# Patient Record
Sex: FEMALE | Race: WHITE | Employment: OTHER | URBAN - METROPOLITAN AREA
[De-identification: names, ages, dates, MRNs, and addresses within clinical notes are randomized per-mention and may not be internally consistent; named-entity substitution may affect disease eponyms.]

---

## 2017-03-08 ENCOUNTER — DOCUMENTATION ONLY (OUTPATIENT)
Dept: HEMATOLOGY | Age: 57
End: 2017-03-08

## 2017-03-08 NOTE — PROGRESS NOTES
Patient is >1.5 years from end of successful treatment for HCV. Returned phone call to patient who was questioning need for additional follow-up in this office. Reviewed most recent labs from 11/2016 which continue to show normal liver enzymes. No need for ongoing follow-up in this office at this time as she has previously been shown to have achieved a sustained viral response to therapy. Patient advised to maintain follow-up with her PCP and we would be happy to see her in the future if indicated.

## 2017-06-08 ENCOUNTER — OFFICE VISIT (OUTPATIENT)
Dept: INTERNAL MEDICINE CLINIC | Age: 57
End: 2017-06-08

## 2017-06-08 VITALS
SYSTOLIC BLOOD PRESSURE: 140 MMHG | OXYGEN SATURATION: 97 % | WEIGHT: 191 LBS | HEIGHT: 66 IN | RESPIRATION RATE: 18 BRPM | DIASTOLIC BLOOD PRESSURE: 71 MMHG | HEART RATE: 67 BPM | BODY MASS INDEX: 30.7 KG/M2 | TEMPERATURE: 98.2 F

## 2017-06-08 DIAGNOSIS — Z86.19 HISTORY OF HEPATITIS C: ICD-10-CM

## 2017-06-08 DIAGNOSIS — M17.0 PRIMARY OSTEOARTHRITIS OF BOTH KNEES: Primary | ICD-10-CM

## 2017-06-08 DIAGNOSIS — K64.4 ANAL SKIN TAG: ICD-10-CM

## 2017-06-08 DIAGNOSIS — C50.412 BREAST CANCER OF UPPER-OUTER QUADRANT OF LEFT FEMALE BREAST (HCC): ICD-10-CM

## 2017-06-08 DIAGNOSIS — G89.29 CHRONIC BACK PAIN, UNSPECIFIED BACK LOCATION, UNSPECIFIED BACK PAIN LATERALITY: ICD-10-CM

## 2017-06-08 DIAGNOSIS — Z76.89 ENCOUNTER TO ESTABLISH CARE: ICD-10-CM

## 2017-06-08 DIAGNOSIS — G47.00 INSOMNIA, UNSPECIFIED TYPE: ICD-10-CM

## 2017-06-08 DIAGNOSIS — M54.9 CHRONIC BACK PAIN, UNSPECIFIED BACK LOCATION, UNSPECIFIED BACK PAIN LATERALITY: ICD-10-CM

## 2017-06-08 RX ORDER — RANITIDINE 150 MG/1
300 TABLET, FILM COATED ORAL 2 TIMES DAILY
COMMUNITY
Start: 2017-05-15 | End: 2020-07-16 | Stop reason: ALTCHOICE

## 2017-06-08 RX ORDER — LORAZEPAM 2 MG/1
2 TABLET ORAL
Status: CANCELLED | OUTPATIENT
Start: 2017-06-08

## 2017-06-08 RX ORDER — HYDROCODONE BITARTRATE AND ACETAMINOPHEN 7.5; 325 MG/1; MG/1
1 TABLET ORAL
Qty: 30 TAB | Refills: 0 | Status: CANCELLED | OUTPATIENT
Start: 2017-06-08

## 2017-06-08 RX ORDER — LEVOCETIRIZINE DIHYDROCHLORIDE 5 MG/1
5 TABLET, FILM COATED ORAL DAILY
Qty: 30 TAB | Refills: 11 | Status: SHIPPED | OUTPATIENT
Start: 2017-06-08

## 2017-06-08 RX ORDER — METHOCARBAMOL 500 MG/1
500 TABLET, FILM COATED ORAL
Qty: 60 TAB | Refills: 2 | Status: SHIPPED | OUTPATIENT
Start: 2017-06-08 | End: 2018-03-20

## 2017-06-08 RX ORDER — MELOXICAM 7.5 MG/1
7.5 TABLET ORAL DAILY
Qty: 30 TAB | Refills: 11 | Status: SHIPPED | OUTPATIENT
Start: 2017-06-08 | End: 2018-12-05 | Stop reason: SDUPTHER

## 2017-06-08 RX ORDER — CYCLOBENZAPRINE HCL 10 MG
TABLET ORAL
COMMUNITY
End: 2017-06-08 | Stop reason: ALTCHOICE

## 2017-06-08 RX ORDER — TRAZODONE HYDROCHLORIDE 50 MG/1
TABLET ORAL
Qty: 60 TAB | Refills: 11 | Status: SHIPPED | OUTPATIENT
Start: 2017-06-08 | End: 2017-09-18 | Stop reason: SINTOL

## 2017-06-08 RX ORDER — MELATONIN
DAILY
COMMUNITY
End: 2020-07-16 | Stop reason: DRUGHIGH

## 2017-06-08 RX ORDER — DICLOFENAC SODIUM 75 MG/1
75 TABLET, DELAYED RELEASE ORAL 2 TIMES DAILY
Qty: 60 TAB | Refills: 3 | Status: CANCELLED | OUTPATIENT
Start: 2017-06-08

## 2017-06-08 RX ORDER — DICLOFENAC SODIUM 10 MG/G
GEL TOPICAL
Refills: 2 | COMMUNITY
Start: 2017-05-04 | End: 2017-06-08 | Stop reason: SDUPTHER

## 2017-06-08 RX ORDER — DICLOFENAC SODIUM 10 MG/G
4 GEL TOPICAL EVERY 6 HOURS
Qty: 100 G | Refills: 11 | Status: SHIPPED | OUTPATIENT
Start: 2017-06-08 | End: 2018-10-03 | Stop reason: SDUPTHER

## 2017-06-08 RX ORDER — SULFAMETHOXAZOLE AND TRIMETHOPRIM 800; 160 MG/1; MG/1
TABLET ORAL
COMMUNITY
Start: 2017-05-14 | End: 2017-06-08 | Stop reason: ALTCHOICE

## 2017-06-08 NOTE — PROGRESS NOTES
Pt is here for   Chief Complaint   Patient presents with    New Patient     pt is here to establish care     Pt states pain level is a 7 back  Pt states last had something for pain this morning    1. Have you been to the ER, urgent care clinic since your last visit? Hospitalized since your last visit? No    2. Have you seen or consulted any other health care providers outside of the [facility] since your last visit? Include any pap smears or colon screening.  No

## 2017-06-08 NOTE — PATIENT INSTRUCTIONS
Developing a Pain Management Plan: Care Instructions  Your Care Instructions  Some diseases and injuries can cause pain that lasts a long time. You don't need to live with uncontrolled pain. A pain management plan helps you find ways to control pain with side effects you can live with. There are things you can do to help with pain. Only you know how much pain you feel. Constant pain can make you depressed. It can cause stress and make it hard for you to eat and sleep. But there are ways to control the pain. They can help you stay active, improve your mood, and heal faster. Your plan can include many types of pain control. You may take prescription or over-the-counter drugs. You can also try physical treatments and behavioral methods. Some medical treatments also help with pain. For example, radiation can be used to reduce pain from bone cancer. You and your doctor will work to make your plan. Be sure to read it often. Change it if your pain is not under control. Follow-up care is a key part of your treatment and safety. Be sure to make and go to all appointments, and call your doctor if you are having problems. It's also a good idea to know your test results and keep a list of the medicines you take. How can you care for yourself at home? Physical treatments  · Be safe with medicines. Take pain medicines exactly as directed. ¨ If the doctor gave you a prescription medicine for pain, take it exactly as prescribed. ¨ If you are not taking a prescription pain medicine, ask your doctor if you can take an over-the-counter medicine. · If your pain medicine causes side effects such as constipation or nausea, you may need to take other medicines for those problems. Talk to your doctor about any side effects you have. · Hot or cold compresses applied directly to the skin can help sore muscles. Put ice or a cold pack on the painful area for 10 to 20 minutes at a time.  Put a thin cloth between the ice and your skin. You may find that it helps to switch between cold and heat. Try a heating pad set on low or a warm cloth on the area for 10 to 15 minutes at a time. · Hydrotherapy uses flowing water to relax muscles. You may want to sit in a hot tub or a steam bath. Or use a shower or a sitz bath to help your pain. · Massage therapy is rubbing the soft tissues of the body. It eases tension and pain. It also improves blood flow and helps you relax. · Transcutaneous electrical nerve stimulation (TENS) uses a gentle electric current applied to the skin for pain relief. You can use TENS at home after you learn how. · Acupuncture is a form of traditional [facility] medicine. It uses very thin needles inserted into certain points of the body. It is done by a person with special training (acupuncturist). · If you get physical therapy, make sure to do any home exercises or stretching your therapist has prescribed. · Stay as active as you can. Try to get some physical activity every day. Behavioral treatments  · Biofeedback teaches you to control a body function that you normally don't think about. These are things like skin temperature, heart rate, and blood pressure. At first, you will use a machine to give you feedback on the function you want to control. Then a therapist will teach you what to do next. Over time, you can stop using the machine. · Breathing techniques can help you relax and get rid of tension. · Guided imagery is a series of thoughts and images that can focus your attention away from your pain. When you do it, you use all your senses to help your body respond as though what you are imagining is real.  · Hypnosis is a state of focused concentration that makes you less aware of your surroundings. It may cause your brain to release chemicals that relieve pain. You can have a therapist help you through hypnosis. Or you can learn to use it on yourself. · Cognitive behavioral therapy is a type of counseling.  It helps you change your thought patterns. Changes in your thoughts can help change your behavior and the way you perceive your body. Other treatments and ideas  · Aromatherapy uses the scent of oils obtained from plants to help you relax or to relieve stress. · Meditation focuses your attention to give a clear awareness of your life. You sit quietly, focus on one image or sound, and breathe deeply. · Yoga uses stretching and exercises (called postures) to reduce stress and improve flexibility and health. · Keep track of your pain in a pain diary. This can help you understand how the things you do affect your pain. · In rare cases, your doctor may advise you to get a nerve block to help with pain. A nerve block is a shot of medicine to interrupt pain signals to your brain. · Some hospitals have special pain clinics or centers. Your doctor may refer you to a pain clinic or center. Or you can ask your doctor about them. Where can you learn more? Go to http://amos-chong.info/. Enter R465 in the search box to learn more about \"Developing a Pain Management Plan: Care Instructions. \"  Current as of: October 14, 2016  Content Version: 11.2  © 8960-8290 [employer]. Care instructions adapted under license by [employer] (which disclaims liability or warranty for this information). If you have questions about a medical condition or this instruction, always ask your healthcare professional. [address] any warranty or liability for your use of this information. WEIGHT LOSS: Daily in the morning one half hour before breakfast on an empty stomach, and at night before sleeping, drink honey and cinnamon powder boiled in one cup of water to avoid fat to accumulate in the body. How To make cinnamon and honey recipe: All you need is cinnamon, honey and water! You need 2 tablespoons of honey and 1 tablespoon of cinnamon for a cup of water.  Its so simple! Bring everything to boil and simmer until it becomes smooth. Let it cool down or drink it warm. Enjoy!       -OR-    Drink these smoothies as meal replacements for 2-3 meals over the next 10 days, with healthy snacking between meals. Try to avoid eating less than 3 hours before bedtime and get moving, start by walking if you are not currently active aiming for 10,000 steps a day (use an saira or pedometer to track) or 150 min. Per week of moderate activity if you are already active. Healthy snacks:  Fruit- 1/2 cup per serving  Vegetables-1 cup per serving  Sugar-Free or Low-Carb branded snacks  Lean protein- chicken, turkey, fish, deer, organic-fat free beef (in moderation)  Jerky-beef, chicken, or turkey  Oatmeal    Drink mostly water, aiming for 1 gallon a day, but at least 10 glasses/day. May add lemon, lime, cucumber, or citrus fruit to water to help with digestion. For each smoothie, you mix the GREENS and WATER First, then add the fruit. Flaxseeds are last and use a high-powered , preferably similar to [employer] or [facility] for adequate mixing of ingredients.     Day 1: Glenys Tonya  3 handfuls of spinach  2 c water  1 apple (cut and cored)  1 c frozen mangos  1 c frozen strawberries  1 handful frozen/fresh seedless grapes  1 stevia packet  2 TBSP flaxseeds    Day 2: Apple Strawberry  3 handfuls spring mix greens  2 c water  1 banana, peeled  2 apples (cut and cored)  1.5 c frozen strawberries  2 stevia packets  2 TBSP flaxseeds    Day 3: Apple Ramirez  1 handful spring mix, 2 handfuls spinach  2 c water  1.5 frozen blueberries  1 banana, peeled  1 apple (cut and cored)  1 packet stevia  2 TBSP flaxseeds    Day 4: Ramirez Peachy  2 handfuls Kale, 1 handful spinach  2 c water  2 apples (cut and cored)  1.5 c frozen peaches  1.5 c frozen mixed berries  2 stevia packets  2 TBSP flaxseeds    Day 5: Peach Ramirez Spinach  3 handfuls spinach  2 c water  1 c frozen peaches  1 handful seedless grapes  1.5 c blueberries  3 stevia packets  2 TBSP flaxseeds    Day 6: Pineapple Spinach  2 c spinach  2 cups water  1 c pineapple chunks  2 c frozen peaches  2 bananas, peeled  1.5 stevia packets  2 TBSP Flaxseeds    Day 7: Pineapple Berry  2 handfuls spring mix, 2 handfuls spinach  2 c water  1 banana, peeled  1.5 c pineapple chunks  1.5 c frozen seth  1 c frozen mixed berries  3 stevia packets  2 TBSP Flaxseeds    Day 8: Spinach kale Berry  2 handfuls kale, 2 handfuls spinach  2 c water  1 apple (cut and cored)  1 banana, peeled  1.5 c frozen blueberries  2 stevia packets  2 TBSP Flaxseeds    Day 9: Apple Seth  3 handfuls spinach  2 c water  1 apple (cut and cored)  1.5 c frozen seth  2 c frozen strawberries  1 packet stevia  2 TBSP Flaxseed    Day 10: Pineapple Kale  2 handfuls kale, 1 handful spring mix  2 c water  1.5 c frozen peaches  2 handfuls pineapple chunks  2 stevia packets  2 TBSP Flaxseeds

## 2017-06-08 NOTE — PROGRESS NOTES
Arsen Beltran is a 61 y.o. female and presents with New Patient (pt is here to establish care)    Subjective:  Pt here to establish care. Has breast cancer, followed by oncology. Has appt coming up in 2 weeks. Took chemo in past, but had side effects, so stopped. Also with chronic knee pain, get injections at [facility] with Dr. Ricki Henley. Last done in October, using diclofenac gel with great relief. Having rectal bleeding due to anal skin tag. Had hemorrhoidectomy years ago and treating similarly, but will have surgery on Thursday. Continues to have spotting at this time. No rectal pain. Last colonoscopy in 2015, normal.     Insomnia Review:  Pt presents for insomnia, reporting difficulty falling and staying asleep. Started months ago, unchanged. Aggravated by nothing. Alleviated by Rx meds: ativan. Tried melatonin in past without relief. Related symptoms include fatigue, difficulty concentrating, daytime drowsiness. Denies h/o PTSD, substance abuse, and alcoholism.     Review of Systems  Constitutional: negative for fevers, chills, anorexia and weight loss  Eyes:   negative for visual disturbance, drainage, and irritation  ENT:   negative for tinnitus,sore throat,nasal congestion,ear pain,and hoarseness  Respiratory:  negative for cough, hemoptysis, dyspnea, and wheezing  CV:   negative for chest pain, palpitations, and lower extremity edema  GI:   negative for nausea, vomiting, diarrhea, abdominal pain, and melena  Endo:               negative for polyuria,polydipsia,polyphagia, and heat intolerance  Genitourinary: negative for frequency, urgency, dysuria, retention, and hematuria  Integument:  negative for rash, ulcerations, and pruritus  Hematologic:  negative for easy bruising and bleeding  Musculoskel: negative for arthralgias, muscle weakness,and joint pain/swelling  Neurological:  negative for headaches, dizziness, vertigo,and memory/gait problems  Behavl/Psych: negative for feelings of anxiety, depression, suicide, and mood changes    Past Medical History:   Diagnosis Date    Arthritis     Cancer ([facility] [address]     skin - leg    Chronic pain     GERD (gastroesophageal reflux disease)     Liver disease     hep-c     Past Surgical History:   Procedure Laterality Date    BIOPSY LIVER      BREAST SURGERY PROCEDURE UNLISTED  lymph node removed 4/8/15    HX APPENDECTOMY  12/30/2014    Laparoscopic Appendectomy    HX BLADDER SUSPENSION      HX BREAST LUMPECTOMY Left 4/8/15    with radiation     HX CYST INCISION AND DRAINAGE Left 4/8/2015    INCISION AND DRAINAGE BREAST performed by Madelon Schirmer[facility] at [facility] MAIN OR    HX GYN      hysterectomy    HX ORTHOPAEDIC      shoulder and heel spurs    HX OTHER SURGICAL      Teeth implants     Social History     Social History    Marital status:      Spouse name: N/A    Number of children: N/A    Years of education: N/A     Social History Main Topics    Smoking status: Former Smoker     Packs/day: 1.00     Years: 40.00     Types: Cigarettes     Quit date: 4/15/2015    Smokeless tobacco: Never Used      Comment: using vapor e-cigaretts    Alcohol use Yes      Comment: occasional    Drug use: No    Sexual activity: Not Asked     Other Topics Concern    None     Social History Narrative     Family History   Problem Relation Age of Onset    Diabetes Mother     Heart Disease Father     Hypertension Father     Diabetes Father      Current Outpatient Prescriptions   Medication Sig Dispense Refill    raNITIdine (ZANTAC) 150 mg tablet       MV,CALCIUM,MIN/IRON/FOLIC/VITK (MULTI FOR HER PO) Take  by mouth.  cholecalciferol (VITAMIN D3) 1,000 unit tablet Take  by mouth daily.  diclofenac (VOLTAREN) 1 % gel Apply 4 g to affected area every six (6) hours. 100 g 11    levocetirizine (XYZAL) 5 mg tablet Take 1 Tab by mouth daily. 30 Tab 11    methocarbamol (ROBAXIN) 500 mg tablet Take 1 Tab by mouth four (4) times daily as needed for Pain (m. spasms).  Do NOT Drive, may cause drowsiness 60 Tab 2    meloxicam (MOBIC) 7.5 mg tablet Take 1 Tab by mouth daily. 30 Tab 11    traZODone (DESYREL) 50 mg tablet Take 1-2 tabs every night for sleep. 60 Tab 11    ondansetron (ZOFRAN ODT) 4 mg disintegrating tablet Take 1 Tab by mouth every eight (8) hours as needed for Nausea. 10 Tab 0    ondansetron (ZOFRAN ODT) 4 mg disintegrating tablet Take 1 Tab by mouth every eight (8) hours as needed for Nausea. 20 Tab 0    magnesium oxide (MAG-OX) 400 mg tablet Take 400 mg by mouth daily.  docusate sodium (COLACE) 100 mg capsule Take 1 Cap by mouth two (2) times a day. May use over-the counter equivalent instead. Take twice daily while on narcotic pain reliever to prevent constipation. 60 Cap 0    GLUCOSAMINE HCL/CHONDR PORTILLO A NA (OSTEO BI-FLEX PO) Take  by mouth.  HYDROcodone-acetaminophen (NORCO) 7.5-325 mg per tablet Take 1 tablet by mouth every six (6) hours as needed. 30 tablet 0    LORazepam (ATIVAN) 2 mg tablet Take 2 mg by mouth nightly.  fluticasone 100 mcg/actuation DsDv Take 50 mcg by inhalation.  fiber cap Take 2 Caps by mouth daily.  Omega-3-DHA-EPA-Fish Oil 1,000 (120-180) mg cap Take 1 Cap by mouth daily. Allergies   Allergen Reactions    Pcn [Penicillins] Anaphylaxis       Objective:  Visit Vitals    /71 (BP 1 Location: Left arm, BP Patient Position: Sitting)    Pulse 67    Temp 98.2 °F (36.8 °C) (Oral)    Resp 18    Ht 5' 6\" (1.676 m)    Wt 191 lb (86.6 kg)    SpO2 97%    BMI 30.83 kg/m2     Wt Readings from Last 3 Encounters:   06/08/17 191 lb (86.6 kg)   11/14/16 191 lb 2.2 oz (86.7 kg)   02/18/16 198 lb (89.8 kg)     Physical Exam:   General appearance - alert, well appearing, and in no distress. Mental status - A/O x 4, normal mood and affect. Neck -Supple ,normal CSP. FROM, non-tender. No significant adenopathy/thyromegaly. No JVD. Chest - CTA. Symmetric chest rise. No wheezing, rales or rhonchi.     Heart - Normal rate, regular rhythm. Normal S1, S2. No MGR or clicks. Abdomen - Soft,non-distended. Normoactive BS in all quadrants. NT, no mass or HSM. Ext- Radial, DP pulses, 2+ bilaterally. No pedal edema, clubbing, or cyanosis. Skin-Warm and dry. No hyperpigmentation, ulcerations, or suspicious lesions. Neuro - Normal speech, no focal findings or movement disorder. Normal strength, gait, and muscle tone. Results for orders placed or performed during the hospital encounter of 11/14/16   URINALYSIS W/ REFLEX CULTURE   Result Value Ref Range    Color YELLOW/STRAW      Appearance CLOUDY (A) CLEAR      Specific gravity 1.023 1.003 - 1.030      pH (UA) 6.0 5.0 - 8.0      Protein 30 (A) NEG mg/dL    Glucose NEGATIVE  NEG mg/dL    Ketone NEGATIVE  NEG mg/dL    Bilirubin NEGATIVE  NEG      Blood NEGATIVE  NEG      Urobilinogen 0.2 0.2 - 1.0 EU/dL    Nitrites NEGATIVE  NEG      Leukocyte Esterase NEGATIVE  NEG      WBC 0-4 0 - 4 /hpf    RBC 0-5 0 - 5 /hpf    Epithelial cells FEW FEW /lpf    Bacteria NEGATIVE  NEG /hpf    UA:UC IF INDICATED CULTURE NOT INDICATED BY UA RESULT CNI      Mucus 1+ (A) NEG /lpf   CBC WITH AUTOMATED DIFF   Result Value Ref Range    WBC 13.2 (H) 3.6 - 11.0 K/uL    RBC 4.97 3.80 - 5.20 M/uL    HGB 14.8 11.5 - 16.0 g/dL    HCT 45.3 35.0 - 47.0 %    MCV 91.1 80.0 - 99.0 FL    MCH 29.8 26.0 - 34.0 PG    MCHC 32.7 30.0 - 36.5 g/dL    RDW 13.6 11.5 - 14.5 %    PLATELET 589 185 - 859 K/uL    NEUTROPHILS 90 (H) 32 - 75 %    LYMPHOCYTES 6 (L) 12 - 49 %    MONOCYTES 4 (L) 5 - 13 %    EOSINOPHILS 0 0 - 7 %    BASOPHILS 0 0 - 1 %    ABS. NEUTROPHILS 11.9 (H) 1.8 - 8.0 K/UL    ABS. LYMPHOCYTES 0.8 0.8 - 3.5 K/UL    ABS. MONOCYTES 0.5 0.0 - 1.0 K/UL    ABS. EOSINOPHILS 0.0 0.0 - 0.4 K/UL    ABS.  BASOPHILS 0.0 0.0 - 0.1 K/UL   METABOLIC PANEL, COMPREHENSIVE   Result Value Ref Range    Sodium 140 136 - 145 mmol/L    Potassium 3.8 3.5 - 5.1 mmol/L    Chloride 106 97 - 108 mmol/L    CO2 26 21 - 32 mmol/L    Anion gap 8 5 - 15 mmol/L    Glucose 107 (H) 65 - 100 mg/dL    BUN 23 (H) 6 - 20 MG/DL    Creatinine 0.93 0.55 - 1.02 MG/DL    BUN/Creatinine ratio 25 (H) 12 - 20      GFR est AA >60 >60 ml/min/1.73m2    GFR est non-AA >60 >60 ml/min/1.73m2    Calcium 9.4 8.5 - 10.1 MG/DL    Bilirubin, total 0.4 0.2 - 1.0 MG/DL    ALT (SGPT) 33 12 - 78 U/L    AST (SGOT) 25 15 - 37 U/L    Alk. phosphatase 74 45 - 117 U/L    Protein, total 8.5 (H) 6.4 - 8.2 g/dL    Albumin 4.0 3.5 - 5.0 g/dL    Globulin 4.5 (H) 2.0 - 4.0 g/dL    A-G Ratio 0.9 (L) 1.1 - 2.2     MAGNESIUM   Result Value Ref Range    Magnesium 2.1 1.6 - 2.4 mg/dL   LIPASE   Result Value Ref Range    Lipase 120 73 - 393 U/L     Assessment/Plan:  Trazodone started. Deferred pain mgt to pain clinic. meloxicam and robaxin added also. I spent greater than 50% of 30 minute visit counseling patient about impressions, prognosis, risk/benefits of treatment options, medication management and adequate follow-up, importance of adherence to treatment plan, and risk factor reduction. Medication Side Effects and Warnings were discussed with patient: yes   Patient Labs were reviewed: yes  Patient Past Records were reviewed: yes    See below for other orders   Follow-up Disposition:  Return in about 3 months (around 9/8/2017) for PCV, FOBT, 3 month f/u. Pt has given consent verbally while in office for [employer]. ICD-10-CM ICD-9-CM    1. Primary osteoarthritis of both knees M17.0 715.16 REFERRAL TO PAIN CLINIC   2. Anal skin tag K64.4 455.9    3. History of hepatitis C Z86.19 V12.09    4. Breast cancer of upper-outer quadrant of left female breast (HCC) C50.412 174.4    5. Chronic back pain, unspecified back location, unspecified back pain laterality M54.9 724.5 REFERRAL TO PAIN CLINIC    G89.29 338.29    6. Encounter to establish care Z76.89 V65.8    7.  Insomnia, unspecified type G47.00 780.52      Orders Placed This Encounter    REFERRAL TO PAIN CLINIC     Referral Priority: Routine     Referral Type:   Consultation     Referral Reason:   Specialty Services Required     Referral Location:   [address] [place] [address] Cricket: diclofenac (VOLTAREN) 1 % gel     Sig: APPLY TO AFFECTED AREA TWICE DAILY AS NEEDED     Refill:  2    raNITIdine (ZANTAC) 150 mg tablet    DISCONTD: trimethoprim-sulfamethoxazole (BACTRIM DS, SEPTRA DS) 160-800 mg per tablet    DISCONTD: cyclobenzaprine (FLEXERIL) 10 mg tablet     Sig: Take  by mouth three (3) times daily as needed for Muscle Spasm(s).  MV,CALCIUM,MIN/IRON/FOLIC/VITK (MULTI FOR HER PO)     Sig: Take  by mouth.  cholecalciferol (VITAMIN D3) 1,000 unit tablet     Sig: Take  by mouth daily.  diclofenac (VOLTAREN) 1 % gel     Sig: Apply 4 g to affected area every six (6) hours. Dispense:  100 g     Refill:  11    levocetirizine (XYZAL) 5 mg tablet     Sig: Take 1 Tab by mouth daily. Dispense:  30 Tab     Refill:  11    methocarbamol (ROBAXIN) 500 mg tablet     Sig: Take 1 Tab by mouth four (4) times daily as needed for Pain (m. spasms). Do NOT Drive, may cause drowsiness     Dispense:  60 Tab     Refill:  2    meloxicam (MOBIC) 7.5 mg tablet     Sig: Take 1 Tab by mouth daily. Dispense:  30 Tab     Refill:  11    traZODone (DESYREL) 50 mg tablet     Sig: Take 1-2 tabs every night for sleep. Dispense:  60 Tab     Refill:  11       Saray Fried expressed understanding of plan. An After Visit Summary was offered/printed and given to the patient.

## 2017-06-08 NOTE — MR AVS SNAPSHOT
Visit Information Date & Time Provider Department Dept. Phone Encounter #  
 6/8/2017 11:00 AM Margaret Davis NP 0969 [facility] [phone] [id number] Follow-up Instructions Return in about 3 months (around 9/8/2017) for PCV, FOBT, 3 month f/u. Upcoming Health Maintenance Date Due Pneumococcal 19-64 Highest Risk (1 of 3 - PCV13) 11/24/1978 DTaP/Tdap/Td series (1 - Tdap) 11/24/1980 FOBT Q 1 YEAR AGE 50-75 1/4/2011 PAP AKA CERVICAL CYTOLOGY 1/2/2016 INFLUENZA AGE 9 TO ADULT 8/1/2017 BREAST CANCER SCRN MAMMOGRAM 12/6/2018 Allergies as of 6/8/2017  Review Complete On: 6/8/2017 By: Margaret Davis NP Severity Noted Reaction Type Reactions Pcn [Penicillins] High 04/08/2013    Anaphylaxis Current Immunizations  Never Reviewed Name Date Influenza Vaccine 11/1/2014 Not reviewed this visit You Were Diagnosed With   
  
 Codes Comments Primary osteoarthritis of both knees    -  Primary ICD-10-CM: M17.0 ICD-9-CM: 715.16 Anal skin tag     ICD-10-CM: K64.4 ICD-9-CM: 455.9 History of hepatitis C     ICD-10-CM: Z86.19 ICD-9-CM: V12.09 Breast cancer of upper-outer quadrant of left female breast ([facility] [place] [facility]     ICD-10-CM: X79.438 ICD-9-CM: 174.4 Chronic back pain, unspecified back location, unspecified back pain laterality     ICD-10-CM: M54.9, G89.29 ICD-9-CM: 724.5, 338.29 Vitals BP Pulse Temp Resp Height(growth percentile) Weight(growth percentile) 140/71 (BP 1 Location: Left arm, BP Patient Position: Sitting) 67 98.2 °F (36.8 °C) (Oral) 18 5' 6\" (1.676 m) 191 lb (86.6 kg) SpO2 BMI OB Status Smoking Status 97% 30.83 kg/m2 Postmenopausal Former Smoker Vitals History BMI and BSA Data Body Mass Index Body Surface Area  
 30.83 kg/m 2 2.01 m 2 Preferred Pharmacy Pharmacy Name Phone [facility] [address] [phone] Your Updated Medication List  
  
   
This list is accurate as of: 6/8/17 12:00 PM.  Always use your most recent med list.  
  
  
  
  
 diclofenac 1 % Gel Commonly known as:  VOLTAREN Apply 4 g to affected area every six (6) hours. docusate sodium 100 mg capsule Commonly known as:  Young Bucker Take 1 Cap by mouth two (2) times a day. May use over-the counter equivalent instead. Take twice daily while on narcotic pain reliever to prevent constipation. fiber Cap Take 2 Caps by mouth daily. fluticasone 100 mcg/actuation Dsdv Take 50 mcg by inhalation. HYDROcodone-acetaminophen 7.5-325 mg per tablet Commonly known as:  Joyice Breeze Take 1 tablet by mouth every six (6) hours as needed. levocetirizine 5 mg tablet Commonly known as:  Callum Drones Take 1 Tab by mouth daily. LORazepam 2 mg tablet Commonly known as:  ATIVAN Take 2 mg by mouth nightly.  
  
 magnesium oxide 400 mg tablet Commonly known as:  MAG-OX Take 400 mg by mouth daily. meloxicam 7.5 mg tablet Commonly known as:  MOBIC Take 1 Tab by mouth daily. methocarbamol 500 mg tablet Commonly known as:  ROBAXIN Take 1 Tab by mouth four (4) times daily as needed for Pain (m. spasms). Do NOT Drive, may cause drowsiness MULTI FOR HER PO Take  by mouth. Omega-3-DHA-EPA-Fish Oil 1,000 mg (120 mg-180 mg) Cap Take 1 Cap by mouth daily. * ondansetron 4 mg disintegrating tablet Commonly known as:  ZOFRAN ODT Take 1 Tab by mouth every eight (8) hours as needed for Nausea. * ondansetron 4 mg disintegrating tablet Commonly known as:  ZOFRAN ODT Take 1 Tab by mouth every eight (8) hours as needed for Nausea. OSTEO BI-FLEX PO Take  by mouth. raNITIdine 150 mg tablet Commonly known as:  ZANTAC  
  
 VITAMIN D3 1,000 unit tablet Generic drug:  cholecalciferol Take  by mouth daily. * Notice:   This list has 2 medication(s) that are the same as other medications prescribed for you. Read the directions carefully, and ask your doctor or other care provider to review them with you. Prescriptions Sent to Pharmacy Refills  
 diclofenac (VOLTAREN) 1 % gel 11 Sig: Apply 4 g to affected area every six (6) hours. Class: Normal  
 Pharmacy: [address] [place] [address] Ph #: [phone] Route: Topical  
 levocetirizine (XYZAL) 5 mg tablet 11 Sig: Take 1 Tab by mouth daily. Class: Normal  
 Pharmacy: [address] [place] [address] Ph #: [phone] Route: Oral  
 methocarbamol (ROBAXIN) 500 mg tablet 2 Sig: Take 1 Tab by mouth four (4) times daily as needed for Pain (m. spasms). Do NOT Drive, may cause drowsiness Class: Normal  
 Pharmacy: [address] [place] [address] Ph #: [phone] Route: Oral  
 meloxicam (MOBIC) 7.5 mg tablet 11 Sig: Take 1 Tab by mouth daily. Class: Normal  
 Pharmacy: [address] [place] [address] Ph #: [phone] Route: Oral  
  
We Performed the Following REFERRAL TO PAIN CLINIC [ARC39 Custom] Comments:  
 Please evaluate patient for chronic knee and lower back pain, med management. Please CALL 
[phone] [address] [place] [address] 32 130 [facility] [address] [place] [address] Follow-up Instructions Return in about 3 months (around 9/8/2017) for PCV, FOBT, 3 month f/u. To-Do List   
 06/20/2017 9:00 AM  
  Appointment with [facility] ANGELA 1 at [address] [place] [address] ([phone]) Shower or bathe using soap and water.   Do not use deodorant, powder, perfumes, or lotion the day of your exam.  If your prior mammograms were not performed at [facility] please bring films with you or forward prior images 2 days before your procedure. If patient is not a callback diagnostic, the patient must have an order/script from the physician for the diagnostic. Please bring it on the day of the mammogram or have it faxed to the department. [facility]  [phone] [facility] [address] EVELIO  [phone] [phone] [facility] [address] [facility] [phone] Jm Alvarez [phone] Referral Information Referral ID Referred By Referred To  
  
 [id number] 1950 [facility], [address] [facility],[address] [facility], [place]Don Schmidt Final Phone: [phone] Fax: [phone] Visits Status Start Date End Date 1 New Request 6/8/17 6/8/18 If your referral has a status of pending review or denied, additional information will be sent to support the outcome of this decision. Patient Instructions Developing a Pain Management Plan: Care Instructions Your Care Instructions Some diseases and injuries can cause pain that lasts a long time. You don't need to live with uncontrolled pain. A pain management plan helps you find ways to control pain with side effects you can live with. There are things you can do to help with pain. Only you know how much pain you feel. Constant pain can make you depressed. It can cause stress and make it hard for you to eat and sleep. But there are ways to control the pain. They can help you stay active, improve your mood, and heal faster. Your plan can include many types of pain control. You may take prescription or over-the-counter drugs. You can also try physical treatments and behavioral methods. Some medical treatments also help with pain. For example, radiation can be used to reduce pain from bone cancer. You and your doctor will work to make your plan. Be sure to read it often. Change it if your pain is not under control. Follow-up care is a key part of your treatment and safety.  Be sure to make and go to all appointments, and call your doctor if you are having problems. It's also a good idea to know your test results and keep a list of the medicines you take. How can you care for yourself at home? Physical treatments · Be safe with medicines. Take pain medicines exactly as directed. ¨ If the doctor gave you a prescription medicine for pain, take it exactly as prescribed. ¨ If you are not taking a prescription pain medicine, ask your doctor if you can take an over-the-counter medicine. · If your pain medicine causes side effects such as constipation or nausea, you may need to take other medicines for those problems. Talk to your doctor about any side effects you have. · Hot or cold compresses applied directly to the skin can help sore muscles. Put ice or a cold pack on the painful area for 10 to 20 minutes at a time. Put a thin cloth between the ice and your skin. You may find that it helps to switch between cold and heat. Try a heating pad set on low or a warm cloth on the area for 10 to 15 minutes at a time. · Hydrotherapy uses flowing water to relax muscles. You may want to sit in a hot tub or a steam bath. Or use a shower or a sitz bath to help your pain. · Massage therapy is rubbing the soft tissues of the body. It eases tension and pain. It also improves blood flow and helps you relax. · Transcutaneous electrical nerve stimulation (TENS) uses a gentle electric current applied to the skin for pain relief. You can use TENS at home after you learn how. · Acupuncture is a form of traditional [facility] medicine. It uses very thin needles inserted into certain points of the body. It is done by a person with special training (acupuncturist). · If you get physical therapy, make sure to do any home exercises or stretching your therapist has prescribed. · Stay as active as you can. Try to get some physical activity every day. Behavioral treatments · Biofeedback teaches you to control a body function that you normally don't think about. These are things like skin temperature, heart rate, and blood pressure. At first, you will use a machine to give you feedback on the function you want to control. Then a therapist will teach you what to do next. Over time, you can stop using the machine. · Breathing techniques can help you relax and get rid of tension. · Guided imagery is a series of thoughts and images that can focus your attention away from your pain. When you do it, you use all your senses to help your body respond as though what you are imagining is real. 
· Hypnosis is a state of focused concentration that makes you less aware of your surroundings. It may cause your brain to release chemicals that relieve pain. You can have a therapist help you through hypnosis. Or you can learn to use it on yourself. · Cognitive behavioral therapy is a type of counseling. It helps you change your thought patterns. Changes in your thoughts can help change your behavior and the way you perceive your body. Other treatments and ideas · Aromatherapy uses the scent of oils obtained from plants to help you relax or to relieve stress. · Meditation focuses your attention to give a clear awareness of your life. You sit quietly, focus on one image or sound, and breathe deeply. · Yoga uses stretching and exercises (called postures) to reduce stress and improve flexibility and health. · Keep track of your pain in a pain diary. This can help you understand how the things you do affect your pain. · In rare cases, your doctor may advise you to get a nerve block to help with pain. A nerve block is a shot of medicine to interrupt pain signals to your brain. · Some hospitals have special pain clinics or centers. Your doctor may refer you to a pain clinic or center. Or you can ask your doctor about them. Where can you learn more? Go to http://amos-chong.info/. Enter J251 in the search box to learn more about \"Developing a Pain Management Plan: Care Instructions. \" Current as of: October 14, 2016 Content Version: 11.2 © 2307-4841 [employer]. Care instructions adapted under license by [employer] (which disclaims liability or warranty for this information). If you have questions about a medical condition or this instruction, always ask your healthcare professional. [address] any warranty or liability for your use of this information. WEIGHT LOSS: Daily in the morning one half hour before breakfast on an empty stomach, and at night before sleeping, drink honey and cinnamon powder boiled in one cup of water to avoid fat to accumulate in the body. How To make cinnamon and honey recipe: All you need is cinnamon, honey and water! You need 2 tablespoons of honey and 1 tablespoon of cinnamon for a cup of water. Its so simple! Bring everything to boil and simmer until it becomes smooth. Let it cool down or drink it warm. Enjoy!  
 
 
-OR- 
 
Drink these smoothies as meal replacements for 2-3 meals over the next 10 days, with healthy snacking between meals. Try to avoid eating less than 3 hours before bedtime and get moving, start by walking if you are not currently active aiming for 10,000 steps a day (use an saira or pedometer to track) or 150 min. Per week of moderate activity if you are already active. Healthy snacks: 
Fruit- 1/2 cup per serving Vegetables-1 cup per serving Sugar-Free or Low-Carb branded snacks Lean protein- chicken, turkey, fish, deer, organic-fat free beef (in moderation) Jerky-beef, chicken, or turkey Oatmeal 
 
Drink mostly water, aiming for 1 gallon a day, but at least 10 glasses/day. May add lemon, lime, cucumber, or citrus fruit to water to help with digestion. For each smoothie, you mix the GREENS and WATER First, then add the fruit. Flaxseeds are last and use a high-powered , preferably similar to Ninja or Nutribullet for adequate mixing of ingredients. Day 1: Wilhemina Basket 3 handfuls of spinach 2 c water 1 apple (cut and cored) 1 c frozen mangos 1 c frozen strawberries 1 handful frozen/fresh seedless grapes 1 stevia packet 2 TBSP flaxseeds Day 2: Apple [place] 3 handfuls spring mix greens 2 c water 
1 banana, peeled 2 apples (cut and cored) 1.5 c frozen strawberries 2 stevia packets 2 TBSP flaxseeds Day 3: Apple Paralee Gary 1 handful spring mix, 2 handfuls spinach 2 c water 1.5 frozen blueberries 1 banana, peeled 1 apple (cut and cored) 1 packet stevia 
2 TBSP flaxseeds Day 4: Ashok Picket [address] [place], 1 handful spinach 2 c water 2 apples (cut and cored) 1.5 c frozen peaches 1.5 c frozen mixed berries 2 stevia packets 2 TBSP flaxseeds Day 5: Peach Ramirez Spinach 
3 handfuls spinach 2 c water 1 c frozen peaches 
1 handful seedless grapes 1.5 c blueberries 3 stevia packets 2 TBSP flaxseeds Day 6: Pineapple Spinach 2 c spinach 2 cups water 1 c pineapple chunks 2 c frozen peaches 
2 bananas, peeled 1.5 stevia packets 2 TBSP Flaxseeds Day 7: Pineapple Paralee Gary 2 handfuls spring mix, 2 handfuls spinach 2 c water 
1 banana, peeled 1.5 c pineapple chunks 1.5 c frozen seth 1 c frozen mixed berries 3 stevia packets 2 TBSP Flaxseeds Day 8: Spinach kale Paralee Gary 2 handfuls kale, 2 handfuls spinach 2 c water 1 apple (cut and cored) 1 banana, peeled 1.5 c frozen blueberries 2 stevia packets 2 TBSP Flaxseeds Day 9: Apple Seth 3 handfuls spinach 2 c water 1 apple (cut and cored) 1.5 c frozen seth 2 c frozen strawberries 1 packet stevia 
2 TBSP Flaxseed Day 10: Pineapple Kale 
2 handfuls kale, 1 handful spring mix 2 c water 1.5 c frozen peaches 2 handfuls pineapple chunks 2 stevia packets 2 TBSP Flaxseeds Introducing [facility]! Dear Thomas Chacon: Thank you for requesting a [employer] account. Our records indicate that you already have an active [employer] account. You can access your account anytime at [email or web address] Did you know that you can access your hospital and ER discharge instructions at any time in [employer]? You can also review all of your test results from your hospital stay or ER visit. Additional Information If you have questions, please visit the Frequently Asked Questions section of the [employer] website at [email or web address] Remember, [employer] is NOT to be used for urgent needs. For medical emergencies, dial 911. Now available from your iPhone and Android! Please provide this summary of care documentation to your next provider. Your primary care clinician is listed as DEREK Asher. If you have any questions after today's visit, please call [phone].

## 2017-06-20 ENCOUNTER — HOSPITAL ENCOUNTER (OUTPATIENT)
Dept: MAMMOGRAPHY | Age: 57
Discharge: HOME | End: 2017-06-20
Attending: FAMILY MEDICINE
Payer: COMMERCIAL

## 2017-06-20 DIAGNOSIS — R92.8 ABNORMAL MAMMOGRAM: ICD-10-CM

## 2017-06-20 PROCEDURE — 77065 DX MAMMO INCL CAD UNI: CPT

## 2017-07-18 ENCOUNTER — HOSPITAL ENCOUNTER (OUTPATIENT)
Dept: MRI IMAGING | Age: 57
Discharge: HOME | End: 2017-07-18
Attending: PAIN MEDICINE
Payer: COMMERCIAL

## 2017-07-18 DIAGNOSIS — M48.061 STENOSIS, SPINAL, LUMBAR: ICD-10-CM

## 2017-07-18 PROCEDURE — A9576 INJ PROHANCE MULTIPACK: HCPCS | Performed by: PAIN MEDICINE

## 2017-07-18 PROCEDURE — 72158 MRI LUMBAR SPINE W/O & W/DYE: CPT

## 2017-07-18 PROCEDURE — 74011250636 HC RX REV CODE- 250/636: Performed by: PAIN MEDICINE

## 2017-07-18 RX ADMIN — GADOTERIDOL 17 ML: 279.3 INJECTION, SOLUTION INTRAVENOUS at 10:31

## 2017-08-15 ENCOUNTER — HOSPITAL ENCOUNTER (OUTPATIENT)
Dept: ULTRASOUND IMAGING | Age: 57
Discharge: HOME | End: 2017-08-15
Attending: PAIN MEDICINE
Payer: COMMERCIAL

## 2017-08-15 DIAGNOSIS — Q61.3 POLYCYSTIC KIDNEY, UNSPECIFIED TYPE: ICD-10-CM

## 2017-08-15 PROCEDURE — 76770 US EXAM ABDO BACK WALL COMP: CPT

## 2017-09-18 ENCOUNTER — OFFICE VISIT (OUTPATIENT)
Dept: INTERNAL MEDICINE CLINIC | Age: 57
End: 2017-09-18

## 2017-09-18 ENCOUNTER — HOSPITAL ENCOUNTER (OUTPATIENT)
Dept: LAB | Age: 57
Discharge: HOME | End: 2017-09-18

## 2017-09-18 VITALS
WEIGHT: 192.2 LBS | RESPIRATION RATE: 18 BRPM | TEMPERATURE: 98.2 F | SYSTOLIC BLOOD PRESSURE: 122 MMHG | BODY MASS INDEX: 30.89 KG/M2 | OXYGEN SATURATION: 94 % | HEIGHT: 66 IN | DIASTOLIC BLOOD PRESSURE: 72 MMHG | HEART RATE: 64 BPM

## 2017-09-18 DIAGNOSIS — G62.9 NEUROPATHY: Primary | ICD-10-CM

## 2017-09-18 DIAGNOSIS — Z13.21 SCREENING FOR ENDOCRINE, NUTRITIONAL, METABOLIC AND IMMUNITY DISORDER: ICD-10-CM

## 2017-09-18 DIAGNOSIS — Z12.11 COLON CANCER SCREENING: ICD-10-CM

## 2017-09-18 DIAGNOSIS — Z13.228 SCREENING FOR ENDOCRINE, NUTRITIONAL, METABOLIC AND IMMUNITY DISORDER: ICD-10-CM

## 2017-09-18 DIAGNOSIS — G47.00 INSOMNIA, UNSPECIFIED TYPE: ICD-10-CM

## 2017-09-18 DIAGNOSIS — Z13.0 SCREENING FOR ENDOCRINE, NUTRITIONAL, METABOLIC AND IMMUNITY DISORDER: ICD-10-CM

## 2017-09-18 DIAGNOSIS — Z13.220 SCREENING FOR LIPID DISORDERS: ICD-10-CM

## 2017-09-18 DIAGNOSIS — J30.89 NON-SEASONAL ALLERGIC RHINITIS, UNSPECIFIED ALLERGIC RHINITIS TRIGGER: ICD-10-CM

## 2017-09-18 DIAGNOSIS — G89.29 CHRONIC BACK PAIN, UNSPECIFIED BACK LOCATION, UNSPECIFIED BACK PAIN LATERALITY: ICD-10-CM

## 2017-09-18 DIAGNOSIS — M54.9 CHRONIC BACK PAIN, UNSPECIFIED BACK LOCATION, UNSPECIFIED BACK PAIN LATERALITY: ICD-10-CM

## 2017-09-18 DIAGNOSIS — Z13.29 SCREENING FOR ENDOCRINE, NUTRITIONAL, METABOLIC AND IMMUNITY DISORDER: ICD-10-CM

## 2017-09-18 PROBLEM — Z85.3 HISTORY OF BREAST CANCER: Status: ACTIVE | Noted: 2017-09-18

## 2017-09-18 PROCEDURE — 99001 SPECIMEN HANDLING PT-LAB: CPT | Performed by: NURSE PRACTITIONER

## 2017-09-18 RX ORDER — FLUTICASONE PROPIONATE 50 MCG
2 SPRAY, SUSPENSION (ML) NASAL DAILY
Qty: 1 BOTTLE | Refills: 11 | Status: SHIPPED | OUTPATIENT
Start: 2017-09-18 | End: 2018-09-17 | Stop reason: SDUPTHER

## 2017-09-18 NOTE — MR AVS SNAPSHOT
Visit Information Date & Time Provider Department Dept. Phone Encounter #  
 9/18/2017 10:20 AM Suzy Villagran NP [address] [facility] [phone] [id number] Upcoming Health Maintenance Date Due Pneumococcal 19-64 Highest Risk (1 of 3 - PCV13) 10/18/2017* FOBT Q 1 YEAR AGE 50-75 10/18/2017* PAP AKA CERVICAL CYTOLOGY 9/18/2018 BREAST CANCER SCRN MAMMOGRAM 6/20/2019 DTaP/Tdap/Td series (2 - Td) 1/1/2024 *Topic was postponed. The date shown is not the original due date. Allergies as of 9/18/2017  Review Complete On: 9/18/2017 By: Suzy Villagran NP Severity Noted Reaction Type Reactions Pcn [Penicillins] High 04/08/2013    Anaphylaxis Current Immunizations  Never Reviewed Name Date Influenza Vaccine 11/1/2014 Not reviewed this visit You Were Diagnosed With   
  
 Codes Comments Neuropathy ([facility] 75.)    -  Primary ICD-10-CM: G62.9 ICD-9-CM: 409. 9 Chronic back pain, unspecified back location, unspecified back pain laterality     ICD-10-CM: M54.9, G89.29 ICD-9-CM: 724.5, 338.29 Screening for endocrine, nutritional, metabolic and immunity disorder     ICD-10-CM: Z13.29, Z13.21, Z13.228, Z13.0 ICD-9-CM: V77.99 Screening for lipid disorders     ICD-10-CM: Z13.220 ICD-9-CM: V77.91 Colon cancer screening     ICD-10-CM: Z12.11 ICD-9-CM: V76.51 Insomnia, unspecified type     ICD-10-CM: G47.00 ICD-9-CM: 780.52 Vitals BP Pulse Temp Resp Height(growth percentile) Weight(growth percentile) 122/72 (BP 1 Location: Left arm, BP Patient Position: Sitting) 64 98.2 °F (36.8 °C) (Oral) 18 5' 6\" (1.676 m) 192 lb 3.2 oz (87.2 kg) SpO2 BMI OB Status Smoking Status 94% 31.02 kg/m2 Postmenopausal Former Smoker Vitals History BMI and BSA Data Body Mass Index Body Surface Area 31.02 kg/m 2 2.02 m 2 Preferred Pharmacy Pharmacy Name Phone [facility] [address] [place] [address] Cecilia Bustos, [facility] [address] [place] [address] [phone] Your Updated Medication List  
  
   
This list is accurate as of: 9/18/17 11:40 AM.  Always use your most recent med list.  
  
  
  
  
 diclofenac 1 % Gel Commonly known as:  VOLTAREN Apply 4 g to affected area every six (6) hours. docusate sodium 100 mg capsule Commonly known as:  Cheri Boss Take 1 Cap by mouth two (2) times a day. May use over-the counter equivalent instead. Take twice daily while on narcotic pain reliever to prevent constipation. fiber Cap Take 2 Caps by mouth daily. HYDROcodone-acetaminophen 7.5-325 mg per tablet Commonly known as:  Rhenda Howell Take 1 tablet by mouth every six (6) hours as needed. levocetirizine 5 mg tablet Commonly known as:  Dewanda Gather Take 1 Tab by mouth daily. LORazepam 2 mg tablet Commonly known as:  ATIVAN Take 2 mg by mouth nightly.  
  
 magnesium oxide 400 mg tablet Commonly known as:  MAG-OX Take 400 mg by mouth daily. meloxicam 7.5 mg tablet Commonly known as:  MOBIC Take 1 Tab by mouth daily. methocarbamol 500 mg tablet Commonly known as:  ROBAXIN Take 1 Tab by mouth four (4) times daily as needed for Pain (m. spasms). Do NOT Drive, may cause drowsiness MULTI FOR HER PO Take  by mouth. Omega-3-DHA-EPA-Fish Oil 1,000 mg (120 mg-180 mg) Cap Take 1 Cap by mouth daily. OSTEO BI-FLEX PO Take  by mouth. raNITIdine 150 mg tablet Commonly known as:  ZANTAC  
300 mg.  
  
 suvorexant 10 mg tablet Commonly known as:  Plasencia Na Take 1 Tab by mouth nightly as needed for Insomnia. Max Daily Amount: 10 mg. Indications: INSOMNIA  
  
 VITAMIN D3 1,000 unit tablet Generic drug:  cholecalciferol Take  by mouth daily. Prescriptions Printed Refills  
 suvorexant (BELSOMRA) 10 mg tablet 5 Sig: Take 1 Tab by mouth nightly as needed for Insomnia.  Max Daily Amount: 10 mg. Indications: INSOMNIA Class: Print Route: Oral  
  
We Performed the Following CBC WITH AUTOMATED DIFF [40769 CPT(R)] HEMOGLOBIN A1C WITH EAG [81156 CPT(R)] LIPID PANEL [19461 CPT(R)] METABOLIC PANEL, COMPREHENSIVE [61325 CPT(R)] OCCULT BLOOD, IMMUNOASSAY (FIT) [id number] CPT(R)] TSH 3RD GENERATION [04958 CPT(R)] To-Do List   
 12/13/2017 10:00 AM  
  Appointment with [facility] ANGELA 3 at [address] [place] [address] ([phone]) Shower or bathe using soap and water. Do not use deodorant, powder, perfumes, or lotion the day of your exam.  If your prior mammograms were not performed at [facility] 6 please bring films with you or forward prior images 2 days before your procedure. Check in at registration 15min before your appointment time unless you were instructed to do otherwise. A script is not necessary, but if you have one, please bring it on the day of the mammogram or have it faxed to the department. [facility] [phone] [facility] [place]  [phone] [facility] [address] [place]  [phone]  [phone] [address] [phone] Patient Instructions You would benefit from more adequate rest. Please be sure to remove yourself from stimulating activities, like using your cellphone, tablet, or watching T.V. About 2 hours before bedtime. Instead engage in relaxing activities, like reading, yoga, or listening to calming music. Also try to refrain from using these devices in your sleep space or bed, as this trains your brain to stay awake in the bed versus sleeping. Avoid caffeine containing products like coffee, tea, chocolate, and soda within 2 hours of bedtime and try over the counter meds as needed to help like ZZZquil, benadryl, UNISOM or melatonin (they are non-habit forming). Suvorexant (By mouth) Suvorexant (rgq-tea-MYU-ant) Treats insomnia. Brand Name(s): Belsomra There may be other brand names for this medicine. When This Medicine Should Not Be Used: This medicine is not right for everyone. Do not use it if you have narcolepsy. How to Use This Medicine:  
Tablet · Your doctor will tell you how much medicine to use. Do not use more than directed. · You should not take this medicine if you are not able to sleep or rest for at least 7 hours before you need to be active again. · This medicine works better if you do not take it with food or right after a meal. 
· This medicine should come with a Medication Guide. Ask your pharmacist for a copy if you do not have one. · Missed dose: This medicine is not taken on a regular schedule. Use it only when you cannot sleep. · Store the medicine in a closed container at room temperature, away from heat, moisture, and direct light. Do not remove the tablets from the blister pack until you are ready to use them. Drugs and Foods to Avoid: Ask your doctor or pharmacist before using any other medicine, including over-the-counter medicines, vitamins, and herbal products. · Some foods and medicines can affect how suvorexant works. Tell your doctor if you are using any of the following: 
¨ Aprepitant, boceprevir, carbamazepine, ciprofloxacin, clarithromycin, conivaptan, digoxin, diltiazem, erythromycin, fluconazole, imatinib, nefazodone, phenytoin, rifampin, telaprevir, telithromycin, verapamil ¨ Medicine to treat HIV (such as amprenavir, atazanavir, fosamprenavir, indinavir, nelfinavir, ritonavir, saquinavir) or medicine to treat a fungal infection (such as itraconazole, ketoconazole, posaconazole) · Tell your doctor if you use anything else that makes you sleepy. Some examples are allergy medicine, narcotic pain medicine, and alcohol. · Do not eat grapefruit or drink grapefruit juice while you are using this medicine. Warnings While Using This Medicine: · Tell your doctor if you are pregnant or breastfeeding, or if you have liver disease, breathing or lung problems (such as COPD, sleep apnea), or muscle problems or weakness. Tell your doctor if you have a history of alcohol or drug addiction, depression, or mental illness. · This medicine may cause the following problems: ¨ Unusual changes in mood or behavior ¨ Sleep paralysis (while you are going to sleep or waking up) · This medicine may make you drowsy the next morning. Do not drive or do anything else that could be dangerous until you know how this medicine affects you. · This medicine may cause you to do things while you are still asleep, such as driving or eating. You may not remember doing these things the next morning. Tell your doctor right away if you learn that this has happened. · Call your doctor if you still have trouble sleeping after you take this medicine for 7 to 10 days. · This medicine can be habit-forming. Do not use more than your prescribed dose. Call your doctor if you think your medicine is not working. · Keep all medicine out of the reach of children. Never share your medicine with anyone. Possible Side Effects While Using This Medicine:  
Call your doctor right away if you notice any of these side effects: · Allergic reaction: Itching or hives, swelling in your face or hands, swelling or tingling in your mouth or throat, chest tightness, trouble breathing · Anxiety, depression, nervousness, unusual behavior, or thoughts of hurting yourself · Memory loss · Seeing, hearing, or feeling things that are not there · Severe confusion, drowsiness, muscle weakness · Temporary inability to move or talk while you are going to sleep or waking up If you notice these less serious side effects, talk with your doctor: · Daytime drowsiness If you notice other side effects that you think are caused by this medicine, tell your doctor. Call your doctor for medical advice about side effects. You may report side effects to FDA at 9-769-WSF-5327 © 2017 [address] Information is for End User's use only and may not be sold, redistributed or otherwise used for commercial purposes. The above information is an  only. It is not intended as medical advice for individual conditions or treatments. Talk to your doctor, nurse or pharmacist before following any medical regimen to see if it is safe and effective for you. Learning About Sleeping Well What does sleeping well mean? Sleeping well means getting enough sleep. How much sleep is enough varies among people. The number of hours you sleep is not as important as how you feel when you wake up. If you do not feel refreshed, you probably need more sleep. Another sign of not getting enough sleep is feeling tired during the day. The average total nightly sleep time is 7½ to 8 hours. Healthy adults may need a little more or a little less than this. Why is getting enough sleep important? Getting enough quality sleep is a basic part of good health. When your sleep suffers, your mood and your thoughts can suffer too. You may find yourself feeling more grumpy or stressed. Not getting enough sleep also can lead to serious problems, including injury, accidents, anxiety, and depression. What might cause poor sleeping? Many things can cause sleep problems, including: · Stress. Stress can be caused by fear about a single event, such as giving a speech. Or you may have ongoing stress, such as worry about work or school. · Depression, anxiety, and other mental or emotional conditions. · Changes in your sleep habits or surroundings. This includes changes that happen where you sleep, such as noise, light, or sleeping in a different bed. It also includes changes in your sleep pattern, such as having jet lag or working a late shift. · Health problems, such as pain, breathing problems, and restless legs syndrome. · Lack of regular exercise. How can you help yourself? Here are some tips that may help you sleep more soundly and wake up feeling more refreshed. Your sleeping area · Use your bedroom only for sleeping and sex. A bit of light reading may help you fall asleep. But if it doesn't, do your reading elsewhere in the house. Don't watch TV in bed. · Be sure your bed is big enough to stretch out comfortably, especially if you have a sleep partner. · Keep your bedroom quiet, dark, and cool. Use curtains, blinds, or a sleep mask to block out light. To block out noise, use earplugs, soothing music, or a \"white noise\" machine. Your evening and bedtime routine · Create a relaxing bedtime routine. You might want to take a warm shower or bath, listen to soothing music, or drink a cup of noncaffeinated tea. · Go to bed at the same time every night. And get up at the same time every morning, even if you feel tired. What to avoid · Limit caffeine (coffee, tea, caffeinated sodas) during the day, and don't have any for at least 4 to 6 hours before bedtime. · Don't drink alcohol before bedtime. Alcohol can cause you to wake up more often during the night. · Don't smoke or use tobacco, especially in the evening. Nicotine can keep you awake. · Don't take naps during the day, especially close to bedtime. · Don't lie in bed awake for too long. If you can't fall asleep, or if you wake up in the middle of the night and can't get back to sleep within 15 minutes or so, get out of bed and go to another room until you feel sleepy. · Don't take medicine right before bed that may keep you awake or make you feel hyper or energized. Your doctor can tell you if your medicine may do this and if you can take it earlier in the day. If you can't sleep · Imagine yourself in a peaceful, pleasant scene. Focus on the details and feelings of being in a place that is relaxing. · Get up and do a quiet or boring activity until you feel sleepy. · Don't drink any liquids after 6 p.m. if you wake up often because you have to go to the bathroom. Where can you learn more? Go to http://amos-chong.info/. Enter V793 in the search box to learn more about \"Learning About Sleeping Well. \" Current as of: July 26, 2016 Content Version: 11.3 © 5300-7427 [employer]. Care instructions adapted under license by [employer] (which disclaims liability or warranty for this information). If you have questions about a medical condition or this instruction, always ask your healthcare professional. [place] [address] any warranty or liability for your use of this information. Colon Cancer Screening: Care Instructions Your Care Instructions Colorectal cancer occurs in the colon or rectum. That's the lower part of your digestive system. It is the second-leading cause of cancer deaths in the [place]. It often starts with small growths called polyps in the colon or rectum. Polyps are usually found with screening tests. Depending on the type of test, any polyps found may be removed during the tests. Colorectal cancer usually does not cause symptoms at first. But regular tests can help find it early, before it spreads and becomes harder to treat. Experts advise routine tests for colon cancer for people starting at age 48. And they advise people with a higher risk of colon cancer to get tested sooner. Talk with your doctor about when you should start testing. Discuss which tests you need. Follow-up care is a key part of your treatment and safety. Be sure to make and go to all appointments, and call your doctor if you are having problems. It's also a good idea to know your test results and keep a list of the medicines you take. What are the main screening tests for colon cancer? · Stool tests.  These include the fecal immunochemical test (FIT) and the fecal occult blood test (FOBT). These tests check stool samples for signs of cancer. If your test is positive, you will need to have a colonoscopy. · Sigmoidoscopy. This test lets your doctor look at the lining of your rectum and the lowest part of your colon. Your doctor uses a lighted tube called a sigmoidoscope. This test can't find cancers or polyps in the upper part of your colon. In some cases, polyps that are found can be removed. But if your doctor finds polyps, you will need to have a colonoscopy to check the upper part of your colon. · Colonoscopy. This test lets your doctor look at the lining of your rectum and your entire colon. The doctor uses a thin, flexible tool called a colonoscope. It can also be used to remove polyps or get a tissue sample (biopsy). What tests do you need? The following guidelines are for people age 48 and over who are not at high risk for colorectal cancer. You may have at least one of these tests as directed by your doctor. · Fecal immunochemical test (FIT) or fecal occult blood test (FOBT) every year · Sigmoidoscopy every 5 years · Colonoscopy every 10 years If you are age 68 to 80, you can work with your doctor to decide if screening is a good option. If you are age 80 or older, your doctor will likely advise that screening is not helpful. Talk with your doctor about when you need to be tested. And discuss which tests are right for you. Your doctor may recommend earlier or more frequent testing if you: 
· Have had colorectal cancer before. · Have had colon polyps. · Have symptoms of colorectal cancer. These include blood in your stool and changes in your bowel habits. · Have a parent, brother or sister, or child with colon polyps or colorectal cancer. · Have a bowel disease. This includes ulcerative colitis and Crohn's disease. · Have a rare polyp syndrome that runs in families, such as familial adenomatous polyposis (FAP). · Have had radiation treatments to the belly or pelvis. When should you call for help? Watch closely for changes in your health, and be sure to contact your doctor if: 
· You have any changes in your bowel habits. · You have any problems. Where can you learn more? Go to http://amos-chong.info/. Enter M541 in the search box to learn more about \"Colon Cancer Screening: Care Instructions. \" Current as of: May 3, 2017 Content Version: 11.3 © 2186-3757 [employer]. Care instructions adapted under license by [employer] (which disclaims liability or warranty for this information). If you have questions about a medical condition or this instruction, always ask your healthcare professional. [address] any warranty or liability for your use of this information. Introducing [facility]! Dear Dodie Knox: Thank you for requesting a [employer] account. Our records indicate that you already have an active [employer] account. You can access your account anytime at [email or web address] Did you know that you can access your hospital and ER discharge instructions at any time in [employer]? You can also review all of your test results from your hospital stay or ER visit. Additional Information If you have questions, please visit the Frequently Asked Questions section of the [employer] website at [email or web address] Remember, [employer] is NOT to be used for urgent needs. For medical emergencies, dial 911. Now available from your iPhone and Android! Please provide this summary of care documentation to your next provider. Your primary care clinician is listed as DEREK Skaggs. If you have any questions after today's visit, please call [phone].

## 2017-09-18 NOTE — PATIENT INSTRUCTIONS
You would benefit from more adequate rest. Please be sure to remove yourself from stimulating activities, like using your cellphone, tablet, or watching T.V. About 2 hours before bedtime. Instead engage in relaxing activities, like reading, yoga, or listening to calming music. Also try to refrain from using these devices in your sleep space or bed, as this trains your brain to stay awake in the bed versus sleeping. Avoid caffeine containing products like coffee, tea, chocolate, and soda within 2 hours of bedtime and try over the counter meds as needed to help like ZZZquil, benadryl, UNISOM or melatonin (they are non-habit forming). Suvorexant (By mouth)   Suvorexant ([id number]-ant)  Treats insomnia. Brand Name(s): Belsomra   There may be other brand names for this medicine. When This Medicine Should Not Be Used: This medicine is not right for everyone. Do not use it if you have narcolepsy. How to Use This Medicine:   Tablet  · Your doctor will tell you how much medicine to use. Do not use more than directed. · You should not take this medicine if you are not able to sleep or rest for at least 7 hours before you need to be active again. · This medicine works better if you do not take it with food or right after a meal.  · This medicine should come with a Medication Guide. Ask your pharmacist for a copy if you do not have one. · Missed dose: This medicine is not taken on a regular schedule. Use it only when you cannot sleep. · Store the medicine in a closed container at room temperature, away from heat, moisture, and direct light. Do not remove the tablets from the blister pack until you are ready to use them. Drugs and Foods to Avoid:   Ask your doctor or pharmacist before using any other medicine, including over-the-counter medicines, vitamins, and herbal products. · Some foods and medicines can affect how suvorexant works.  Tell your doctor if you are using any of the following:  ¨ Aprepitant, boceprevir, carbamazepine, ciprofloxacin, clarithromycin, conivaptan, digoxin, diltiazem, erythromycin, fluconazole, imatinib, nefazodone, phenytoin, rifampin, telaprevir, telithromycin, verapamil  ¨ Medicine to treat HIV (such as amprenavir, atazanavir, fosamprenavir, indinavir, nelfinavir, ritonavir, saquinavir) or medicine to treat a fungal infection (such as itraconazole, ketoconazole, posaconazole)  · Tell your doctor if you use anything else that makes you sleepy. Some examples are allergy medicine, narcotic pain medicine, and alcohol. · Do not eat grapefruit or drink grapefruit juice while you are using this medicine. Warnings While Using This Medicine:   · Tell your doctor if you are pregnant or breastfeeding, or if you have liver disease, breathing or lung problems (such as COPD, sleep apnea), or muscle problems or weakness. Tell your doctor if you have a history of alcohol or drug addiction, depression, or mental illness. · This medicine may cause the following problems:  ¨ Unusual changes in mood or behavior  ¨ Sleep paralysis (while you are going to sleep or waking up)  · This medicine may make you drowsy the next morning. Do not drive or do anything else that could be dangerous until you know how this medicine affects you. · This medicine may cause you to do things while you are still asleep, such as driving or eating. You may not remember doing these things the next morning. Tell your doctor right away if you learn that this has happened. · Call your doctor if you still have trouble sleeping after you take this medicine for 7 to 10 days. · This medicine can be habit-forming. Do not use more than your prescribed dose. Call your doctor if you think your medicine is not working. · Keep all medicine out of the reach of children. Never share your medicine with anyone.   Possible Side Effects While Using This Medicine:   Call your doctor right away if you notice any of these side effects:  · Allergic reaction: Itching or hives, swelling in your face or hands, swelling or tingling in your mouth or throat, chest tightness, trouble breathing  · Anxiety, depression, nervousness, unusual behavior, or thoughts of hurting yourself  · Memory loss  · Seeing, hearing, or feeling things that are not there  · Severe confusion, drowsiness, muscle weakness  · Temporary inability to move or talk while you are going to sleep or waking up  If you notice these less serious side effects, talk with your doctor:   · Daytime drowsiness  If you notice other side effects that you think are caused by this medicine, tell your doctor. Call your doctor for medical advice about side effects. You may report side effects to FDA at 4-024-SAV-8660  © 2017 [address] Information is for End User's use only and may not be sold, redistributed or otherwise used for commercial purposes. The above information is an  only. It is not intended as medical advice for individual conditions or treatments. Talk to your doctor, nurse or pharmacist before following any medical regimen to see if it is safe and effective for you. Learning About Sleeping Well  What does sleeping well mean? Sleeping well means getting enough sleep. How much sleep is enough varies among people. The number of hours you sleep is not as important as how you feel when you wake up. If you do not feel refreshed, you probably need more sleep. Another sign of not getting enough sleep is feeling tired during the day. The average total nightly sleep time is 7½ to 8 hours. Healthy adults may need a little more or a little less than this. Why is getting enough sleep important? Getting enough quality sleep is a basic part of good health. When your sleep suffers, your mood and your thoughts can suffer too. You may find yourself feeling more grumpy or stressed.  Not getting enough sleep also can lead to serious problems, including injury, accidents, anxiety, and depression. What might cause poor sleeping? Many things can cause sleep problems, including:  · Stress. Stress can be caused by fear about a single event, such as giving a speech. Or you may have ongoing stress, such as worry about work or school. · Depression, anxiety, and other mental or emotional conditions. · Changes in your sleep habits or surroundings. This includes changes that happen where you sleep, such as noise, light, or sleeping in a different bed. It also includes changes in your sleep pattern, such as having jet lag or working a late shift. · Health problems, such as pain, breathing problems, and restless legs syndrome. · Lack of regular exercise. How can you help yourself? Here are some tips that may help you sleep more soundly and wake up feeling more refreshed. Your sleeping area  · Use your bedroom only for sleeping and sex. A bit of light reading may help you fall asleep. But if it doesn't, do your reading elsewhere in the house. Don't watch TV in bed. · Be sure your bed is big enough to stretch out comfortably, especially if you have a sleep partner. · Keep your bedroom quiet, dark, and cool. Use curtains, blinds, or a sleep mask to block out light. To block out noise, use earplugs, soothing music, or a \"white noise\" machine. Your evening and bedtime routine  · Create a relaxing bedtime routine. You might want to take a warm shower or bath, listen to soothing music, or drink a cup of noncaffeinated tea. · Go to bed at the same time every night. And get up at the same time every morning, even if you feel tired. What to avoid  · Limit caffeine (coffee, tea, caffeinated sodas) during the day, and don't have any for at least 4 to 6 hours before bedtime. · Don't drink alcohol before bedtime. Alcohol can cause you to wake up more often during the night. · Don't smoke or use tobacco, especially in the evening. Nicotine can keep you awake.   · Don't take naps during the day, especially close to bedtime. · Don't lie in bed awake for too long. If you can't fall asleep, or if you wake up in the middle of the night and can't get back to sleep within 15 minutes or so, get out of bed and go to another room until you feel sleepy. · Don't take medicine right before bed that may keep you awake or make you feel hyper or energized. Your doctor can tell you if your medicine may do this and if you can take it earlier in the day. If you can't sleep  · Imagine yourself in a peaceful, pleasant scene. Focus on the details and feelings of being in a place that is relaxing. · Get up and do a quiet or boring activity until you feel sleepy. · Don't drink any liquids after 6 p.m. if you wake up often because you have to go to the bathroom. Where can you learn more? Go to http://amosSkillSlatechong.info/. Enter K873 in the search box to learn more about \"Learning About Sleeping Well. \"  Current as of: July 26, 2016  Content Version: 11.3  © 2539-5340 [employer]. Care instructions adapted under license by [employer] (which disclaims liability or warranty for this information). If you have questions about a medical condition or this instruction, always ask your healthcare professional. [address] any warranty or liability for your use of this information. Colon Cancer Screening: Care Instructions  Your Care Instructions    Colorectal cancer occurs in the colon or rectum. That's the lower part of your digestive system. It is the second-leading cause of cancer deaths in the [place]. It often starts with small growths called polyps in the colon or rectum. Polyps are usually found with screening tests. Depending on the type of test, any polyps found may be removed during the tests. Colorectal cancer usually does not cause symptoms at first. But regular tests can help find it early, before it spreads and becomes harder to treat. Experts advise routine tests for colon cancer for people starting at age 48. And they advise people with a higher risk of colon cancer to get tested sooner. Talk with your doctor about when you should start testing. Discuss which tests you need. Follow-up care is a key part of your treatment and safety. Be sure to make and go to all appointments, and call your doctor if you are having problems. It's also a good idea to know your test results and keep a list of the medicines you take. What are the main screening tests for colon cancer? · Stool tests. These include the fecal immunochemical test (FIT) and the fecal occult blood test (FOBT). These tests check stool samples for signs of cancer. If your test is positive, you will need to have a colonoscopy. · Sigmoidoscopy. This test lets your doctor look at the lining of your rectum and the lowest part of your colon. Your doctor uses a lighted tube called a sigmoidoscope. This test can't find cancers or polyps in the upper part of your colon. In some cases, polyps that are found can be removed. But if your doctor finds polyps, you will need to have a colonoscopy to check the upper part of your colon. · Colonoscopy. This test lets your doctor look at the lining of your rectum and your entire colon. The doctor uses a thin, flexible tool called a colonoscope. It can also be used to remove polyps or get a tissue sample (biopsy). What tests do you need? The following guidelines are for people age 48 and over who are not at high risk for colorectal cancer. You may have at least one of these tests as directed by your doctor. · Fecal immunochemical test (FIT) or fecal occult blood test (FOBT) every year  · Sigmoidoscopy every 5 years  · Colonoscopy every 10 years  If you are age 68 to 80, you can work with your doctor to decide if screening is a good option. If you are age 80 or older, your doctor will likely advise that screening is not helpful.   Talk with your doctor about when you need to be tested. And discuss which tests are right for you. Your doctor may recommend earlier or more frequent testing if you:  · Have had colorectal cancer before. · Have had colon polyps. · Have symptoms of colorectal cancer. These include blood in your stool and changes in your bowel habits. · Have a parent, brother or sister, or child with colon polyps or colorectal cancer. · Have a bowel disease. This includes ulcerative colitis and Crohn's disease. · Have a rare polyp syndrome that runs in families, such as familial adenomatous polyposis (FAP). · Have had radiation treatments to the belly or pelvis. When should you call for help? Watch closely for changes in your health, and be sure to contact your doctor if:  · You have any changes in your bowel habits. · You have any problems. Where can you learn more? Go to http://amos-chong.info/. Enter M541 in the search box to learn more about \"Colon Cancer Screening: Care Instructions. \"  Current as of: May 3, 2017  Content Version: 11.3  © 6757-0386 [employer]. Care instructions adapted under license by [employer] (which disclaims liability or warranty for this information). If you have questions about a medical condition or this instruction, always ask your healthcare professional. [address] any warranty or liability for your use of this information.

## 2017-09-18 NOTE — PROGRESS NOTES
Pt is here for   Chief Complaint   Patient presents with    Follow-up     3 month for PCV, FBOT    Medication Refill     orders pending. .. pt states that she would like a new allergy medication because the xyzal isn't covered by the insurance pt states she would like something for sleep    Labs     pt states she would like labs to check her thyroid levels, and her liver enzymes       Pt states pain level is a 5 lower back    1. Have you been to the ER, urgent care clinic since your last visit? Hospitalized since your last visit? No    2. Have you seen or consulted any other health care providers outside of the [address] [place] [address] since your last visit? Include any pap smears or colon screening.  No

## 2017-09-22 LAB
ALBUMIN SERPL-MCNC: 4.4 G/DL (ref 3.5–5.5)
ALBUMIN/GLOB SERPL: 1.7 {RATIO} (ref 1.2–2.2)
ALP SERPL-CCNC: 63 IU/L (ref 39–117)
ALT SERPL-CCNC: 19 IU/L (ref 0–32)
AST SERPL-CCNC: 21 IU/L (ref 0–40)
BASOPHILS # BLD AUTO: 0 X10E3/UL (ref 0–0.2)
BASOPHILS NFR BLD AUTO: 0 %
BILIRUB SERPL-MCNC: <0.2 MG/DL (ref 0–1.2)
BUN SERPL-MCNC: 21 MG/DL (ref 6–24)
BUN/CREAT SERPL: 30 (ref 9–23)
CALCIUM SERPL-MCNC: 9.6 MG/DL (ref 8.7–10.2)
CHLORIDE SERPL-SCNC: 99 MMOL/L (ref 96–106)
CHOLEST SERPL-MCNC: 188 MG/DL (ref 100–199)
CO2 SERPL-SCNC: 26 MMOL/L (ref 18–29)
CREAT SERPL-MCNC: 0.7 MG/DL (ref 0.57–1)
EOSINOPHIL # BLD AUTO: 0.2 X10E3/UL (ref 0–0.4)
EOSINOPHIL NFR BLD AUTO: 3 %
ERYTHROCYTE [DISTWIDTH] IN BLOOD BY AUTOMATED COUNT: 14.1 % (ref 12.3–15.4)
EST. AVERAGE GLUCOSE BLD GHB EST-MCNC: 105 MG/DL
GLOBULIN SER CALC-MCNC: 2.6 G/DL (ref 1.5–4.5)
GLUCOSE SERPL-MCNC: 91 MG/DL (ref 65–99)
HBA1C MFR BLD: 5.3 % (ref 4.8–5.6)
HCT VFR BLD AUTO: 39.2 % (ref 34–46.6)
HDLC SERPL-MCNC: 51 MG/DL
HEMOCCULT STL QL IA: NORMAL
HGB BLD-MCNC: 12.8 G/DL (ref 11.1–15.9)
IMM GRANULOCYTES # BLD: 0 X10E3/UL (ref 0–0.1)
IMM GRANULOCYTES NFR BLD: 0 %
INTERPRETATION, 910389: NORMAL
LDLC SERPL CALC-MCNC: 105 MG/DL (ref 0–99)
LYMPHOCYTES # BLD AUTO: 3.5 X10E3/UL (ref 0.7–3.1)
LYMPHOCYTES NFR BLD AUTO: 49 %
MCH RBC QN AUTO: 30 PG (ref 26.6–33)
MCHC RBC AUTO-ENTMCNC: 32.7 G/DL (ref 31.5–35.7)
MCV RBC AUTO: 92 FL (ref 79–97)
MONOCYTES # BLD AUTO: 0.7 X10E3/UL (ref 0.1–0.9)
MONOCYTES NFR BLD AUTO: 10 %
NEUTROPHILS # BLD AUTO: 2.7 X10E3/UL (ref 1.4–7)
NEUTROPHILS NFR BLD AUTO: 38 %
PLATELET # BLD AUTO: 249 X10E3/UL (ref 150–379)
POTASSIUM SERPL-SCNC: 4.5 MMOL/L (ref 3.5–5.2)
PROT SERPL-MCNC: 7 G/DL (ref 6–8.5)
RBC # BLD AUTO: 4.27 X10E6/UL (ref 3.77–5.28)
SODIUM SERPL-SCNC: 140 MMOL/L (ref 134–144)
TRIGL SERPL-MCNC: 158 MG/DL (ref 0–149)
TSH SERPL DL<=0.005 MIU/L-ACNC: 2.32 UIU/ML (ref 0.45–4.5)
VLDLC SERPL CALC-MCNC: 32 MG/DL (ref 5–40)
WBC # BLD AUTO: 7.2 X10E3/UL (ref 3.4–10.8)

## 2017-09-25 LAB
HEMOCCULT STL QL IA: NEGATIVE
PLEASE NOTE:, 188601: NORMAL

## 2017-10-16 ENCOUNTER — TELEPHONE (OUTPATIENT)
Dept: INTERNAL MEDICINE CLINIC | Age: 57
End: 2017-10-16

## 2017-10-16 RX ORDER — TRAZODONE HYDROCHLORIDE 50 MG/1
TABLET ORAL
Qty: 60 TAB | Refills: 11 | Status: SHIPPED | OUTPATIENT
Start: 2017-10-16 | End: 2019-04-29 | Stop reason: SDUPTHER

## 2017-10-16 NOTE — TELEPHONE ENCOUNTER
Pt requesting for an order for \"Trazodone\". Pt stated, she was unable to refill the medication at [facility] in pt's file [phone]. Pt stated, she needs the medication to help sleep after the lost of her fiance. Pt stated, she would like to know if she should come in to be seen.  Best contact number [phone].

## 2017-10-16 NOTE — TELEPHONE ENCOUNTER
No she doesn't need an office visit, she is just changing her report of trazodone's effectiveness. I will restart. If this is NOT helpful, she will need an office visit to discuss.

## 2017-10-16 NOTE — TELEPHONE ENCOUNTER
Pt states the Belsomra has a copay of $80. She states too that the trazadone not only helps her sleep but has a component for depression  And she lost her fiance 3 weeks ago and is depressed. Can you please give her the trazadone or does she need an office visit to get it.

## 2017-10-16 NOTE — TELEPHONE ENCOUNTER
I prescribed Belsomra, was she able to start this? It is to help with sleep since she reported a lack of improvement with trazodone use last OV.

## 2017-10-17 NOTE — TELEPHONE ENCOUNTER
Pt was called 10/17/17 @ 8:07 a m and informed rx was sent to the pharmacy and  If it does not work she will need an office visit.

## 2017-12-13 ENCOUNTER — HOSPITAL ENCOUNTER (OUTPATIENT)
Dept: MAMMOGRAPHY | Age: 57
Discharge: HOME | End: 2017-12-13
Attending: NURSE PRACTITIONER
Payer: COMMERCIAL

## 2017-12-13 DIAGNOSIS — Z85.3 HISTORY OF LEFT BREAST CANCER: ICD-10-CM

## 2017-12-13 DIAGNOSIS — Z12.31 VISIT FOR SCREENING MAMMOGRAM: ICD-10-CM

## 2017-12-13 PROCEDURE — 77066 DX MAMMO INCL CAD BI: CPT

## 2018-03-20 ENCOUNTER — OFFICE VISIT (OUTPATIENT)
Dept: INTERNAL MEDICINE CLINIC | Age: 58
End: 2018-03-20

## 2018-03-20 VITALS
RESPIRATION RATE: 18 BRPM | HEIGHT: 66 IN | HEART RATE: 62 BPM | OXYGEN SATURATION: 97 % | DIASTOLIC BLOOD PRESSURE: 82 MMHG | WEIGHT: 202 LBS | SYSTOLIC BLOOD PRESSURE: 125 MMHG | BODY MASS INDEX: 32.47 KG/M2 | TEMPERATURE: 98.2 F

## 2018-03-20 DIAGNOSIS — Z13.228 SCREENING FOR ENDOCRINE, NUTRITIONAL, METABOLIC AND IMMUNITY DISORDER: ICD-10-CM

## 2018-03-20 DIAGNOSIS — Z00.00 ADULT GENERAL MEDICAL EXAMINATION: Primary | ICD-10-CM

## 2018-03-20 DIAGNOSIS — Z87.891 FORMER SMOKER: ICD-10-CM

## 2018-03-20 DIAGNOSIS — Z13.29 SCREENING FOR ENDOCRINE, NUTRITIONAL, METABOLIC AND IMMUNITY DISORDER: ICD-10-CM

## 2018-03-20 DIAGNOSIS — Z85.3 HISTORY OF BREAST CANCER: ICD-10-CM

## 2018-03-20 DIAGNOSIS — Z13.21 SCREENING FOR ENDOCRINE, NUTRITIONAL, METABOLIC AND IMMUNITY DISORDER: ICD-10-CM

## 2018-03-20 DIAGNOSIS — Z13.0 SCREENING FOR ENDOCRINE, NUTRITIONAL, METABOLIC AND IMMUNITY DISORDER: ICD-10-CM

## 2018-03-20 NOTE — MR AVS SNAPSHOT
[employer]ora [employer] 
 
 
 [address] [facility] [address] [address] [phone] Patient: Marley Dickerson MRN: [id number] Visit Information Date & Time Provider Department Dept. Phone Encounter #  
 3/20/2018 10:20 AM Madi Pike NP [address] [facility] [phone] [id number] Follow-up Instructions Return in about 6 months (around 9/20/2018) for BMI. Upcoming Health Maintenance Date Due  
 PAP AKA CERVICAL CYTOLOGY 9/18/2018 FOBT Q 1 YEAR AGE 50-75 9/22/2018 Pneumococcal 19-64 Highest Risk (2 of 3 - PCV13) 3/20/2019 BREAST CANCER SCRN MAMMOGRAM 12/13/2019 DTaP/Tdap/Td series (2 - Td) 1/1/2024 Allergies as of 3/20/2018  Review Complete On: 3/20/2018 By: Rosanna Duran LPN Severity Noted Reaction Type Reactions Pcn [Penicillins] High 04/08/2013    Anaphylaxis Current Immunizations  Never Reviewed Name Date Influenza Vaccine 11/1/2014 Not reviewed this visit You Were Diagnosed With   
  
 Codes Comments Adult general medical examination    -  Primary ICD-10-CM: Z00.00 ICD-9-CM: V70.9 Screening for endocrine, nutritional, metabolic and immunity disorder     ICD-10-CM: Z13.29, Z13.21, Z13.228, Z13.0 ICD-9-CM: V77.99 History of breast cancer     ICD-10-CM: Z85.3 ICD-9-CM: V10.3 Former smoker     ICD-10-CM: C60.402 ICD-9-CM: V15.82 BMI 32.0-32.9,adult     ICD-10-CM: T82.72 
ICD-9-CM: V85.32 Vitals BP Pulse Temp Resp Height(growth percentile) Weight(growth percentile) 125/82 (BP 1 Location: Right arm, BP Patient Position: Sitting) 62 98.2 °F (36.8 °C) (Oral) 18 5' 6\" (1.676 m) 202 lb (91.6 kg) SpO2 BMI OB Status Smoking Status 97% 32.6 kg/m2 Postmenopausal Former Smoker Vitals History BMI and BSA Data Body Mass Index Body Surface Area  
 32.6 kg/m 2 2.07 m 2 Preferred Pharmacy Pharmacy Name Phone [facility] [address] [place] [address] [phone] Your Updated Medication List  
  
   
This list is accurate as of 3/20/18 11:15 AM.  Always use your most recent med list.  
  
  
  
  
 diclofenac 1 % Gel Commonly known as:  VOLTAREN Apply 4 g to affected area every six (6) hours. docusate sodium 100 mg capsule Commonly known as:  Alfonse Kras Take 1 Cap by mouth two (2) times a day. May use over-the counter equivalent instead. Take twice daily while on narcotic pain reliever to prevent constipation. fiber Cap Take 2 Caps by mouth daily. fluticasone 50 mcg/actuation nasal spray Commonly known as:  Zuly [place] 2 Sprays by Both Nostrils route daily. levocetirizine 5 mg tablet Commonly known as:  Stephanie Scripture Take 1 Tab by mouth daily. magnesium oxide 400 mg tablet Commonly known as:  MAG-OX Take 400 mg by mouth daily. meloxicam 7.5 mg tablet Commonly known as:  MOBIC Take 1 Tab by mouth daily. methocarbamol 500 mg tablet Commonly known as:  ROBAXIN Take 1 Tab by mouth four (4) times daily as needed for Pain (m. spasms). Do NOT Drive, may cause drowsiness  
  
 omega 3-DHA-EPA-fish oil 1,000 mg (120 mg-180 mg) capsule Take 1 Cap by mouth daily. OSTEO BI-FLEX PO Take  by mouth. raNITIdine 150 mg tablet Commonly known as:  ZANTAC  
300 mg two (2) times a day. traZODone 50 mg tablet Commonly known as:  Zackery Rangel Take 1-2 tabs every night for sleep. VITAMIN D3 1,000 unit tablet Generic drug:  cholecalciferol Take  by mouth daily. We Performed the Following CBC W/O DIFF [75917 CPT(R)] METABOLIC PANEL, COMPREHENSIVE [75715 CPT(R)] Follow-up Instructions Return in about 6 months (around 9/20/2018) for BMI. To-Do List   
 03/20/2018 Imaging:  CT LOW DOSE LUNG CANCER SCREENING Referral Information Referral ID Referred By Referred To [id number] Naveen Tripathi I Not Available Visits Status Start Date End Date 1 New Request 3/20/18 3/20/19 If your referral has a status of pending review or denied, additional information will be sent to support the outcome of this decision. Patient Instructions Well Visit, Women 48 to 72: Care Instructions Your Care Instructions Physical exams can help you stay healthy. Your doctor has checked your overall health and may have suggested ways to take good care of yourself. He or she also may have recommended tests. At home, you can help prevent illness with healthy eating, regular exercise, and other steps. Follow-up care is a key part of your treatment and safety. Be sure to make and go to all appointments, and call your doctor if you are having problems. It's also a good idea to know your test results and keep a list of the medicines you take. How can you care for yourself at home? · Reach and stay at a healthy weight. This will lower your risk for many problems, such as obesity, diabetes, heart disease, and high blood pressure. · Get at least 30 minutes of exercise on most days of the week. Walking is a good choice. You also may want to do other activities, such as running, swimming, cycling, or playing tennis or team sports. · Do not smoke. Smoking can make health problems worse. If you need help quitting, talk to your doctor about stop-smoking programs and medicines. These can increase your chances of quitting for good. · Protect your skin from too much sun. When you're outdoors from 10 a.m. to 4 p.m., stay in the shade or cover up with clothing and a hat with a wide brim. Wear sunglasses that block UV rays. Even when it's cloudy, put broad-spectrum sunscreen (SPF 30 or higher) on any exposed skin. · See a dentist one or two times a year for checkups and to have your teeth cleaned. · Wear a seat belt in the car. · Limit alcohol to 1 drink a day. Too much alcohol can cause health problems. Follow your doctor's advice about when to have certain tests. These tests can spot problems early. · Cholesterol. Your doctor will tell you how often to have this done based on your age, family history, or other things that can increase your risk for heart attack and stroke. · Blood pressure. Have your blood pressure checked during a routine doctor visit. Your doctor will tell you how often to check your blood pressure based on your age, your blood pressure results, and other factors. · Mammogram. Ask your doctor how often you should have a mammogram, which is an X-ray of your breasts. A mammogram can spot breast cancer before it can be felt and when it is easiest to treat. · Pap test and pelvic exam. Ask your doctor how often you should have a Pap test. You may not need to have a Pap test as often as you used to. · Vision. Have your eyes checked every year or two or as often as your doctor suggests. Some experts recommend that you have yearly exams for glaucoma and other age-related eye problems starting at age 48. · Hearing. Tell your doctor if you notice any change in your hearing. You can have tests to find out how well you hear. · Diabetes. Ask your doctor whether you should have tests for diabetes. · Colon cancer. You should begin tests for colon cancer at age 48. You may have one of several tests. Your doctor will tell you how often to have tests based on your age and risk. Risks include whether you already had a precancerous polyp removed from your colon or whether your parents, sisters and brothers, or children have had colon cancer. · Thyroid disease. Talk to your doctor about whether to have your thyroid checked as part of a regular physical exam. Women have an increased chance of a thyroid problem. · Osteoporosis. You should begin tests for bone density at age 72.  If you are younger than 72, ask your doctor whether you have factors that may increase your risk for this disease. You may want to have this test before age 72. · Heart attack and stroke risk. At least every 4 to 6 years, you should have your risk for heart attack and stroke assessed. Your doctor uses factors such as your age, blood pressure, cholesterol, and whether you smoke or have diabetes to show what your risk for a heart attack or stroke is over the next 10 years. When should you call for help? Watch closely for changes in your health, and be sure to contact your doctor if you have any problems or symptoms that concern you. Where can you learn more? Go to http://amos-chong.info/. Enter N253 in the search box to learn more about \"Well Visit, Women 50 to 72: Care Instructions. \" Current as of: May 12, 2017 Content Version: 11.4 © 5257-4871 [employer]. Care instructions adapted under license by [employer] (which disclaims liability or warranty for this information). If you have questions about a medical condition or this instruction, always ask your healthcare professional. [address] any warranty or liability for your use of this information. Learning About Lung Cancer Screening What is screening for lung cancer? Lung cancer screening is a way to find some lung cancers early, when a cure is more likely. Lung cancer screening may help those who have the highest risk for lung cancer-people 55 and older who are or were heavy smokers. For most people, who aren't at increased risk, screening for lung cancer probably isn't helpful. Screening won't prevent cancer. And it may not find all lung cancers. Lung cancer screening may lower the risk of dying from lung cancer in a small number of people. How is it done?  
Lung cancer screening is done with a low-dose CT (computed tomography) scan. A CT scan uses X-rays, or radiation, to make detailed pictures of your body. Experts recommend that screening be done in medical centers that focus on finding and treating lung cancer. Who is screening recommended for? Lung screening is only recommended for people who are or were heavy smokers. That means people with a smoking history of at least 30 pack years. A pack year is a way to measure how heavy a smoker you are or were. To figure out your pack years, multiply how many packs a day on average (assuming 20 cigarettes per pack) you have smoked by how many years you have smoked. For example: · If you smoked 1 pack a day for 15 years, that's 1 times 15. So you have a smoking history of 15 pack years. · If you smoked 2 packs a day for 15 years, that's 2 times 15. So you have a smoking history of 30 pack years. The [place]. Preventive Services Task Force recommends lung cancer screening if: 
· You are 54to [de-identified]years old. · And you have a smoking history of at least 30 pack years. · And you still smoke, or you quit within the last 15 years. · And you are not in poor health overall. (Having a serious health problem might mean that you couldn't have treatment for lung cancer. The treatment could be too high-risk, and it might not help you live longer.) What are the risks of screening? CT screening for lung cancer isn't perfect. It can show an abnormal result when it turns out there was not any cancer. This is called a false-positive result. This means you may need more tests to make sure you don't have cancer. These tests can be harmful and cause a lot of worry. These tests may include more CT scans and invasive testing like a lung biopsy. In a biopsy, the doctor takes a sample of tissue from inside your lung so it can be looked at under a microscope. A biopsy is the only way to tell if you have lung cancer. If the biopsy finds cancer, you and your doctor will have to decide how or whether to treat it. Some lung cancers found on CT scans are harmless and would not have caused a problem if they had not been found through screening. But because doctors can't tell which ones will turn out to be harmless, most will be treated. This means that you may get treatment-including surgery, radiation, or chemotherapy-that you don't need. There is a small chance of getting cancer from being exposed to radiation. A low-dose CT scan uses more radiation than a regular chest X-ray. But it uses much less than a regular-dose CT scan. You and your doctor will decide if the possibility of finding lung cancer early is worth the risk of having this test and being exposed to the radiation. What are the benefits of screening? Your scan may be normal (negative). For some people who are at higher risk, screening lowers the chance of dying of lung cancer. How much and how long you smoked helps to determine your risk level. Screening can find some cancers early, when treatment may be more likely to work. What happens after screening? The results of your CT scan will be sent to your doctor. Someone from your care team will explain the results of your scan and answer any questions you may have. If you need any follow-up, he or she will help you understand what to do next. After a lung cancer screening, you can go back to your usual activities right away. A lung cancer screening test can't tell if you have lung cancer. If your results are positive, your doctor can't tell whether an abnormal finding is a harmless nodule, cancer, or something else without doing more tests. What can you do to prevent lung cancer? Don't smoke. Most lung cancers are caused by smoking. If you have already quit smoking, you've taken the best step you can to prevent lung cancer. And if you still smoke, the best way to lower your chance of getting or dying from lung cancer is to quit. Your doctor may recommend medicines that can help you quit. Follow-up care is a key part of your treatment and safety. Be sure to make and go to all appointments, and call your doctor if you are having problems. It's also a good idea to know your test results and keep a list of the medicines you take. Where can you learn more? Go to http://amos-chong.info/. Enter [phone] in the search box to learn more about \"Learning About Lung Cancer Screening. \" Current as of: May 12, 2017 Content Version: 11.4 © 8751-2395 [employer]. Care instructions adapted under license by [employer] (which disclaims liability or warranty for this information). If you have questions about a medical condition or this instruction, always ask your healthcare professional. [address] any warranty or liability for your use of this information. Introducing [facility] [place] [facility]! Dear Yamileth Wang: Thank you for requesting a [employer] account. Our records indicate that you already have an active Medivo account. You can access your account anytime at [email or web address] Did you know that you can access your hospital and ER discharge instructions at any time in Medivo? You can also review all of your test results from your hospital stay or ER visit. Additional Information If you have questions, please visit the Frequently Asked Questions section of the Medivo website at [email or web address] Remember, Medivo is NOT to be used for urgent needs. For medical emergencies, dial 911. Now available from your iPhone and Android! Please provide this summary of care documentation to your next provider. Your primary care clinician is listed as DEREK Uribe. If you have any questions after today's visit, please call [phone].

## 2018-03-20 NOTE — PATIENT INSTRUCTIONS
Well Visit, Women 48 to 72: Care Instructions  Your Care Instructions    Physical exams can help you stay healthy. Your doctor has checked your overall health and may have suggested ways to take good care of yourself. He or she also may have recommended tests. At home, you can help prevent illness with healthy eating, regular exercise, and other steps. Follow-up care is a key part of your treatment and safety. Be sure to make and go to all appointments, and call your doctor if you are having problems. It's also a good idea to know your test results and keep a list of the medicines you take. How can you care for yourself at home? · Reach and stay at a healthy weight. This will lower your risk for many problems, such as obesity, diabetes, heart disease, and high blood pressure. · Get at least 30 minutes of exercise on most days of the week. Walking is a good choice. You also may want to do other activities, such as running, swimming, cycling, or playing tennis or team sports. · Do not smoke. Smoking can make health problems worse. If you need help quitting, talk to your doctor about stop-smoking programs and medicines. These can increase your chances of quitting for good. · Protect your skin from too much sun. When you're outdoors from 10 a.m. to 4 p.m., stay in the shade or cover up with clothing and a hat with a wide brim. Wear sunglasses that block UV rays. Even when it's cloudy, put broad-spectrum sunscreen (SPF 30 or higher) on any exposed skin. · See a dentist one or two times a year for checkups and to have your teeth cleaned. · Wear a seat belt in the car. · Limit alcohol to 1 drink a day. Too much alcohol can cause health problems. Follow your doctor's advice about when to have certain tests. These tests can spot problems early. · Cholesterol.  Your doctor will tell you how often to have this done based on your age, family history, or other things that can increase your risk for heart attack and stroke. · Blood pressure. Have your blood pressure checked during a routine doctor visit. Your doctor will tell you how often to check your blood pressure based on your age, your blood pressure results, and other factors. · Mammogram. Ask your doctor how often you should have a mammogram, which is an X-ray of your breasts. A mammogram can spot breast cancer before it can be felt and when it is easiest to treat. · Pap test and pelvic exam. Ask your doctor how often you should have a Pap test. You may not need to have a Pap test as often as you used to. · Vision. Have your eyes checked every year or two or as often as your doctor suggests. Some experts recommend that you have yearly exams for glaucoma and other age-related eye problems starting at age 48. · Hearing. Tell your doctor if you notice any change in your hearing. You can have tests to find out how well you hear. · Diabetes. Ask your doctor whether you should have tests for diabetes. · Colon cancer. You should begin tests for colon cancer at age 48. You may have one of several tests. Your doctor will tell you how often to have tests based on your age and risk. Risks include whether you already had a precancerous polyp removed from your colon or whether your parents, sisters and brothers, or children have had colon cancer. · Thyroid disease. Talk to your doctor about whether to have your thyroid checked as part of a regular physical exam. Women have an increased chance of a thyroid problem. · Osteoporosis. You should begin tests for bone density at age 72. If you are younger than 72, ask your doctor whether you have factors that may increase your risk for this disease. You may want to have this test before age 72. · Heart attack and stroke risk. At least every 4 to 6 years, you should have your risk for heart attack and stroke assessed.  Your doctor uses factors such as your age, blood pressure, cholesterol, and whether you smoke or have diabetes to show what your risk for a heart attack or stroke is over the next 10 years. When should you call for help? Watch closely for changes in your health, and be sure to contact your doctor if you have any problems or symptoms that concern you. Where can you learn more? Go to http://amos-chong.info/. Enter V011 in the search box to learn more about \"Well Visit, Women 50 to 72: Care Instructions. \"  Current as of: May 12, 2017  Content Version: 11.4  © 5522-3952 [employer]. Care instructions adapted under license by [employer] (which disclaims liability or warranty for this information). If you have questions about a medical condition or this instruction, always ask your healthcare professional. [address] any warranty or liability for your use of this information. Learning About Lung Cancer Screening  What is screening for lung cancer? Lung cancer screening is a way to find some lung cancers early, when a cure is more likely. Lung cancer screening may help those who have the highest risk for lung cancer-people 55 and older who are or were heavy smokers. For most people, who aren't at increased risk, screening for lung cancer probably isn't helpful. Screening won't prevent cancer. And it may not find all lung cancers. Lung cancer screening may lower the risk of dying from lung cancer in a small number of people. How is it done? Lung cancer screening is done with a low-dose CT (computed tomography) scan. A CT scan uses X-rays, or radiation, to make detailed pictures of your body. Experts recommend that screening be done in medical centers that focus on finding and treating lung cancer. Who is screening recommended for? Lung screening is only recommended for people who are or were heavy smokers. That means people with a smoking history of at least 30 pack years. A pack year is a way to measure how heavy a smoker you are or were.   To figure out your pack years, multiply how many packs a day on average (assuming 20 cigarettes per pack) you have smoked by how many years you have smoked. For example:  · If you smoked 1 pack a day for 15 years, that's 1 times 15. So you have a smoking history of 15 pack years. · If you smoked 2 packs a day for 15 years, that's 2 times 15. So you have a smoking history of 30 pack years. The [place]. Preventive Services Task Force recommends lung cancer screening if:  · You are 54to [de-identified]years old. · And you have a smoking history of at least 30 pack years. · And you still smoke, or you quit within the last 15 years. · And you are not in poor health overall. (Having a serious health problem might mean that you couldn't have treatment for lung cancer. The treatment could be too high-risk, and it might not help you live longer.)  What are the risks of screening? CT screening for lung cancer isn't perfect. It can show an abnormal result when it turns out there was not any cancer. This is called a false-positive result. This means you may need more tests to make sure you don't have cancer. These tests can be harmful and cause a lot of worry. These tests may include more CT scans and invasive testing like a lung biopsy. In a biopsy, the doctor takes a sample of tissue from inside your lung so it can be looked at under a microscope. A biopsy is the only way to tell if you have lung cancer. If the biopsy finds cancer, you and your doctor will have to decide how or whether to treat it. Some lung cancers found on CT scans are harmless and would not have caused a problem if they had not been found through screening. But because doctors can't tell which ones will turn out to be harmless, most will be treated. This means that you may get treatment-including surgery, radiation, or chemotherapy-that you don't need. There is a small chance of getting cancer from being exposed to radiation.  A low-dose CT scan uses more radiation than a regular chest X-ray. But it uses much less than a regular-dose CT scan. You and your doctor will decide if the possibility of finding lung cancer early is worth the risk of having this test and being exposed to the radiation. What are the benefits of screening? Your scan may be normal (negative). For some people who are at higher risk, screening lowers the chance of dying of lung cancer. How much and how long you smoked helps to determine your risk level. Screening can find some cancers early, when treatment may be more likely to work. What happens after screening? The results of your CT scan will be sent to your doctor. Someone from your care team will explain the results of your scan and answer any questions you may have. If you need any follow-up, he or she will help you understand what to do next. After a lung cancer screening, you can go back to your usual activities right away. A lung cancer screening test can't tell if you have lung cancer. If your results are positive, your doctor can't tell whether an abnormal finding is a harmless nodule, cancer, or something else without doing more tests. What can you do to prevent lung cancer? Don't smoke. Most lung cancers are caused by smoking. If you have already quit smoking, you've taken the best step you can to prevent lung cancer. And if you still smoke, the best way to lower your chance of getting or dying from lung cancer is to quit. Your doctor may recommend medicines that can help you quit. Follow-up care is a key part of your treatment and safety. Be sure to make and go to all appointments, and call your doctor if you are having problems. It's also a good idea to know your test results and keep a list of the medicines you take. Where can you learn more? Go to http://amos-chong.info/. Enter [phone] in the search box to learn more about \"Learning About Lung Cancer Screening. \"  Current as of:  May 12, 2017  Content Version: 11.4  © 1157-4137 [employer]. Care instructions adapted under license by [employer] (which disclaims liability or warranty for this information). If you have questions about a medical condition or this instruction, always ask your healthcare professional. [address] any warranty or liability for your use of this information.

## 2018-03-20 NOTE — PROGRESS NOTES
Vernon Ramirez is a 61 y.o. female and presents with Complete Physical (pt is here for a physical)    Subjective:  Vernon Ramirez is a 61 y.o. female presenting for annual checkup. ROS: Feeling well. No dyspnea or chest pain on exertion. No abdominal pain, change in bowel habits, black or bloody stools. No urinary tract or prostatic symptoms. No neurological complaints. Specific concerns today: had indigestion with endoscopy done, started on antifungal and probiotic. Also taking ranitidine now. Exercising more lately. Taking trazodone and melatonin to help with sleep, feels it is working great. Discussed ADVANCED DIRECTIVE:yes  Advanced Directive on File: no  Last BM: today, normal  Dental exam in past 12 months: yes  Eye exam in past 12 months: yes, glaucoma suspect      Review of Systems  Constitutional: negative for fevers, chills, anorexia and weight loss  Eyes:   +floaters. negative for visual disturbance, drainage, and irritation  ENT:   + NC. negative for tinnitus,sore throat,ear pain,and hoarseness  Respiratory:  negative for cough, hemoptysis, dyspnea, and wheezing  CV:   negative for chest pain, palpitations, and lower extremity edema  GI:   negative for nausea, vomiting, diarrhea, abdominal pain, and melena  Endo:               +polyuria. negative for polydipsia,polyphagia, and heat intolerance  Genitourinary: negative for frequency, urgency, dysuria, retention, and hematuria  Integument:  negative for rash, ulcerations, and pruritus  Hematologic:  negative for easy bruising and bleeding  Musculoskel: negative for arthralgias, muscle weakness,and joint pain/swelling  Neurological:  negative for headaches, dizziness, vertigo,and memory/gait problems  Behavl/Psych: +anx/depression.  negative for feelings of anxiety, depression, suicide, and mood changes    Past Medical History:   Diagnosis Date    Arthritis     Cancer ([facility] [address]     skin - leg    Chronic pain     GERD (gastroesophageal reflux disease)  Liver disease     hep-c    Radiation therapy complication 0032    left breast     Past Surgical History:   Procedure Laterality Date    BIOPSY LIVER      BREAST SURGERY PROCEDURE UNLISTED  lymph node removed 4/8/15    HX APPENDECTOMY  12/30/2014    Laparoscopic Appendectomy    HX BLADDER SUSPENSION      HX BREAST BIOPSY Left 2015    HX BREAST LUMPECTOMY Left 4/8/15    with radiation     HX CYST INCISION AND DRAINAGE Left 4/8/2015    INCISION AND DRAINAGE BREAST performed by Beata Khan MD at \[facility]"" MAIN OR    HX GYN      hysterectomy    HX ORTHOPAEDIC      shoulder and heel spurs    HX OTHER SURGICAL      Teeth implants     Social History     Social History    Marital status:      Spouse name: N/A    Number of children: N/A    Years of education: N/A     Social History Main Topics    Smoking status: Former Smoker     Packs/day: 1.00     Years: 40.00     Types: Cigarettes     Quit date: 4/15/2015    Smokeless tobacco: Never Used      Comment: using vapor e-cigaretts    Alcohol use Yes      Comment: occasional    Drug use: No    Sexual activity: Not Asked     Other Topics Concern    None     Social History Narrative     Family History   Problem Relation Age of Onset    Diabetes Mother     Heart Disease Father     Hypertension Father     Diabetes Father      Current Outpatient Prescriptions   Medication Sig Dispense Refill    traZODone (DESYREL) 50 mg tablet Take 1-2 tabs every night for sleep. 60 Tab 11    fluticasone (FLONASE) 50 mcg/actuation nasal spray 2 Sprays by Both Nostrils route daily. 1 Bottle 11    raNITIdine (ZANTAC) 150 mg tablet 300 mg two (2) times a day.  cholecalciferol (VITAMIN D3) 1,000 unit tablet Take  by mouth daily.  diclofenac (VOLTAREN) 1 % gel Apply 4 g to affected area every six (6) hours. 100 g 11    levocetirizine (XYZAL) 5 mg tablet Take 1 Tab by mouth daily. 30 Tab 11    meloxicam (MOBIC) 7.5 mg tablet Take 1 Tab by mouth daily.  27 Tab 11    magnesium oxide (MAG-OX) 400 mg tablet Take 400 mg by mouth daily.  docusate sodium (COLACE) 100 mg capsule Take 1 Cap by mouth two (2) times a day. May use over-the counter equivalent instead. Take twice daily while on narcotic pain reliever to prevent constipation. 60 Cap 0    GLUCOSAMINE HCL/CHONDR PORTILLO A NA (OSTEO BI-FLEX PO) Take  by mouth.  fiber cap Take 2 Caps by mouth daily.  Omega-3-DHA-EPA-Fish Oil 1,000 (120-180) mg cap Take 1 Cap by mouth daily. Allergies   Allergen Reactions    Pcn [Penicillins] Anaphylaxis       Objective:  Visit Vitals    /82 (BP 1 Location: Right arm, BP Patient Position: Sitting)    Pulse 62    Temp 98.2 °F (36.8 °C) (Oral)    Resp 18    Ht 5' 6\" (1.676 m)    Wt 202 lb (91.6 kg)    SpO2 97%    BMI 32.6 kg/m2     Wt Readings from Last 3 Encounters:   03/20/18 202 lb (91.6 kg)   09/18/17 192 lb 3.2 oz (87.2 kg)   06/08/17 191 lb (86.6 kg)     Physical Exam:   General appearance - alert, well appearing, and in no distress. Mental status - A/O x 4, normal mood and affect. Head/Eyes- AT/NC. PHILLY, EOMI, corneas normal, no foreign bodies. Ears- TM injected bilaterally with air fluid level on left, no erythema or drainage. Nose- Septum midline, pink mucosa. Turbinates right boggy and pink, no polyps or erythema. No sinus tenderness. Mouth/Throat - mucous membranes moist, pharynx normal without lesions. No tonsillar swelling or exudates. Neck -Supple ,normal CSP. FROM, non-tender. No adenopathy/thyromegaly. No JVD. Chest - CTA. Symmetric chest rise. No wheezing, rales or rhonchi. Heart - Normal rate, regular rhythm. Normal S1, S2. No MGR. Abdomen - Soft,non-distended. Normoactive BS in all quadrants. NT, no mass, rebound, or HSM. No CVAT. Ext- Radial, DP pulses, 2+ bilaterally. No pedal edema, clubbing, or cyanosis. Skin- Normal for ethnicity, warm, and dry.  No hyperpigmentation, ulcerations, or suspicious lesions  Neuro - Normal speech, no focal findings. Normal strength and muscle tone. Coordination and gait normal.    CN II-XII intact. Cold and vibratory sensation intact. BL patellar DTR's, 0+. Assessment/Plan:  Labs ordered. LDCT ordered after discussing need for yearly imaging and indications for screening with < 30 pack-years with patient. Understanding of need for imaging and agreement to testing verbalized. Continued smoking cessation encouraged. Discussed the patient's BMI with her. The BMI follow up plan is as follows:     I have reviewed/discussed the above normal BMI (Body mass index is 32.6 kg/(m^2). ) with the patient. I have recommended the following interventions: encourage exercise, monitor weight, and dietary management education, guidance, and counseling, . Medication Side Effects and Warnings were discussed with patient: yes   Patient Labs were reviewed: yes  Patient Past Records were reviewed: yes  See orders below      ICD-10-CM ICD-9-CM    1. Adult general medical examination Z00.00 V70.9 CT LOW DOSE LUNG CANCER SCREENING   2. Screening for endocrine, nutritional, metabolic and immunity disorder C08.75 N57.50 METABOLIC PANEL, COMPREHENSIVE    Z13.21  CBC W/O DIFF    Z13.228      Z13.0     3. History of breast cancer Z85.3 V10.3 CT LOW DOSE LUNG CANCER SCREENING   4. Former smoker Z87.891 V15.82 CT LOW DOSE LUNG CANCER SCREENING   5. BMI 32.0-32.9,adult Z68.32 V85.32      Orders Placed This Encounter    CT LOW DOSE LUNG CANCER SCREENING     Standing Status:   Future     Standing Expiration Date:   3/21/2019     Order Specific Question:   Is this a low dose CT or a routine CT? Answer:   Low Dose CT [1]     Order Specific Question:   Reason for exam     Answer:   Lung Cancer Screening     Order Specific Question:   Smoking history; number of pack-years     Answer:   36     Order Specific Question:   The patient age is 50-69 years. **NOTE: If \"NO\" this study may not be covered by insurance.      Answer: Yes     Order Specific Question:   Does the patient show any signs or symptoms of lung cancer? Answer:   No     Order Specific Question:   Is this the first (baseline) CT or an annual exam?     Answer:   Baseline [1]     Order Specific Question:   Is there documentation of shared decision making? Answer:   Yes     Order Specific Question:   The patient was informed of the importance of adherence to annual screening, impact of comorbidities and ability/willingness to undergo diagnosis and treatment     Answer:   Yes     Order Specific Question:   The patient was informed of the importance of smoking cessation and/or maintaining smoking abstinence, including the offer of Medicare-covered tobacco cessation counseling services, if applicable     Answer:   Yes     Order Specific Question:   Smoking Status     Answer: Former Smoker [4]     Comments:   vapor     Order Specific Question:   Number of years since quit? Answer:   1    METABOLIC PANEL, COMPREHENSIVE    CBC W/O DIFF     Follow-up Disposition:  Return in about 6 months (around 9/20/2018) for BMI. Hany Biswas expressed understanding of plan. An After Visit Summary was offered/printed and given to the patient.

## 2018-03-20 NOTE — PROGRESS NOTES
1. Have you been to the ER, urgent care clinic since your last visit? Hospitalized since your last visit? Yes When: 2/2018 [facility]    2. Have you seen or consulted any other health care providers outside of the [address] [place] [address] since your last visit? Include any pap smears or colon screening.  No     Pt is here for   Chief Complaint   Patient presents with    Complete Physical     pt is here for a physical     Pt states pain level is a 4 back

## 2018-03-21 LAB
ALBUMIN SERPL-MCNC: 4.4 G/DL (ref 3.5–5.5)
ALBUMIN/GLOB SERPL: 1.5 {RATIO} (ref 1.2–2.2)
ALP SERPL-CCNC: 65 IU/L (ref 39–117)
ALT SERPL-CCNC: 23 IU/L (ref 0–32)
AST SERPL-CCNC: 23 IU/L (ref 0–40)
BILIRUB SERPL-MCNC: <0.2 MG/DL (ref 0–1.2)
BUN SERPL-MCNC: 18 MG/DL (ref 6–24)
BUN/CREAT SERPL: 24 (ref 9–23)
CALCIUM SERPL-MCNC: 9.8 MG/DL (ref 8.7–10.2)
CHLORIDE SERPL-SCNC: 102 MMOL/L (ref 96–106)
CO2 SERPL-SCNC: 25 MMOL/L (ref 18–29)
CREAT SERPL-MCNC: 0.74 MG/DL (ref 0.57–1)
ERYTHROCYTE [DISTWIDTH] IN BLOOD BY AUTOMATED COUNT: 13.7 % (ref 12.3–15.4)
GFR SERPLBLD CREATININE-BSD FMLA CKD-EPI: 103 ML/MIN/1.73
GFR SERPLBLD CREATININE-BSD FMLA CKD-EPI: 90 ML/MIN/1.73
GLOBULIN SER CALC-MCNC: 2.9 G/DL (ref 1.5–4.5)
GLUCOSE SERPL-MCNC: 95 MG/DL (ref 65–99)
HCT VFR BLD AUTO: 41.1 % (ref 34–46.6)
HGB BLD-MCNC: 13.5 G/DL (ref 11.1–15.9)
MCH RBC QN AUTO: 30.1 PG (ref 26.6–33)
MCHC RBC AUTO-ENTMCNC: 32.8 G/DL (ref 31.5–35.7)
MCV RBC AUTO: 92 FL (ref 79–97)
PLATELET # BLD AUTO: 306 X10E3/UL (ref 150–379)
POTASSIUM SERPL-SCNC: 4.8 MMOL/L (ref 3.5–5.2)
PROT SERPL-MCNC: 7.3 G/DL (ref 6–8.5)
RBC # BLD AUTO: 4.48 X10E6/UL (ref 3.77–5.28)
SODIUM SERPL-SCNC: 142 MMOL/L (ref 134–144)
WBC # BLD AUTO: 7.4 X10E3/UL (ref 3.4–10.8)

## 2018-05-07 ENCOUNTER — OFFICE VISIT (OUTPATIENT)
Dept: HEMATOLOGY | Age: 58
End: 2018-05-07

## 2018-05-07 VITALS
SYSTOLIC BLOOD PRESSURE: 129 MMHG | HEART RATE: 70 BPM | WEIGHT: 195.6 LBS | BODY MASS INDEX: 31.57 KG/M2 | DIASTOLIC BLOOD PRESSURE: 72 MMHG | TEMPERATURE: 97.2 F | OXYGEN SATURATION: 96 %

## 2018-05-07 DIAGNOSIS — Z86.19 HISTORY OF HEPATITIS C: ICD-10-CM

## 2018-05-07 DIAGNOSIS — B18.2 CHRONIC HEPATITIS C WITHOUT HEPATIC COMA (HCC): Primary | ICD-10-CM

## 2018-05-07 NOTE — PROGRESS NOTES
[address] [facility] MD Junito, KELSEY Marroquin PA-C Amy Rosevelt Melter, NP Marveen Bend, NP        at [address], [address] [place] Rebollar  22.     [phone]     FAX: [phone]    at [address], [address]     [phone]     FAX: [phone]       Patient Care Team:  Jossy Jauregui NP as PCP - General (Nurse Practitioner)  Luis E Carroll MD as Physician (Gastroenterology)  Aayush Martinez MD as Surgeon (General Surgery)  Manuel Iqbal MD as Physician (Hematology and Oncology)  Hanna Nassar NP as Nurse Practitioner (Nurse Practitioner)      Problem List  Date Reviewed: 2/18/2016          Codes Class Noted    BMI 32.0-32.9,adult ICD-10-CM: U96.53  ICD-9-CM: V85.32  3/20/2018        Neuropathy ICD-10-CM: G62.9  ICD-9-CM: 355.9  9/18/2017        History of breast cancer ICD-10-CM: Z85.3  ICD-9-CM: V10.3  9/18/2017        Insomnia ICD-10-CM: G47.00  ICD-9-CM: 780.52  6/8/2017        History of hepatitis C ICD-10-CM: Z86.19  ICD-9-CM: V12.09  6/8/2017        Chronic back pain ICD-10-CM: M54.9, G89.29  ICD-9-CM: 724.5, 338.29  4/18/2014              Cecy Melendez presents to the office for follow-up of chronic hepatitis C therapy and to perform fibroscan. The active problem list, all pertinent past medical history, medications, liver histology, radiologic findings and laboratory findings related to the liver disorder were reviewed with the patient. She has completed a 12 week course of Afua Dub in 2016 and has been shown to have achieved a sustained viral response to therapy. She has not been seen in this office in over a year and she has since completed radiation therapy treatments that she is receiving for recently diagnosed breast cancer.   She was started on Femara but was having hot flashes and irritability, this had been changed to Arimidex as well as some other in-class medications, but she did not tolerate these meds well and discontinued. She is maintaining close follow-up with her radiation oncology team and has had recent good lab work. She has no new physical complaints and presents today to monitor possible changes in testure seen on recent US of the liver. Patient has been following with Dr Syl Sutton for assessment of nausea. She had EGD done in 1/2018 and was found to have candida infection. She has since been treated for this and is now on ranitidine. During the course of her work-up, Dr Syl Sutton had ordered a RUQ US and this had suggested increased echogenic changes of the liver. The patient completes all daily activities without any functional limitations or symptoms of her fatigue. The patient has not experienced problems concentrating, swelling of the abdomen, swelling of the lower extremities, hematemesis, or hematochezia. ALLERGIES  Allergies   Allergen Reactions    Pcn [Penicillins] Anaphylaxis       MEDICATIONS  Current Outpatient Prescriptions   Medication Sig    riboflavin, vitamin B2, (VITAMIN B-2 PO) Take  by mouth.  B infantis/B ani/B sami/B bifid (PROBIOTIC 4X PO) Take  by mouth.  traZODone (DESYREL) 50 mg tablet Take 1-2 tabs every night for sleep.  fluticasone (FLONASE) 50 mcg/actuation nasal spray 2 Sprays by Both Nostrils route daily.  raNITIdine (ZANTAC) 150 mg tablet 300 mg two (2) times a day.  cholecalciferol (VITAMIN D3) 1,000 unit tablet Take  by mouth daily.  diclofenac (VOLTAREN) 1 % gel Apply 4 g to affected area every six (6) hours.  levocetirizine (XYZAL) 5 mg tablet Take 1 Tab by mouth daily.  meloxicam (MOBIC) 7.5 mg tablet Take 1 Tab by mouth daily.  magnesium oxide (MAG-OX) 400 mg tablet Take 400 mg by mouth daily.  docusate sodium (COLACE) 100 mg capsule Take 1 Cap by mouth two (2) times a day.  May use over-the counter equivalent instead. Take twice daily while on narcotic pain reliever to prevent constipation.  GLUCOSAMINE HCL/CHONDR PORTILLO A NA (OSTEO BI-FLEX PO) Take  by mouth.  fiber cap Take 2 Caps by mouth daily.  Omega-3-DHA-EPA-Fish Oil 1,000 (120-180) mg cap Take 1 Cap by mouth daily. No current facility-administered medications for this visit. SYSTEM REVIEW NOT RELATED TO LIVER DISEASE OR REVIEWED ABOVE:  Constitution systems: Negative for fever, chills, weight gain, weight loss. Eyes: Negative for visual changes. ENT: Negative for sore throat, painful swallowing. Respiratory: Negative for cough, hemoptysis, SOB. Cardiology: Negative for chest pain, palpitations. GI:  Negative for constipation or diarrhea. : Negative for urinary frequency, dysuria, hematuria, nocturia. Skin: Negative for rash. Surgical incisions healing well. Redness and tenderness of skin with radiation. Hematology: easy bruising at the time of her breast surgery, negative for history blood clots. Musculo-skeletal: Negative for back pain, muscle pain, weakness. Neurologic: Negative for headaches, dizziness, vertigo, memory problems not related to HE. Psychology: Negative for anxiety, depression. FAMILY HISTORY:  The father  of heart disease. The mother  of ESRD. A brother  in  from complications of cirrhosis secondary to hepatitis C. A sister has lung cancer and HCV and is due to start HCV treatment soon. SOCIAL HISTORY:  The patient is . The patient has 2 children, and 2 grandchildren. The patient currently smokes 1 pack of tobacco daily. The patient consumes 8 alcoholic beverages per week. The patient retired in from [employer] in . 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       PHYSICAL EXAMINATION:  Visit Vitals    /72 (BP 1 Location: Left arm, BP Patient Position: Sitting)    Pulse 70    Temp 97.2 °F (36.2 °C) (Tympanic)    Wt 195 lb 9.6 oz (88.7 kg)    SpO2 96%    BMI 31.57 kg/m2     General: No acute distress. Eyes: Sclera anicteric. ENT: No oral lesions. Thyroid normal.  Nodes: No adenopathy. Skin: No spider angiomata. No jaundice. No palmar erythema. Respiratory: Lungs clear to auscultation. Cardiovascular: Regular heart rate. No murmurs. No JVD. Abdomen: Soft non-tender. Liver size normal to percussion/palpation. Spleen not palpable. No obvious ascites. Extremities: No edema. No muscle wasting. No gross arthritic changes. Neurologic: Alert and oriented. Cranial nerves grossly intact. No asterixis.     LABORATORY STUDIES:  [address] & Units 3/20/2018 9/18/2017   WBC 3.4 - 10.8 x10E3/uL 7.4 7.2   ANC 1.4 - 7.0 x10E3/uL  2.7   HGB 11.1 - 15.9 g/dL 13.5 12.8    - 379 x10E3/uL 306 249   INR 0.8 - 1.2     AST 0 - 40 IU/L 23 21   ALT 0 - 32 IU/L 23 19   Alk Phos 39 - 117 IU/L 65 63   Bili, Total 0.0 - 1.2 mg/dL <0.2 <0.2   Bili, Direct 0.00 - 0.40 mg/dL     Albumin 3.5 - 5.5 g/dL 4.4 4.4   BUN 6 - 24 mg/dL 18 21   Creat 0.57 - 1.00 mg/dL 0.74 0.70   Na 134 - 144 mmol/L 142 140   K 3.5 - 5.2 mmol/L 4.8 4.5   Cl 96 - 106 mmol/L 102 99   CO2 18 - 29 mmol/L 25 26   Glucose 65 - 99 mg/dL 95 91   Magnesium 1.6 - 2.4 mg/dL       Liver [place] [address] Ref Rng & Units 11/14/2016   WBC 3.4 - 10.8 x10E3/uL 13.2 (H)   ANC 1.4 - 7.0 x10E3/uL 11.9 (H)   HGB 11.1 - 15.9 g/dL 14.8    - 379 x10E3/uL 266   INR 0.8 - 1.2    AST 0 - 40 IU/L 25   ALT 0 - 32 IU/L 33   Alk Phos 39 - 117 IU/L 74   Bili, Total 0.0 - 1.2 mg/dL 0.4   Bili, Direct 0.00 - 0.40 mg/dL    Albumin 3.5 - 5.5 g/dL 4.0   BUN 6 - 24 mg/dL 23 (H)   Creat 0.57 - 1.00 mg/dL 0.93   Na 134 - 144 mmol/L 140   K 3.5 - 5.2 mmol/L 3.8   Cl 96 - 106 mmol/L 106   CO2 18 - 29 mmol/L 26   Glucose 65 - 99 mg/dL 107 (H)   Magnesium 1.6 - 2.4 mg/dL 2.1     Virology Latest Ref Rng & Units 2/18/2016 8/17/2015   HCV RNA, FABY, QL Negative Negative Negative   Additional lab values drawn at today's office visit are pending at the time of documentation. 3/2014. HCV Genotype 1A, HCV RNA Log 5.9 IU/ml  2/2015. Baseline HCV.  740,830 international units /mL  3/2015. Wk 3 HCV.  +, <15 international units /mL  4/2015. Wk 8 HCV. Undetected. 6/2015 4 wk post-treatment. Undetected. 8/2015. HCV RNA 3 mo post-tx. Undetected. 2/2016. HCV RNA 9 months post-tx. Pending at the time of documentation. SEROLOGIES:  Serologies Latest Ref Rng 4/18/2014   Hep A Ab, Total Negative Positive (A)   Hep B Surface Ag Negative Negative   Hep B Core Ab, Total Negative Negative   4/2014. Anti-HBsurface positive. LIVER HISTOLOGY:  6/2014. Slides reviewed by MLS. Moderate hepatitis with portal/septal fibrosis. 5/2018. FibroScan performed at [address]. EkPa was 6.1. Suggested fibrosis level is F0-1. CAP score is 191, this is not suggestive of steatosis. ENDOSCOPIC PROCEDURES:  2/2011. Colonoscopy. Reported to be normal.  7/2015. Colonoscopy by Dr. Meggan Riggins. Reported to be normal.  7/2015. EGD by Dr. Meggan Riggins. Reportedly small healing ulcers, no H. Pylori. 1/2018. EGD performed by Dr Meggan Riggins. Visualize yeast, treated. RADIOLOGY:  7/2014. Ultrasound of liver. Normal appearing liver. No liver mass lesions. 8/2015. CT of abdomen. Normal appearing liver. 1/2018. Ultrasound of liver. Echogenic consistent with chronic liver disease/fatty liver. No liver mass lesions. No dilated bile ducts. No ascites. OTHER TESTING:  Not available or performed    ASSESSMENT AND PLAN:  Chronic hepatitis C, genotype 1a, in the setting of portal fibrosis. Tiana Fried tolerated medication for treatment of HCV well and is now >18 months post-treatment.    There has been no evidence of recurrent infection and liver synthetic function has remained normal.  I have reviewed with her that she will continue to test positive for HCV Ab, but that this is not a protective antibody and that she could contract HCV again if exposed. We also discussed the importance of maintaining healthy weight and avoid fatty liver or alcohol injury in the future. I have recommended that she continue follow-up with her PCP for routine monitoring and if she has future elevation of liver enzymes, we would be happy to see her back in follow-up. She otherwise will be discharged from the practice at this point. She is in agreement with this plan. Steatosis and increased echogenic changes suggested on recent ultrasound. There is no evidence of increased fibrosis or steatosis on today's fibroscan. This information was shared with the patient. Recent breast cancer and treatment. She has done very well with her surgical excision and completed radiation. She is intolerant to hormonal therapy and is following with oncology on a regular basis. The patient was directed to continue all current medications at the current dosages. There are no contraindications for the patient to take any medications that are necessary for treatment of other medical issues. Vaccination for viral hepatitis A and B is not needed. The patient has serologic evidence of prior exposure or vaccination with immunity. All of the above issues were discussed with the patient. All questions were answered. The patient expressed a clear understanding of the above. Follow-up Orlin Michael Parker 32 prn only. She will continue to follow with Dr Rhonda Bliss for periodic monitoring.      Caroline Blake PA-C  [facility] [place] [address], [address] [place]  22.  [phone]

## 2018-05-07 NOTE — PROGRESS NOTES
1. Have you been to the ER, urgent care clinic since your last visit? Hospitalized since your last visit? No    2. Have you seen or consulted any other health care providers outside of the [address] [place] [address] since your last visit? Include any pap smears or colon screening. No   Chief Complaint   Patient presents with    Follow-up     Visit Vitals    /72 (BP 1 Location: Left arm, BP Patient Position: Sitting)    Pulse 70    Temp 97.2 °F (36.2 °C) (Tympanic)    Wt 195 lb 9.6 oz (88.7 kg)    SpO2 96%    BMI 31.57 kg/m2     PHQ over the last two weeks 6/8/2017   Little interest or pleasure in doing things Not at all   Feeling down, depressed or hopeless Not at all   Total Score PHQ 2 0     Learning Assessment 5/7/2018   PRIMARY LEARNER Patient   HIGHEST LEVEL OF EDUCATION - PRIMARY LEARNER  -   BARRIERS PRIMARY LEARNER NONE   CO-LEARNER CAREGIVER No   CO-LEARNER NAME -   Juany Calero [address] -   LEARNER PREFERENCE PRIMARY LISTENING     -   LEARNER [address] -   ANSWERED BY patient    RELATIONSHIP SELF     Abuse Screening Questionnaire 5/7/2018   Do you ever feel afraid of your partner? N   Are you in a relationship with someone who physically or mentally threatens you? N   Is it safe for you to go home?  Davida Dance

## 2018-05-07 NOTE — MR AVS SNAPSHOT
[address] [facility] [phone] [address] [place] [address] 
[phone] Patient: Kiki Thomson MRN: [id number] TXW:[id number] Visit Information Date & Time Provider Department Dept. Phone Encounter #  
 5/7/2018 10:00 AM Madeline Mireles, [address] [address] [id number] Your Appointments 9/20/2018 10:00 AM  
ROUTINE CARE with Herb Lassiter NP  
[facility] Appt Note: bmi  
 [address] [facility] [address] [address]  
[phone]  
  
   
 [address] [facility] Upcoming Health Maintenance Date Due Influenza Age 5 to Adult 8/1/2018 PAP AKA CERVICAL CYTOLOGY 9/18/2018 FOBT Q 1 YEAR AGE 50-75 9/22/2018 Pneumococcal 19-64 Highest Risk (2 of 3 - PCV13) 3/20/2019 BREAST CANCER SCRN MAMMOGRAM 12/13/2019 DTaP/Tdap/Td series (2 - Td) 1/1/2024 Allergies as of 5/7/2018  Review Complete On: 5/7/2018 By: Anne Parker Severity Noted Reaction Type Reactions Pcn [Penicillins] High 04/08/2013    Anaphylaxis Current Immunizations  Never Reviewed Name Date Influenza Vaccine 11/1/2014 Not reviewed this visit Vitals BP Pulse Temp Weight(growth percentile) SpO2 BMI  
 129/72 (BP 1 Location: Left arm, BP Patient Position: Sitting) 70 97.2 °F (36.2 °C) (Tympanic) 195 lb 9.6 oz (88.7 kg) 96% 31.57 kg/m2 OB Status Smoking Status Postmenopausal Former Smoker BMI and BSA Data Body Mass Index Body Surface Area  
 31.57 kg/m 2 2.03 m 2 Preferred Pharmacy Pharmacy Name Phone [facility] [address] [place] [address] [phone] Your Updated Medication List  
  
   
This list is accurate as of 5/7/18 10:28 AM.  Always use your most recent med list.  
  
  
  
  
 diclofenac 1 % Gel Commonly known as:  VOLTAREN  
 Apply 4 g to affected area every six (6) hours. docusate sodium 100 mg capsule Commonly known as:  Jessica Parsley Take 1 Cap by mouth two (2) times a day. May use over-the counter equivalent instead. Take twice daily while on narcotic pain reliever to prevent constipation. fiber Cap Take 2 Caps by mouth daily. fluticasone 50 mcg/actuation nasal spray Commonly known as:  Christine Courser 2 Sprays by Both Nostrils route daily. levocetirizine 5 mg tablet Commonly known as:  Luiza Sayres Take 1 Tab by mouth daily. magnesium oxide 400 mg tablet Commonly known as:  MAG-OX Take 400 mg by mouth daily. meloxicam 7.5 mg tablet Commonly known as:  MOBIC Take 1 Tab by mouth daily. omega 3-DHA-EPA-fish oil 1,000 mg (120 mg-180 mg) capsule Take 1 Cap by mouth daily. OSTEO BI-FLEX PO Take  by mouth. PROBIOTIC 4X PO Take  by mouth. raNITIdine 150 mg tablet Commonly known as:  ZANTAC  
300 mg two (2) times a day. traZODone 50 mg tablet Commonly known as:  Letty Deeds Take 1-2 tabs every night for sleep. VITAMIN B-2 PO Take  by mouth. VITAMIN D3 1,000 unit tablet Generic drug:  cholecalciferol Take  by mouth daily. Introducing [facility]! Dear Booker Mcnair: Thank you for requesting a [employer] account. Our records indicate that you already have an active [employer] account. You can access your account anytime at [email or web address] Did you know that you can access your hospital and ER discharge instructions at any time in [employer]? You can also review all of your test results from your hospital stay or ER visit. Additional Information If you have questions, please visit the Frequently Asked Questions section of the [employer] website at [email or web address] Remember, [employer] is NOT to be used for urgent needs. For medical emergencies, dial 911. Now available from your iPhone and Android! Please provide this summary of care documentation to your next provider. Your primary care clinician is listed as DEREK I Anisa Lopez. If you have any questions after today's visit, please call [phone].

## 2018-09-17 DIAGNOSIS — J30.89 NON-SEASONAL ALLERGIC RHINITIS: ICD-10-CM

## 2018-09-18 RX ORDER — FLUTICASONE PROPIONATE 50 MCG
SPRAY, SUSPENSION (ML) NASAL
Qty: 1 BOTTLE | Refills: 1 | Status: SHIPPED | OUTPATIENT
Start: 2018-09-18 | End: 2019-11-30 | Stop reason: SDUPTHER

## 2018-09-20 ENCOUNTER — OFFICE VISIT (OUTPATIENT)
Dept: INTERNAL MEDICINE CLINIC | Age: 58
End: 2018-09-20

## 2018-09-20 VITALS
DIASTOLIC BLOOD PRESSURE: 63 MMHG | HEIGHT: 66 IN | HEART RATE: 73 BPM | RESPIRATION RATE: 18 BRPM | BODY MASS INDEX: 31.74 KG/M2 | SYSTOLIC BLOOD PRESSURE: 118 MMHG | TEMPERATURE: 97.4 F | OXYGEN SATURATION: 95 % | WEIGHT: 197.5 LBS

## 2018-09-20 DIAGNOSIS — Z87.891 FORMER SMOKER: ICD-10-CM

## 2018-09-20 DIAGNOSIS — G89.29 CHRONIC BACK PAIN, UNSPECIFIED BACK LOCATION, UNSPECIFIED BACK PAIN LATERALITY: ICD-10-CM

## 2018-09-20 DIAGNOSIS — M54.9 CHRONIC BACK PAIN, UNSPECIFIED BACK LOCATION, UNSPECIFIED BACK PAIN LATERALITY: ICD-10-CM

## 2018-09-20 DIAGNOSIS — Z71.3 WEIGHT LOSS COUNSELING, ENCOUNTER FOR: ICD-10-CM

## 2018-09-20 DIAGNOSIS — M17.0 PRIMARY OSTEOARTHRITIS OF BOTH KNEES: ICD-10-CM

## 2018-09-20 DIAGNOSIS — M25.461 SWELLING OF RIGHT KNEE JOINT: Primary | ICD-10-CM

## 2018-09-20 RX ORDER — HYDROCODONE BITARTRATE AND ACETAMINOPHEN 7.5; 325 MG/1; MG/1
TABLET ORAL
Refills: 0 | COMMUNITY
Start: 2018-09-17 | End: 2020-07-16 | Stop reason: ALTCHOICE

## 2018-09-20 RX ORDER — OLOPATADINE HYDROCHLORIDE 7 MG/ML
SOLUTION OPHTHALMIC
Refills: 2 | COMMUNITY
Start: 2018-09-18

## 2018-09-20 RX ORDER — OXYCODONE AND ACETAMINOPHEN 5; 325 MG/1; MG/1
TABLET ORAL
Refills: 0 | COMMUNITY
Start: 2018-08-20 | End: 2020-07-16 | Stop reason: ALTCHOICE

## 2018-09-20 NOTE — MR AVS SNAPSHOT
Sylvester Sidra 
 
 
 [address] [facility] [address] [address] 
[phone] Patient: Caprice Murry MRN: [id number] Visit Information Date & Time Provider Department Dept. Phone Encounter #  
 9/20/2018 10:00 AM Lillian Gutierrez NP [address] [facility] [phone] [id number] Follow-up Instructions Return in about 6 months (around 3/20/2019) for BMI, annual with labs. Upcoming Health Maintenance Date Due  
 PAP AKA CERVICAL CYTOLOGY 9/18/2018 FOBT Q 1 YEAR AGE 50-75 9/22/2018 Pneumococcal 19-64 Highest Risk (2 of 3 - PCV13) 3/20/2019 BREAST CANCER SCRN MAMMOGRAM 12/13/2019 DTaP/Tdap/Td series (2 - Td) 1/1/2024 Allergies as of 9/20/2018  Review Complete On: 9/20/2018 By: George Horton. CHATO Duran Severity Noted Reaction Type Reactions Pcn [Penicillins] High 04/08/2013    Anaphylaxis Current Immunizations  Reviewed on 9/20/2018 Name Date Influenza Vaccine 9/18/2018, 11/1/2014 Reviewed by George Horton. CHATO Duran on 2/04/6469 at 84:01 AM  
 Reviewed by George Horton. CHATO Duran on 7/71/3982 at 79:50 AM  
You Were Diagnosed With   
  
 Codes Comments Acute pain of right knee    -  Primary ICD-10-CM: M25.561 ICD-9-CM: 719.46 Former smoker     ICD-10-CM: Z55.565 ICD-9-CM: V15.82 BMI 31.0-31.9,adult     ICD-10-CM: Z68.31 
ICD-9-CM: V85.31 Vitals BP Pulse Temp Resp Height(growth percentile) Weight(growth percentile)  
 118/63 (BP 1 Location: Left arm, BP Patient Position: Sitting) 73 97.4 °F (36.3 °C) (Oral) 18 5' 6\" (1.676 m) 197 lb 8 oz (89.6 kg) SpO2 BMI OB Status Smoking Status 95% 31.88 kg/m2 Postmenopausal Former Smoker Vitals History BMI and BSA Data Body Mass Index Body Surface Area  
 31.88 kg/m 2 2.04 m 2 Preferred Pharmacy Pharmacy Name Phone [facility] Mendez Alcantara, [address] [place] [address] [address] [place] [address] [phone] Your Updated Medication List  
  
   
This list is accurate as of 9/20/18 11:04 AM.  Always use your most recent med list.  
  
  
  
  
 diclofenac 1 % Gel Commonly known as:  VOLTAREN Apply 4 g to affected area every six (6) hours. docusate sodium 100 mg capsule Commonly known as:  Ltanya Olman Take 1 Cap by mouth two (2) times a day. May use over-the counter equivalent instead. Take twice daily while on narcotic pain reliever to prevent constipation. fiber Cap Take 2 Caps by mouth daily. fluticasone 50 mcg/actuation nasal spray Commonly known as:  FLONASE  
PUT 2 SPRAYS IN BOUTH NOSTRILS EVERY DAY  
  
 HYDROcodone-acetaminophen 7.5-325 mg per tablet Commonly known as:  NORCO  
TAKE 1 TABLET BY MOUTH THREE TIMES A DAY AS NEEDED FOR PAIN  
  
 levocetirizine 5 mg tablet Commonly known as:  Tri Grumbles Take 1 Tab by mouth daily. magnesium oxide 400 mg tablet Commonly known as:  MAG-OX Take 400 mg by mouth daily. meloxicam 7.5 mg tablet Commonly known as:  MOBIC Take 1 Tab by mouth daily. omega 3-DHA-EPA-fish oil 1,000 mg (120 mg-180 mg) capsule Take 1 Cap by mouth daily. OSTEO BI-FLEX PO Take  by mouth. oxyCODONE-acetaminophen 5-325 mg per tablet Commonly known as:  PERCOCET TAKE 1 TO 2 TABLET BY MOUTH EVERY 4 TO 6 HOURS AS NEEDED FOR PAIN  
  
 PAZEO 0.7 % Drop Generic drug:  olopatadine PLACE 1 DROP IN BOTH EYES DAILY PROBIOTIC 4X PO Take  by mouth. raNITIdine 150 mg tablet Commonly known as:  ZANTAC  
300 mg two (2) times a day. traZODone 50 mg tablet Commonly known as:  Ramakrishna Lipps Take 1-2 tabs every night for sleep. VITAMIN B-2 PO Take  by mouth. VITAMIN D3 1,000 unit tablet Generic drug:  cholecalciferol Take  by mouth daily. Follow-up Instructions Return in about 6 months (around 3/20/2019) for BMI, annual with labs.   
  
To-Do List   
 12/21/2018 9:00 AM  
 Appointment with [facility] ANGELA 2 at [address] [place] [address] ([phone]) Shower or bathe using soap and water. Do not use deodorant, powder, perfumes, or lotion the day of your exam.  If your prior mammograms were not performed at [facility] please bring films with you or forward prior images 2 days before your procedure. Not doing so may result in your appointment needing to be rescheduled. If patient is not a callback diagnostic, the patient must have an order/script from the physician for the diagnostic. Please bring it on the day of the mammogram or have it faxed to the department. [facility]  000-1998 [facility] 19 EVELIO  [phone] [facility] [phone] [facility] [address] [facility] [phone] In[place] Barreto [phone] Patient Instructions Aim to eat 3 meals and 1 snack. Try eating every 4 hours also. Healthy food choices. Healthy snacks: 
Fruit- 1/2 cup per serving Vegetables-1 cup per serving Sugar-Free or Low-Carb branded snacks Lean protein- chicken, turkey, fish, deer, organic-fat free beef (in moderation) Jerky-beef, chicken, or turkey Oatmeal 
 
Drink mostly water, aiming for 1 gallon a day, but at least 10 glasses/day. May add lemon, lime, cucumber, or citrus fruit to water to help with digestion. Joint Aspiration: Care Instructions Your Care Instructions During a joint aspiration, a doctor uses a needle to take fluid out of your joint. This might be done to test the fluid for infection or to find a cause for a joint problem. These problems may include bleeding, infection, gout, or pseudogout. Sometimes fluid is taken out to relieve pressure and pain from too much fluid in the joint. The area where the needle is inserted may be numbed before the needle is put in. Then the needle is slowly put into the joint. A syringe attached to the needle is used to remove fluid.  The fluid may be put in tubes or containers and sent to the lab. Sometimes a shot of steroid medicine is also given into the joint. This can help relieve inflammation and pain. It can also help prevent the fluid from building up again. Follow-up care is a key part of your treatment and safety. Be sure to make and go to all appointments, and call your doctor if you are having problems. It's also a good idea to know your test results and keep a list of the medicines you take. How can you care for yourself at home? · If you have bandages over the area, keep them clean and dry. You may remove them when your doctor tells you to. · Put ice or a cold pack on the area for 10 to 20 minutes at a time. Put a thin cloth between the ice and your skin. · Ask your doctor if you can take an over-the-counter pain medicine, such as acetaminophen (Tylenol), ibuprofen (Advil, Motrin), or naproxen (Aleve). Be safe with medicines. Read and follow all instructions on the label. · Avoid strenuous activities for several days, especially those that put stress on the area where the needle was put in. When should you call for help? Call your doctor now or seek immediate medical care if: 
  · You have symptoms of infection, such as: 
¨ Increased pain, swelling, warmth, or redness around the area. ¨ Red streaks leading from the area. ¨ Pus draining from the area. ¨ A fever.  
 Watch closely for changes in your health, and be sure to contact your doctor if you have any problems. Where can you learn more? Go to http://amos-chong.info/. Enter A013 in the search box to learn more about \"Joint Aspiration: Care Instructions. \" Current as of: October 4, 2017 Content Version: 11.7 © 9839-6146 Copyright Agent. Care instructions adapted under license by [employer] (which disclaims liability or warranty for this information).  If you have questions about a medical condition or this instruction, always ask your healthcare professional. [address] any warranty or liability for your use of this information. Introducing [facility]! Dear Shobhastephane Manzano: Thank you for requesting a Visiarc account. Our records indicate that you already have an active Visiarc account. You can access your account anytime at [email or web address] Did you know that you can access your hospital and ER discharge instructions at any time in Visiarc? You can also review all of your test results from your hospital stay or ER visit. Additional Information If you have questions, please visit the Frequently Asked Questions section of the Visiarc website at [email or web address] Remember, Visiarc is NOT to be used for urgent needs. For medical emergencies, dial 911. Now available from your iPhone and Android! Please provide this summary of care documentation to your next provider. Your primary care clinician is listed as DEREK Ge. If you have any questions after today's visit, please call [phone].

## 2018-09-20 NOTE — PROGRESS NOTES
Pt is here for Chief Complaint Patient presents with  Follow-up BMI  Knee Pain  
  with swelling, right Pt states pain level is a 4 right knee 1. Have you been to the ER, urgent care clinic since your last visit? Hospitalized since your last visit? No 
 
2. Have you seen or consulted any other health care providers outside of the [address] [place] [address] since your last visit? Include any pap smears or colon screening.  No

## 2018-09-20 NOTE — PROGRESS NOTES
Dwaine Mireles is a 61 y.o. female and presents with Follow-up (BMI) and Knee Pain (with swelling, right ) Subjective: 
Pt here to f/u wt, but unable to continue gym classes due to surgery, has had weight gain since. Had bladder surgery 8/20, will f/u soon. Has NOT been to gym since. Also stopped smoking since last OV. Started Vaping without nicotine, but broke Saturday. Using straws and gum. Knee pain Review: 
Patient complains of right knee swelling. Onset of the symptoms was hours  ago. Inciting event: none, no fall or injury. Current symptoms include LROM. Aggravating symptoms: going up and down stairs, walking, lateral movements, any weight bearing. Patient's overall course: stable. Patient has had prior knee problems. Evaluation to date: X-ray. Treatment to date:NSAIDS/Tylenol/Aspirin. Aching pain currently, Pain Scale: 4/10. Also has chronic lower back pain, will have nerve injection 10/8 with Dr. Hanna Ellis for L1-5. Review of Systems Constitutional: negative for fevers, chills, anorexia and weight loss Respiratory:  negative for cough, hemoptysis, dyspnea, and wheezing CV:   negative for chest pain, palpitations, and lower extremity edema GI:   negative for nausea, vomiting, diarrhea, abdominal pain, and melena Endo:               negative for polyuria,polydipsia,polyphagia, and heat intolerance Genitourinary: negative for frequency, urgency, dysuria, retention, and hematuria Integument:  negative for rash, ulcerations, and pruritus Hematologic:  negative for easy bruising and bleeding Musculoskel: negative for arthralgias, muscle weakness,and joint pain/swelling Neurological:  negative for headaches, dizziness, vertigo,and memory/gait problems Behavl/Psych: negative for feelings of anxiety, depression, suicide, and mood changes Past Medical History:  
Diagnosis Date  Arthritis  Cancer ([facility] [address] skin - leg  Chronic pain  GERD (gastroesophageal reflux disease)  Liver disease   
 hep-c  Radiation therapy complication 0894  
 left breast  
 
Past Surgical History:  
Procedure Laterality Date  BIOPSY LIVER    
 BREAST SURGERY PROCEDURE UNLISTED  lymph node removed 4/8/15  HX APPENDECTOMY  12/30/2014 Laparoscopic Appendectomy  HX BLADDER SUSPENSION    
 HX BREAST BIOPSY Left 2015  HX BREAST LUMPECTOMY Left 4/8/15  
 with radiation  HX CYST INCISION AND DRAINAGE Left 4/8/2015 INCISION AND DRAINAGE BREAST performed by Deyanira Kerr [facility] at [facility] MAIN OR  
 HX GYN    
 hysterectomy  HX ORTHOPAEDIC    
 shoulder and heel spurs  HX OTHER SURGICAL Teeth implants Social History Social History  Marital status:  Spouse name: N/A  
 Number of children: N/A  
 Years of education: N/A Social History Main Topics  Smoking status: Former Smoker Packs/day: 1.00 Years: 40.00 Types: Cigarettes Quit date: 4/15/2015  Smokeless tobacco: Never Used Comment: using vapor e-cigaretts  Alcohol use Yes Comment: occasional  
 Drug use: No  
 Sexual activity: Not Asked Other Topics Concern  None Social History Narrative Family History Problem Relation Age of Onset  Diabetes Mother  Heart Disease Father  Hypertension Father  Diabetes Father Current Outpatient Prescriptions Medication Sig Dispense Refill  
 HYDROcodone-acetaminophen (NORCO) 7.5-325 mg per tablet TAKE 1 TABLET BY MOUTH THREE TIMES A DAY AS NEEDED FOR PAIN  0  
 PAZEO 0.7 % drop PLACE 1 DROP IN BOTH EYES DAILY  2  
 fluticasone (FLONASE) 50 mcg/actuation nasal spray PUT 2 SPRAYS IN BOUTH NOSTRILS EVERY DAY 1 Bottle 1  riboflavin, vitamin B2, (VITAMIN B-2 PO) Take  by mouth.  B infantis/B ani/B sami/B bifid (PROBIOTIC 4X PO) Take  by mouth.  traZODone (DESYREL) 50 mg tablet Take 1-2 tabs every night for sleep.  60 Tab 11  
  raNITIdine (ZANTAC) 150 mg tablet 300 mg two (2) times a day.  cholecalciferol (VITAMIN D3) 1,000 unit tablet Take  by mouth daily.  diclofenac (VOLTAREN) 1 % gel Apply 4 g to affected area every six (6) hours. 100 g 11  
 levocetirizine (XYZAL) 5 mg tablet Take 1 Tab by mouth daily. 30 Tab 11  
 meloxicam (MOBIC) 7.5 mg tablet Take 1 Tab by mouth daily. 30 Tab 11  
 docusate sodium (COLACE) 100 mg capsule Take 1 Cap by mouth two (2) times a day. May use over-the counter equivalent instead. Take twice daily while on narcotic pain reliever to prevent constipation. 60 Cap 0  
 fiber cap Take 2 Caps by mouth daily.  Omega-3-DHA-EPA-Fish Oil 1,000 (120-180) mg cap Take 1 Cap by mouth daily.  oxyCODONE-acetaminophen (PERCOCET) 5-325 mg per tablet TAKE 1 TO 2 TABLET BY MOUTH EVERY 4 TO 6 HOURS AS NEEDED FOR PAIN  0  
 magnesium oxide (MAG-OX) 400 mg tablet Take 400 mg by mouth daily.  GLUCOSAMINE HCL/CHONDR PORTILLO A NA (OSTEO BI-FLEX PO) Take  by mouth. Allergies Allergen Reactions  Pcn [Penicillins] Anaphylaxis Objective: 
Visit Vitals  /63 (BP 1 Location: Left arm, BP Patient Position: Sitting)  Pulse 73  Temp 97.4 °F (36.3 °C) (Oral)  Resp 18  Ht 5' 6\" (1.676 m)  Wt 197 lb 8 oz (89.6 kg)  SpO2 95%  BMI 31.88 kg/m2 Wt Readings from Last 3 Encounters:  
09/20/18 197 lb 8 oz (89.6 kg) 05/07/18 195 lb 9.6 oz (88.7 kg) 03/20/18 202 lb (91.6 kg) Physical Exam:  
General appearance - alert, well appearing, and in no distress. Mental status - A/O x 4, normal mood and affect. Neck -Supple ,normal CSP. FROM, non-tender. No significant adenopathy/thyromegaly. No JVD. Chest - CTA. Symmetric chest rise. No wheezing, rales or rhonchi. Heart - Normal rate, regular rhythm. Normal S1, S2. No MGR or clicks. Abdomen - Soft,non-distended. Normoactive BS in all quadrants. NT, no mass or HSM. Ext- Radial, DP pulses, 2+ bilaterally. No pedal edema, clubbing, or cyanosis. Skin-Warm and dry. No hyperpigmentation, ulcerations, or suspicious lesions. Neuro - Normal speech, no focal findings or movement disorder. Normal strength, gait, and muscle tone. Right Knee- LROM. medial joint line tenderness. no quadriceps tendonitis. no patellar tendonitis. no femoral epicondyle tenderness. no tibial plateau tenderness. Varus deformity. No erythema. + small effusion over quadriceps tendon. +crepitus. no laxity. no Lachman's test.  
 
Assessment/Plan: 
RICE and use of NSAID (additional MOBIC for knee swelling reported). Discussed the patient's BMI with her. The BMI follow up plan is as follows: goal wt discussed to 185 lb. I have reviewed/discussed the above normal BMI (Body mass index is 31.88 kg/(m^2). ) with the patient. I have recommended the following interventions: encourage exercise, monitor weight, and dietary management education, guidance, and counseling, . Medication Side Effects and Warnings were discussed with patient: yes Patient Labs were reviewed: yes Patient Past Records were reviewed: yes See below for other orders Follow-up Disposition: 
Return in about 6 months (around 3/20/2019) for BMI, annual with labs. Pt has given consent verbally while in office for [facility] [employer]. ICD-10-CM ICD-9-CM 1. Swelling of right knee joint M25.461 719.06   
2. Former smoker Z87.891 V15.82   
3. BMI 31.0-31.9,adult Z68.31 V85.31   
4. Weight loss counseling, encounter for Z71.3 V65.3 5. Chronic back pain, unspecified back location, unspecified back pain laterality M54.9 724.5 G89.29 338.29 6. Primary osteoarthritis of both knees M17.0 715.16 Orders Placed This Encounter  HYDROcodone-acetaminophen (NORCO) 7.5-325 mg per tablet Sig: TAKE 1 TABLET BY MOUTH THREE TIMES A DAY AS NEEDED FOR PAIN Refill:  0  
 oxyCODONE-acetaminophen (PERCOCET) 5-325 mg per tablet Sig: TAKE 1 TO 2 TABLET BY MOUTH EVERY 4 TO 6 HOURS AS NEEDED FOR PAIN Refill:  0  
 PAZEO 0.7 % drop Sig: PLACE 1 DROP IN BOTH EYES DAILY Refill:  2 Gaby Garcia expressed understanding of plan. An After Visit Summary was offered/printed and given to the patient.

## 2018-10-04 RX ORDER — DICLOFENAC SODIUM 10 MG/G
GEL TOPICAL
Qty: 100 G | Refills: 3 | Status: SHIPPED | OUTPATIENT
Start: 2018-10-04

## 2018-12-06 RX ORDER — MELOXICAM 7.5 MG/1
7.5 TABLET ORAL DAILY
Qty: 30 TAB | Refills: 11 | Status: SHIPPED | OUTPATIENT
Start: 2018-12-06 | End: 2020-02-24

## 2018-12-21 ENCOUNTER — HOSPITAL ENCOUNTER (OUTPATIENT)
Dept: MAMMOGRAPHY | Age: 58
Discharge: HOME | End: 2018-12-21
Attending: INTERNAL MEDICINE
Payer: COMMERCIAL

## 2018-12-21 DIAGNOSIS — C50.412 MALIGNANT NEOPLASM OF UPPER-OUTER QUADRANT OF LEFT FEMALE BREAST (HCC): ICD-10-CM

## 2018-12-21 PROCEDURE — 77066 DX MAMMO INCL CAD BI: CPT

## 2019-03-21 ENCOUNTER — OFFICE VISIT (OUTPATIENT)
Dept: INTERNAL MEDICINE CLINIC | Age: 59
End: 2019-03-21

## 2019-03-21 VITALS
TEMPERATURE: 97.4 F | WEIGHT: 192 LBS | OXYGEN SATURATION: 97 % | RESPIRATION RATE: 17 BRPM | SYSTOLIC BLOOD PRESSURE: 109 MMHG | HEART RATE: 64 BPM | HEIGHT: 66 IN | DIASTOLIC BLOOD PRESSURE: 77 MMHG | BODY MASS INDEX: 30.86 KG/M2

## 2019-03-21 DIAGNOSIS — Z13.228 SCREENING FOR ENDOCRINE, NUTRITIONAL, METABOLIC AND IMMUNITY DISORDER: ICD-10-CM

## 2019-03-21 DIAGNOSIS — Z87.891 FORMER SMOKER: ICD-10-CM

## 2019-03-21 DIAGNOSIS — Z13.29 SCREENING FOR ENDOCRINE, NUTRITIONAL, METABOLIC AND IMMUNITY DISORDER: ICD-10-CM

## 2019-03-21 DIAGNOSIS — Z71.89 ADVANCE CARE PLANNING: ICD-10-CM

## 2019-03-21 DIAGNOSIS — Z13.220 SCREENING FOR LIPID DISORDERS: ICD-10-CM

## 2019-03-21 DIAGNOSIS — Z86.19 HISTORY OF HEPATITIS C: ICD-10-CM

## 2019-03-21 DIAGNOSIS — Z23 ENCOUNTER FOR IMMUNIZATION: ICD-10-CM

## 2019-03-21 DIAGNOSIS — Z13.0 SCREENING FOR ENDOCRINE, NUTRITIONAL, METABOLIC AND IMMUNITY DISORDER: ICD-10-CM

## 2019-03-21 DIAGNOSIS — Z00.00 ADULT GENERAL MEDICAL EXAMINATION: Primary | ICD-10-CM

## 2019-03-21 DIAGNOSIS — Z13.21 SCREENING FOR ENDOCRINE, NUTRITIONAL, METABOLIC AND IMMUNITY DISORDER: ICD-10-CM

## 2019-03-21 DIAGNOSIS — Z12.11 COLON CANCER SCREENING: ICD-10-CM

## 2019-03-21 NOTE — ACP (ADVANCE CARE PLANNING)
Advanced care planning- discussed Advance directive, Medical POA, and life sustaining options. Given/Mailing gideon ann paper work and contact info for Mignon Va facilitator/NN to complete paperwork in office. Advance Care Planning (ACP) Provider Conversation Snapshot    Date of ACP Conversation: 03/21/19  Persons included in Conversation:  Patient/family  Length of ACP Conversation in minutes:  5-10 minutes    Authorized Decision Maker (if patient is incapable of making informed decisions): This person is:   Healthcare Agent/Medical Power of  under Advance Directive            For Patients with Decision Making Capacity:   Values/Goals: Exploration of values, goals, and preferences if recovery is not expected, even with continued medical treatment in the event of:  Severe, permanent brain injury  \"In these circumstances, what matters most to you? \"  Care focused more on comfort or quality of life. Conversation Outcomes / Follow-Up Plan: Has one, just NOT on file here. Asked to bring in copy. Recommended completion of Advance Directive form after review of ACP materials and conversation with prospective healthcare agent   Referral made for ACP follow-up assistance to:  Gideon Ann Va facilitator/ Nurse navigator      ====Gideon Ann Invitation====    Patient was invited to [employer] on this date and given the information (folder) for review. Recommended appointment with Gideon Ann facilitator for ACP conversation regarding advance directives. [x] Yes  [] No  Referral sent to [facility] Rukhsana team member or Coordinator for follow-up    [] Yes  [x] No  Patient scheduled an appointment. Site of Referral (type it here):  [facility]

## 2019-03-21 NOTE — PATIENT INSTRUCTIONS
Learning About Mindfulness for Stress  What are mindfulness and stress? Stress is what you feel when you have to handle more than you are used to. A lot of things can cause stress. You may feel stress when you go on a job interview, take a test, or run a race. This kind of short-term stress is normal and even useful. It can help you if you need to work hard or react quickly. Stress also can last a long time. Long-term stress is caused by stressful situations or events. Examples of long-term stress include long-term health problems, ongoing problems at work, and conflicts in your family. Long-term stress can harm your health. Mindfulness is a focus only on things happening in the present moment. It's a process of purposefully paying attention to and being aware of your surroundings, your emotions, your thoughts, and how your body feels. You are aware of these things, but you aren't judging these experiences as \"good\" or \"bad. \" Mindfulness can help you learn to calm your mind and body to help you cope with illness, pain, and stress. How does mindfulness help to relieve stress? Mindfulness can help quiet your mind and relax your body. Studies show that it can help some people sleep better, feel less anxious, and bring their blood pressure down. And it's been shown to help some people live and cope better with certain health problems like heart disease, depression, chronic pain, and cancer. How do you practice mindfulness? To be mindful is to pay attention, to be present, and to be accepting. · When you're mindful, you do just one thing and you pay close attention to that one thing. For example, you may sit quietly and notice your emotions or how your food tastes and smells. · When you're present, you focus on the things that are happening right now. You let go of your thoughts about the past and the future. When you dwell on the past or the future, you miss moments that can heal and strengthen you.  You may miss moments like hearing a child laugh or seeing a friendly face when you think you're all alone. · When you're accepting, you don't  the present moment. Instead you accept your thoughts and feelings as they come. You can practice anytime, anywhere, and in any way you choose. You can practice in many ways. Here are a few ideas:  · While doing your chores, like washing the dishes, let your mind focus on what's in your hand. What does the dish feel like? Is the water warm or cold? · Go outside and take a few deep breaths. What is the air like? Is it warm or cold? · When you can, take some time at the start of your day to sit alone and think. · Take a slow walk by yourself. Count your steps while you breathe in and out. · Try yoga breathing exercises, stretches, and poses to strengthen and relax your muscles. · At work, if you can, try to stop for a few moments each hour. Note how your body feels. Let yourself regroup and let your mind settle before you return to what you were doing. · If you struggle with anxiety or \"worry thoughts,\" imagine your mind as a blue duarte and your worry thoughts as clouds. Now imagine those worry thoughts floating across your mind's duarte. Just let them pass by as you watch. Follow-up care is a key part of your treatment and safety. Be sure to make and go to all appointments, and call your doctor if you are having problems. It's also a good idea to know your test results and keep a list of the medicines you take. Where can you learn more? Go to http://amos-chong.info/. Enter B640 in the search box to learn more about \"Learning About Mindfulness for Stress. \"  Current as of: June 28, 2018  Content Version: 11.9  © 2168-8156 [employer]. Care instructions adapted under license by [employer] (which disclaims liability or warranty for this information).  If you have questions about a medical condition or this instruction, always ask your healthcare professional. [address] any warranty or liability for your use of this information. Learning About Progressive Muscle Relaxation for Stress  What are progressive muscle relaxation and stress? Stress is what you feel when you have to handle more than you are used to. A lot of things can cause stress. You may feel stress when you go on a job interview, take a test, or run a race. This kind of short-term stress is normal and even useful. It can help you if you need to work hard or react quickly. Stress also can last a long time. Long-term stress is caused by stressful situations or events. Examples of long-term stress include long-term health problems, ongoing problems at work, and conflicts in your family. Long-term stress can harm your health. When you have anxiety or stress in your life, one of the ways your body responds is with muscle tension. Progressive muscle relaxation is a method that helps relieve that tension. How does progressive muscle relaxation reduce stress? The body responds to stress with muscle tension, which can cause pain or discomfort. In turn, tense muscles relay to the body that it's stressed. That keeps the cycle of stress and muscle tension going. Progressive muscle relaxation helps break this cycle by reducing muscle tension and general mental anxiety. This method often helps people get to sleep. How do you do progressive muscle relaxation? To do progressive muscle relaxation, first you tense a group of muscles as you breathe in. Then you relax them as you breathe out. Notice how your muscles feel when you relax them. You work on your muscle groups in a certain order. Through practice, you can learn to feel the difference between a tensed muscle and a relaxed muscle. Then you can learn how to turn on this relaxed state at the first sign of a muscle tensing up due to stress.   Practicing this method for a few weeks will help you get better at this skill. In time you'll be able to use this method to relieve stress. When you first start, it may help to use an audio recording until you learn all the muscle groups in order. Check online for these recordings. Choose a place where you won't be interrupted. It should have space for you to lie down on your back and stretch out comfortably, such as on a carpeted floor. Follow-up care is a key part of your treatment and safety. Be sure to make and go to all appointments, and call your doctor if you are having problems. It's also a good idea to know your test results and keep a list of the medicines you take. Where can you learn more? Go to http://amos-chong.info/. Enter S579 in the search box to learn more about \"Learning About Progressive Muscle Relaxation for Stress. \"  Current as of: June 28, 2018  Content Version: 11.9  © 3666-3620 [employer]. Care instructions adapted under license by [employer] (which disclaims liability or warranty for this information). If you have questions about a medical condition or this instruction, always ask your healthcare professional. [place] [address] any warranty or liability for your use of this information. Pneumococcal Polysaccharide Vaccine: What You Need to Know  Why get vaccinated? Vaccination can protect older adults (and some children and younger adults) from pneumococcal disease. Pneumococcal disease is caused by bacteria that can spread from person to person through close contact. It can cause ear infections, and it can also lead to more serious infections of the:  · Lungs (pneumonia),  · Blood (bacteremia), and  · Covering of the brain and spinal cord (meningitis).  Meningitis can cause deafness and brain damage, and it can be fatal.  Anyone can get pneumococcal disease, but children under 3years of age, people with certain medical conditions, adults over 72years of age, and cigarette smokers are at the highest risk. About 18,000 older adults die each year from pneumococcal disease in the [place]. Treatment of pneumococcal infections with penicillin and other drugs used to be more effective. But some strains of the disease have become resistant to these drugs. This makes prevention of the disease, through vaccination, even more important. Pneumococcal polysaccharide vaccine (PPSV23)  Pneumococcal polysaccharide vaccine (PPSV23) protects against 23 types of pneumococcal bacteria. It will not prevent all pneumococcal disease. PPSV23 is recommended for:  · All adults 72years of age and older,  · Anyone 2 through 59years of age with certain long-term health problems,  · Anyone 2 through 59years of age with a weakened immune system,  · Adults 23 through 59years of age who smoke cigarettes or have asthma. Most people need only one dose of PPSV. A second dose is recommended for certain high-risk groups. People 72 and older should get a dose even if they have gotten one or more doses of the vaccine before they turned 65. Your healthcare provider can give you more information about these recommendations. Most healthy adults develop protection within 2 to 3 weeks of getting the shot. Some people should not get this vaccine  · Anyone who has had a life-threatening allergic reaction to PPSV should not get another dose. · Anyone who has a severe allergy to any component of PPSV should not receive it. Tell your provider if you have any severe allergies. · Anyone who is moderately or severely ill when the shot is scheduled may be asked to wait until they recover before getting the vaccine. Someone with a mild illness can usually be vaccinated. · Children less than 3years of age should not receive this vaccine. · There is no evidence that PPSV is harmful to either a pregnant woman or to her fetus.  However, as a precaution, women who need the vaccine should be vaccinated before becoming pregnant, if possible. Risks of a vaccine reaction  With any medicine, including vaccines, there is a chance of side effects. These are usually mild and go away on their own, but serious reactions are also possible. About half of people who get PPSV have mild side effects, such as redness or pain where the shot is given, which go away within about two days. Less than 1 out of 100 people develop a fever, muscle aches, or more severe local reactions. Problems that could happen after any vaccine:  · People sometimes faint after a medical procedure, including vaccination. Sitting or lying down for about 15 minutes can help prevent fainting, and injuries caused by a fall. Tell your doctor if you feel dizzy, or have vision changes or ringing in the ears. · Some people get severe pain in the shoulder and have difficulty moving the arm where a shot was given. This happens very rarely. · Any medication can cause a severe allergic reaction. Such reactions from a vaccine are very rare, estimated at about 1 in a million doses, and would happen within a few minutes to a few hours after the vaccination. As with any medicine, there is a very remote chance of a vaccine causing a serious injury or death. The safety of vaccines is always being monitored. For more information, visit: www.cdc.gov/vaccinesafety/  What if there is a serious reaction? What should I look for? Look for anything that concerns you, such as signs of a severe allergic reaction, very high fever, or unusual behavior. Signs of a severe allergic reaction can include hives, swelling of the face and throat, difficulty breathing, a fast heartbeat, dizziness, and weakness. These would usually start a few minutes to a few hours after the vaccination. What should I do? If you think it is a severe allergic reaction or other emergency that can't wait, call 9-1-1 or get to the nearest hospital. Otherwise, call your doctor.   Afterward, the reaction should be reported to the Vaccine Adverse Event Reporting System (VAERS). Your doctor might file this report, or you can do it yourself through the VAERS web site at www.vaers. [facility].gov, or by calling 8-885.945.4946. VAERS does not give medical advice. How can I learn more? · Ask your doctor. He or she can give you the vaccine package insert or suggest other sources of information. · Call your local or state health department. · Contact the Centers for Disease Control and Prevention (CDC):  ? Call 0-830.761.8402 (1-800-CDC-INFO) or  ? Visit CDC's website at www.cdc.gov/vaccines  Vaccine Information Statement  PPSV Vaccine  (04/24/2015)  Department of Health and Human Services  Centers for Disease Control and Prevention  Many Vaccine Information Statements are available in Togolese and other languages. See www.immunize.org/vis. Hojas de información Sobre Vacunas están disponibles en español y en muchos otros idiomas. Visite Jada.si. Care instructions adapted under license by [employer] (which disclaims liability or warranty for this information). If you have questions about a medical condition or this instruction, always ask your healthcare professional. [address] any warranty or liability for your use of this information.

## 2019-03-21 NOTE — PROGRESS NOTES
Pt is here for   Chief Complaint   Patient presents with    Annual Exam     with labs     Follow-up     BMI     Pt states pain level is a 4/10 back     1. Have you been to the ER, urgent care clinic since your last visit? Hospitalized since your last visit? No    2. Have you seen or consulted any other health care providers outside of the [address] [place] [address] since your last visit? Include any pap smears or colon screening. Rebekah Carmona is a 60 y.o. female who presents for routine immunizations. She denies any symptoms , reactions or allergies that would exclude them from being immunized today. Risks and adverse reactions were discussed and the VIS was given to them. All questions were addressed. She was observed for 15 min post injection. There were no reactions observed. Eda Duran LPN

## 2019-03-21 NOTE — PROGRESS NOTES
dArian Mobley is a 60 y.o. female and presents with Annual Exam (with labs ) and Follow-up (BMI)    Subjective:  Adrian Mobley is a 60 y.o. female presenting for annual checkup. ROS: Feeling well. No dyspnea or chest pain on exertion. No abdominal pain, change in bowel habits, black or bloody stools. No urinary tract or prostatic symptoms. No neurological complaints. Specific concerns today: none. Discussed ADVANCED DIRECTIVE:yes  Advanced Directive on File: yes  Last BM: today, Cook#4. Averages 7 BMs every 7 days. Takes metamucil daily. Dental exam in past 12 months: yes, 2/2019  Eye exam in past 12-24 months: yes, 10/2018  Eating 3 meals daily and exercises 3 times weekly x 40 min. Increased water intake. Getting knee injections with great relief. Last colonoscopy 5 years ago, followed by Dr. Daniel Sanz, told to return in 10 years. Last Mammo 12/2018. Review of Systems  Constitutional: negative for fevers, chills, anorexia and weight loss  Eyes:   negative for visual disturbance, drainage, and irritation  ENT:   + sinus drainage, relieved by use of OTC meds. negative for tinnitus,sore throat,nasal congestion,ear pain,and hoarseness  Respiratory:  negative for cough, hemoptysis, dyspnea, and wheezing  CV:   negative for chest pain, palpitations, and lower extremity edema  GI:   negative for nausea, vomiting, diarrhea, abdominal pain, and melena  Endo:               negative for polyuria,polydipsia,polyphagia, and heat intolerance  Genitourinary: +h/o bladder surgery.  negative for frequency, urgency, dysuria, retention, and hematuria  Integument:  negative for rash, ulcerations, and pruritus  Hematologic:  negative for easy bruising and bleeding  Musculoskel: negative for arthralgias, muscle weakness,and joint pain/swelling  Neurological:  negative for headaches, dizziness, vertigo,and memory/gait problems  Behavl/Psych: negative for feelings of anxiety, depression, suicide, and mood changes    Past Medical History:   Diagnosis Date    Arthritis     Breast cancer ([facility] [address]     left breast lumpectomy    Cancer ([facility] [address]     skin - leg    Chronic pain     GERD (gastroesophageal reflux disease)     Liver disease     hep-c    Radiation therapy complication 7069    left breast     Past Surgical History:   Procedure Laterality Date    BIOPSY LIVER      BREAST SURGERY PROCEDURE UNLISTED  lymph node removed 4/8/15    HX APPENDECTOMY  12/30/2014    Laparoscopic Appendectomy    HX BLADDER SUSPENSION      HX BREAST BIOPSY Left 2015    HX BREAST LUMPECTOMY Left 4/8/15    with radiation     HX CYST INCISION AND DRAINAGE Left 4/8/2015    INCISION AND DRAINAGE BREAST [facility]    HX GYN      hysterectomy    HX ORTHOPAEDIC      shoulder and heel spurs    HX OTHER SURGICAL      Teeth implants     Social History     Socioeconomic History    Marital status:      Spouse name: Not on file    Number of children: Not on file    Years of education: Not on file    Highest education level: Not on file   Tobacco Use    Smoking status: Former Smoker     Packs/day: 1.00     Years: 40.00     Pack years: 40.00     Types: Cigarettes     Last attempt to quit: 4/15/2015     Years since quitting: 3.9    Smokeless tobacco: Never Used    Tobacco comment: using vapor e-cigaretts   Substance and Sexual Activity    Alcohol use: Yes     Comment: occasional    Drug use: No     Family History   Problem Relation Age of Onset    Diabetes Mother     Heart Disease Father     Hypertension Father     Diabetes Father      Current Outpatient Medications   Medication Sig Dispense Refill    meloxicam (MOBIC) 7.5 mg tablet Take 1 Tab by mouth daily. 30 Tab 11    diclofenac (VOLTAREN) 1 % gel APPLY 4 G TO AFFECTED AREA EVERY SIX (6) HOURS.  100 g 3    HYDROcodone-acetaminophen (NORCO) 7.5-325 mg per tablet TAKE 1 TABLET BY MOUTH THREE TIMES A DAY AS NEEDED FOR PAIN  0    PAZEO 0.7 % drop PLACE 1 DROP IN BOTH EYES DAILY  2    fluticasone (FLONASE) 50 mcg/actuation nasal spray PUT 2 SPRAYS IN BOUTH NOSTRILS EVERY DAY 1 Bottle 1    riboflavin, vitamin B2, (VITAMIN B-2 PO) Take  by mouth.  B infantis/B ani/B sami/B bifid (PROBIOTIC 4X PO) Take  by mouth.  traZODone (DESYREL) 50 mg tablet Take 1-2 tabs every night for sleep. 60 Tab 11    raNITIdine (ZANTAC) 150 mg tablet 300 mg two (2) times a day.  cholecalciferol (VITAMIN D3) 1,000 unit tablet Take  by mouth daily.  levocetirizine (XYZAL) 5 mg tablet Take 1 Tab by mouth daily. 30 Tab 11    magnesium oxide (MAG-OX) 400 mg tablet Take 400 mg by mouth daily.  docusate sodium (COLACE) 100 mg capsule Take 1 Cap by mouth two (2) times a day. May use over-the counter equivalent instead. Take twice daily while on narcotic pain reliever to prevent constipation. 60 Cap 0    fiber cap Take 2 Caps by mouth daily.  Omega-3-DHA-EPA-Fish Oil 1,000 (120-180) mg cap Take 1 Cap by mouth daily.  oxyCODONE-acetaminophen (PERCOCET) 5-325 mg per tablet TAKE 1 TO 2 TABLET BY MOUTH EVERY 4 TO 6 HOURS AS NEEDED FOR PAIN  0    GLUCOSAMINE HCL/CHONDR PORTILLO A NA (OSTEO BI-FLEX PO) Take  by mouth. Allergies   Allergen Reactions    Pcn [Penicillins] Anaphylaxis       Objective:  Visit Vitals  /77 (BP 1 Location: Right arm, BP Patient Position: Sitting)   Pulse 64   Temp 97.4 °F (36.3 °C) (Oral)   Resp 17   Ht 5' 6\" (1.676 m)   Wt 192 lb (87.1 kg)   SpO2 97%   BMI 30.99 kg/m²     Wt Readings from Last 3 Encounters:   03/21/19 192 lb (87.1 kg)   09/20/18 197 lb 8 oz (89.6 kg)   05/07/18 195 lb 9.6 oz (88.7 kg)     Physical Exam:   General appearance - alert, well appearing, and in no distress. Mental status - A/O x 4, normal mood and affect. Head/Eyes- AT/NC. PHILLY, EOMI, corneas normal, no foreign bodies. Ears- TM injected bilaterally, no erythema or drainage. Mild redness to canal on right.    Nose- Septum midline, pink mucosa. Turbinates normal, no polyps or erythema. No sinus tenderness. Mouth/Throat - mucous membranes moist, pharynx normal without lesions. No tonsillar swelling or exudates. Neck -Supple ,normal CSP. FROM, non-tender. No adenopathy/thyromegaly. No JVD. Chest - CTA. Symmetric chest rise. No wheezing, rales or rhonchi. Heart - Normal rate, regular rhythm. Normal S1, S2. No MGR. Abdomen - Soft,non-distended. Normoactive BS in all quadrants. NT, no mass, rebound, or HSM   Ext- Radial, DP pulses, 2+ bilaterally. No pedal edema, clubbing, or cyanosis. Skin- Normal for ethnicity, warm, and dry. No hyperpigmentation, ulcerations, or suspicious lesions  Neuro - Normal speech, no focal findings. Normal strength and muscle tone. Coordination and gait normal.   CN II-XII intact. Cold and vibratory sensation intact. Normal DTR's. Assessment/Plan:  Labs ordered. The current medical regimen is effective;  continue present plan and medications. Medication Side Effects and Warnings were discussed with patient: yes   Patient Labs were reviewed: yes  Patient Past Records were reviewed: yes  See orders below      ICD-10-CM ICD-9-CM    1. Adult general medical examination E66.09 T85.2 METABOLIC PANEL, COMPREHENSIVE      CBC WITH AUTOMATED DIFF      HEMOGLOBIN A1C WITH EAG      LIPID PANEL      TSH 3RD GENERATION   2. Advance care planning Z71.89 V65.49    3. Former smoker Z87.891 V15.82    4. History of hepatitis C J01.97 X35.13 METABOLIC PANEL, COMPREHENSIVE   5. Colon cancer screening Z12.11 V76.51    6.  Screening for endocrine, nutritional, metabolic and immunity disorder K45.54 E81.19 METABOLIC PANEL, COMPREHENSIVE    Z13.21  CBC WITH AUTOMATED DIFF    Z13.228  HEMOGLOBIN A1C WITH EAG    Z13.0  TSH 3RD GENERATION   7. Screening for lipid disorders Z13.220 V77.91 LIPID PANEL     Orders Placed This Encounter    METABOLIC PANEL, COMPREHENSIVE    CBC WITH AUTOMATED DIFF    HEMOGLOBIN A1C WITH EAG    LIPID PANEL  TSH 3RD GENERATION         Zygmunt [place] Corky expressed understanding of plan. An After Visit Summary was offered/printed and given to the patient.

## 2019-03-22 LAB
ALBUMIN SERPL-MCNC: 4.6 G/DL (ref 3.5–5.5)
ALBUMIN/GLOB SERPL: 1.6 {RATIO} (ref 1.2–2.2)
ALP SERPL-CCNC: 71 IU/L (ref 39–117)
ALT SERPL-CCNC: 19 IU/L (ref 0–32)
AST SERPL-CCNC: 20 IU/L (ref 0–40)
BASOPHILS # BLD AUTO: 0 X10E3/UL (ref 0–0.2)
BASOPHILS NFR BLD AUTO: 1 %
BILIRUB SERPL-MCNC: 0.4 MG/DL (ref 0–1.2)
BUN SERPL-MCNC: 25 MG/DL (ref 6–24)
BUN/CREAT SERPL: 36 (ref 9–23)
CALCIUM SERPL-MCNC: 9.8 MG/DL (ref 8.7–10.2)
CHLORIDE SERPL-SCNC: 102 MMOL/L (ref 96–106)
CHOLEST SERPL-MCNC: 194 MG/DL (ref 100–199)
CO2 SERPL-SCNC: 24 MMOL/L (ref 20–29)
CREAT SERPL-MCNC: 0.7 MG/DL (ref 0.57–1)
EOSINOPHIL # BLD AUTO: 0.3 X10E3/UL (ref 0–0.4)
EOSINOPHIL NFR BLD AUTO: 5 %
ERYTHROCYTE [DISTWIDTH] IN BLOOD BY AUTOMATED COUNT: 13.9 % (ref 12.3–15.4)
EST. AVERAGE GLUCOSE BLD GHB EST-MCNC: 117 MG/DL
GLOBULIN SER CALC-MCNC: 2.8 G/DL (ref 1.5–4.5)
GLUCOSE SERPL-MCNC: 95 MG/DL (ref 65–99)
HBA1C MFR BLD: 5.7 % (ref 4.8–5.6)
HCT VFR BLD AUTO: 41.6 % (ref 34–46.6)
HDLC SERPL-MCNC: 67 MG/DL
HGB BLD-MCNC: 13.4 G/DL (ref 11.1–15.9)
IMM GRANULOCYTES # BLD AUTO: 0 X10E3/UL (ref 0–0.1)
IMM GRANULOCYTES NFR BLD AUTO: 0 %
INTERPRETATION, 910389: NORMAL
LDLC SERPL CALC-MCNC: 113 MG/DL (ref 0–99)
LYMPHOCYTES # BLD AUTO: 2.7 X10E3/UL (ref 0.7–3.1)
LYMPHOCYTES NFR BLD AUTO: 43 %
MCH RBC QN AUTO: 29.9 PG (ref 26.6–33)
MCHC RBC AUTO-ENTMCNC: 32.2 G/DL (ref 31.5–35.7)
MCV RBC AUTO: 93 FL (ref 79–97)
MONOCYTES # BLD AUTO: 0.9 X10E3/UL (ref 0.1–0.9)
MONOCYTES NFR BLD AUTO: 14 %
NEUTROPHILS # BLD AUTO: 2.3 X10E3/UL (ref 1.4–7)
NEUTROPHILS NFR BLD AUTO: 37 %
PLATELET # BLD AUTO: 280 X10E3/UL (ref 150–379)
POTASSIUM SERPL-SCNC: 4.4 MMOL/L (ref 3.5–5.2)
PROT SERPL-MCNC: 7.4 G/DL (ref 6–8.5)
RBC # BLD AUTO: 4.48 X10E6/UL (ref 3.77–5.28)
SODIUM SERPL-SCNC: 140 MMOL/L (ref 134–144)
TRIGL SERPL-MCNC: 72 MG/DL (ref 0–149)
TSH SERPL DL<=0.005 MIU/L-ACNC: 1.52 UIU/ML (ref 0.45–4.5)
VLDLC SERPL CALC-MCNC: 14 MG/DL (ref 5–40)
WBC # BLD AUTO: 6.3 X10E3/UL (ref 3.4–10.8)

## 2019-03-26 PROBLEM — R73.03 PREDIABETES: Status: ACTIVE | Noted: 2019-03-26

## 2019-04-29 RX ORDER — TRAZODONE HYDROCHLORIDE 50 MG/1
TABLET ORAL
Qty: 180 TAB | Refills: 2 | Status: SHIPPED | OUTPATIENT
Start: 2019-04-29 | End: 2020-07-16 | Stop reason: SDUPTHER

## 2019-08-30 PROBLEM — K22.70 BARRETT'S ESOPHAGUS WITHOUT DYSPLASIA: Status: ACTIVE | Noted: 2019-08-30

## 2019-10-03 ENCOUNTER — HOSPITAL ENCOUNTER (OUTPATIENT)
Dept: MAMMOGRAPHY | Age: 59
Discharge: HOME | End: 2019-10-03
Attending: INTERNAL MEDICINE
Payer: COMMERCIAL

## 2019-10-03 ENCOUNTER — HOSPITAL ENCOUNTER (OUTPATIENT)
Dept: ULTRASOUND IMAGING | Age: 59
Discharge: HOME | End: 2019-10-03
Attending: INTERNAL MEDICINE
Payer: COMMERCIAL

## 2019-10-03 DIAGNOSIS — C50.412 MALIGNANT NEOPLASM OF UPPER-OUTER QUADRANT OF LEFT FEMALE BREAST (HCC): ICD-10-CM

## 2019-10-03 DIAGNOSIS — N64.4 MASTODYNIA: ICD-10-CM

## 2019-10-03 DIAGNOSIS — R22.32 LOCALIZED SWELLING, MASS AND LUMP, LEFT UPPER LIMB: ICD-10-CM

## 2019-10-03 PROCEDURE — 77065 DX MAMMO INCL CAD UNI: CPT

## 2019-12-01 RX ORDER — FLUTICASONE PROPIONATE 50 MCG
SPRAY, SUSPENSION (ML) NASAL
Qty: 3 BOTTLE | Refills: 3 | Status: SHIPPED | OUTPATIENT
Start: 2019-12-01 | End: 2021-01-29

## 2020-01-07 ENCOUNTER — HOSPITAL ENCOUNTER (OUTPATIENT)
Dept: MAMMOGRAPHY | Age: 60
Discharge: HOME | End: 2020-01-07
Attending: RADIOLOGY
Payer: COMMERCIAL

## 2020-01-07 DIAGNOSIS — Z85.3 PERSONAL HISTORY OF MALIGNANT NEOPLASM OF BREAST: ICD-10-CM

## 2020-01-07 PROCEDURE — 77062 BREAST TOMOSYNTHESIS BI: CPT

## 2020-02-24 RX ORDER — MELOXICAM 7.5 MG/1
TABLET ORAL
Qty: 90 TAB | Refills: 3 | Status: SHIPPED | OUTPATIENT
Start: 2020-02-24 | End: 2021-03-12

## 2020-07-16 ENCOUNTER — OFFICE VISIT (OUTPATIENT)
Dept: INTERNAL MEDICINE CLINIC | Age: 60
End: 2020-07-16

## 2020-07-16 VITALS
SYSTOLIC BLOOD PRESSURE: 116 MMHG | HEIGHT: 66 IN | BODY MASS INDEX: 31.18 KG/M2 | RESPIRATION RATE: 17 BRPM | HEART RATE: 59 BPM | DIASTOLIC BLOOD PRESSURE: 67 MMHG | OXYGEN SATURATION: 97 % | TEMPERATURE: 98.6 F | WEIGHT: 194 LBS

## 2020-07-16 DIAGNOSIS — Z13.0 SCREENING FOR ENDOCRINE, NUTRITIONAL, METABOLIC AND IMMUNITY DISORDER: ICD-10-CM

## 2020-07-16 DIAGNOSIS — Z11.4 SCREENING FOR HIV WITHOUT PRESENCE OF RISK FACTORS: ICD-10-CM

## 2020-07-16 DIAGNOSIS — R73.03 PREDIABETES: ICD-10-CM

## 2020-07-16 DIAGNOSIS — Z13.21 SCREENING FOR ENDOCRINE, NUTRITIONAL, METABOLIC AND IMMUNITY DISORDER: ICD-10-CM

## 2020-07-16 DIAGNOSIS — Z13.228 SCREENING FOR ENDOCRINE, NUTRITIONAL, METABOLIC AND IMMUNITY DISORDER: ICD-10-CM

## 2020-07-16 DIAGNOSIS — Z85.3 HISTORY OF BREAST CANCER: ICD-10-CM

## 2020-07-16 DIAGNOSIS — Z00.00 ADULT GENERAL MEDICAL EXAMINATION: Primary | ICD-10-CM

## 2020-07-16 DIAGNOSIS — K22.70 BARRETT'S ESOPHAGUS WITHOUT DYSPLASIA: ICD-10-CM

## 2020-07-16 DIAGNOSIS — F51.01 PRIMARY INSOMNIA: ICD-10-CM

## 2020-07-16 DIAGNOSIS — Z13.220 SCREENING FOR LIPID DISORDERS: ICD-10-CM

## 2020-07-16 DIAGNOSIS — Z13.29 SCREENING FOR ENDOCRINE, NUTRITIONAL, METABOLIC AND IMMUNITY DISORDER: ICD-10-CM

## 2020-07-16 RX ORDER — CHOLECALCIFEROL TAB 125 MCG (5000 UNIT) 125 MCG
5000 TAB ORAL DAILY
Qty: 90 TAB | Refills: 3 | Status: SHIPPED | OUTPATIENT
Start: 2020-07-16 | End: 2021-04-28

## 2020-07-16 RX ORDER — OMEPRAZOLE 40 MG/1
40 CAPSULE, DELAYED RELEASE ORAL DAILY
COMMUNITY

## 2020-07-16 RX ORDER — TRAZODONE HYDROCHLORIDE 50 MG/1
100 TABLET ORAL
Qty: 180 TAB | Refills: 3 | Status: SHIPPED | OUTPATIENT
Start: 2020-07-16 | End: 2021-05-17 | Stop reason: ALTCHOICE

## 2020-07-16 NOTE — PROGRESS NOTES
Pt is here for   Chief Complaint   Patient presents with    Complete Physical     Pt denies pain at this time    1. Have you been to the ER, urgent care clinic since your last visit? Hospitalized since your last visit? No    2. Have you seen or consulted any other health care providers outside of the [address] [place] [address] since your last visit? Include any pap smears or colon screening.  No

## 2020-07-16 NOTE — PROGRESS NOTES
Misbah Davis is a 60 y.o. female presenting for annual checkup. Specific concerns today: none. Insomnia Review:  Pt presents for insomnia, reporting difficulty falling and staying asleep. Started months ago, unchanged. Aggravated by nothing. Alleviated by Rx meds: trazodone. Related symptoms include fatigue, difficulty concentrating, daytime drowsiness. Denies h/o PTSD, substance abuse, and alcoholism. ROS: Feeling well. No dyspnea or chest pain on exertion. No abdominal pain, change in bowel habits, black or bloody stools. Last BM: today, Gaastra#4. Averages 7 BMs every 7 days. Averages drinking 4 bottles of water daily. No urinary tract or gynecologic/prostatic symptoms. No neurological complaints. Review of Systems  Constitutional: negative for fevers, chills, anorexia and weight loss  Eyes:   negative for visual disturbance, drainage, and irritation  ENT:   negative for tinnitus,sore throat,nasal congestion,ear pain,and hoarseness  Respiratory:  negative for cough, hemoptysis, dyspnea, and wheezing  CV:   negative for chest pain, palpitations, and lower extremity edema  GI:   +Luciano's esophagus, on PPI. negative for nausea, vomiting, diarrhea, and melena  Endo:               negative for polyuria,polydipsia,polyphagia, and heat intolerance  Genitourinary: negative for frequency, urgency, dysuria, retention, and hematuria  Integument:  negative for rash, ulcerations, and pruritus  Hematologic:  negative for easy bruising and bleeding  Musculoskel: negative for arthralgias, muscle weakness,and joint pain/swelling  Neurological:  negative for headaches, dizziness, vertigo,and memory/gait problems  Behavl/Psych: negative for feelings of anxiety, depression, suicide, and mood changes    Dental exam in past 12 months: yes, every 6 months  Eye exam in past 12-24 months: yes    Last mammo: 1/2020, yearly now from every 6 months.  5 yrs out from breast ca dx  Last colonoscopy: 8/2019    Past Medical History:   Diagnosis Date    Arthritis     Breast cancer ([facility] [address] 2015    left breast lumpectomy-2015, radiation    Cancer ([facility] [address]     skin - leg    Chronic pain     GERD (gastroesophageal reflux disease)     Liver disease     hep-c    Radiation therapy complication 3515    left breast     Past Surgical History:   Procedure Laterality Date    BIOPSY LIVER      BREAST SURGERY PROCEDURE UNLISTED  lymph node removed 4/8/15    HX APPENDECTOMY  2014    Laparoscopic Appendectomy    HX BLADDER SUSPENSION      HX BREAST BIOPSY Left 2015    HX BREAST LUMPECTOMY Left 4/8/15    with radiation     HX CYST INCISION AND DRAINAGE Left 2015    INCISION AND DRAINAGE BREAST performed by Endy Cano MD at [facility]\"" MAIN OR    HX GYN      hysterectomy    HX ORTHOPAEDIC      shoulder and heel spurs    HX OTHER SURGICAL      Teeth implants     Social History     Socioeconomic History    Marital status:      Spouse name: Not on file    Number of children: Not on file    Years of education: Not on file    Highest education level: Not on file   Tobacco Use    Smoking status: Former Smoker     Packs/day: 1.00     Years: 40.00     Pack years: 40.00     Types: Cigarettes     Last attempt to quit: 4/15/2015     Years since quittin.2    Smokeless tobacco: Never Used    Tobacco comment: using vapor e-cigaretts   Substance and Sexual Activity    Alcohol use: Yes     Comment: occasional    Drug use: No    Sexual activity: Not Currently     Family History   Problem Relation Age of Onset    Diabetes Mother     Heart Disease Father     Hypertension Father     Diabetes Father      Current Outpatient Medications   Medication Sig Dispense Refill    VITAMIN B COMPLEX-100 PO Take  by mouth.  omeprazole (PRILOSEC) 40 mg capsule Take 40 mg by mouth daily.  cholecalciferol (VITAMIN D3) (5000 Units/125 mcg) tab tablet Take 1 Tab by mouth daily.  Indications: low vitamin D levels 90 Tab 3    traZODone (DESYREL) 50 mg tablet Take 2 Tabs by mouth nightly. TAKE 1 TO 2 TABLETS EVERY NIGHT FOR SLEEP. 180 Tab 3    meloxicam (MOBIC) 7.5 mg tablet TAKE 1 TABLET BY MOUTH EVERY DAY 90 Tab 3    fluticasone propionate (FLONASE) 50 mcg/actuation nasal spray INSTILL 2 SPRAYS IN BOTH NOSTRILS EACH DAY 3 Bottle 3    diclofenac (VOLTAREN) 1 % gel APPLY 4 G TO AFFECTED AREA EVERY SIX (6) HOURS. 100 g 3    PAZEO 0.7 % drop PLACE 1 DROP IN BOTH EYES DAILY  2    B infantis/B ani/B sami/B bifid (PROBIOTIC 4X PO) Take  by mouth.  levocetirizine (XYZAL) 5 mg tablet Take 1 Tab by mouth daily. 30 Tab 11    magnesium oxide (MAG-OX) 400 mg tablet Take 400 mg by mouth daily.  fiber cap Take 2 Caps by mouth daily. Allergies   Allergen Reactions    Pcn [Penicillins] Anaphylaxis       Objective:  Visit Vitals  /67 (BP 1 Location: Right arm, BP Patient Position: Sitting)   Pulse (!) 59   Temp 98.6 °F (37 °C) (Oral)   Resp 17   Ht 5' 6\" (1.676 m)   Wt 194 lb (88 kg)   SpO2 97%   BMI 31.31 kg/m²     Wt Readings from Last 3 Encounters:   07/16/20 194 lb (88 kg)   03/21/19 192 lb (87.1 kg)   09/20/18 197 lb 8 oz (89.6 kg)     Physical Exam:   General appearance - alert, well appearing, and in no distress. Mental status - A/O x 4, normal mood and affect. Head/Eyes- AT/NC. PHILLY, EOMI, corneas normal, no foreign bodies. Ears- TM intact bilaterally, no erythema or drainage. Nose- Septum midline, pink mucosa. Turbinates normal, no polyps or erythema. No sinus tenderness. Mouth/Throat - mucous membranes moist, pharynx normal without lesions. No tonsillar swelling or exudates. Neck -Supple ,normal CSP. FROM, non-tender. No adenopathy/thyromegaly. No JVD. Chest - CTA. Symmetric chest rise. No wheezing, rales or rhonchi. Heart - Normal rate, regular rhythm. Normal S1, S2. No MGR. Abdomen - Soft,non-distended. Normoactive BS in all quadrants. NT, no mass, rebound, or HSM   Ext- Radial, DP pulses, 2+ bilaterally.  No pedal edema, clubbing, or cyanosis. Skin- Normal for ethnicity, warm, and dry. No hyperpigmentation, ulcerations, or suspicious lesions  Neuro - Normal speech, no focal findings. Normal strength and muscle tone. Coordination and gait normal.      Assessment/Plan:  Labs ordered. INCREASED Trazodone to 100 mg QHS and encouraged pt to try CBD oil at bedtime as an adjunct too. Increased Vit D for reportedly low level per ONC in Jan. Prescribed 50,000 units at that time, but as otherwise taking 1000 unit tabs since running out. Medication Side Effects and Warnings were discussed with patient: yes   Patient Labs were reviewed: yes  Patient Past Records were reviewed: yes  See orders below      ICD-10-CM ICD-9-CM    1. Adult general medical examination  G63.83 Y82.1 METABOLIC PANEL, BASIC      CBC W/O DIFF      LIPID PANEL      HEMOGLOBIN A1C WITH EAG   2. Screening for lipid disorders  Z13.220 V77.91 LIPID PANEL   3. Screening for endocrine, nutritional, metabolic and immunity disorder  X91.72 X50.36 METABOLIC PANEL, BASIC    W53.88  CBC W/O DIFF    Z13.228  HEMOGLOBIN A1C WITH EAG    Z13.0     4. Prediabetes  R73.03 790.29 HEMOGLOBIN A1C WITH EAG   5. Screening for HIV without presence of risk factors  Z11.4 V73.89 HIV 1/2 AG/AB, 4TH GENERATION,W RFLX CONFIRM   6. Primary insomnia  F51.01 307.42    7. Luciano's esophagus without dysplasia  K22.70 530.85    8. History of breast cancer  Z85.3 V10.3      Orders Placed This Encounter    METABOLIC PANEL, BASIC    CBC W/O DIFF    LIPID PANEL    HEMOGLOBIN A1C WITH EAG    HIV 1/2 AG/AB, 4TH GENERATION,W RFLX CONFIRM    VITAMIN B COMPLEX-100 PO     Sig: Take  by mouth.  omeprazole (PRILOSEC) 40 mg capsule     Sig: Take 40 mg by mouth daily.  cholecalciferol (VITAMIN D3) (5000 Units/125 mcg) tab tablet     Sig: Take 1 Tab by mouth daily.  Indications: low vitamin D levels     Dispense:  90 Tab     Refill:  3    traZODone (DESYREL) 50 mg tablet     Sig: Take 2 Tabs by mouth nightly. TAKE 1 TO 2 TABLETS EVERY NIGHT FOR SLEEP. Dispense:  180 Tab     Refill:  3     Follow-up and Dispositions    · Return in about 6 months (around 1/16/2021) for VV- insomnia. Zuly Garcia expressed understanding of plan. An After Visit Summary was offered/printed and given to the patient.

## 2020-07-16 NOTE — PATIENT INSTRUCTIONS
Well Visit, Women 48 to 72: Care Instructions  Your Care Instructions     Physical exams can help you stay healthy. Your doctor has checked your overall health and may have suggested ways to take good care of yourself. He or she also may have recommended tests. At home, you can help prevent illness with healthy eating, regular exercise, and other steps. Follow-up care is a key part of your treatment and safety. Be sure to make and go to all appointments, and call your doctor if you are having problems. It's also a good idea to know your test results and keep a list of the medicines you take. How can you care for yourself at home? · Reach and stay at a healthy weight. This will lower your risk for many problems, such as obesity, diabetes, heart disease, and high blood pressure. · Get at least 30 minutes of exercise on most days of the week. Walking is a good choice. You also may want to do other activities, such as running, swimming, cycling, or playing tennis or team sports. · Do not smoke. Smoking can make health problems worse. If you need help quitting, talk to your doctor about stop-smoking programs and medicines. These can increase your chances of quitting for good. · Protect your skin from too much sun. When you're outdoors from 10 a.m. to 4 p.m., stay in the shade or cover up with clothing and a hat with a wide brim. Wear sunglasses that block UV rays. Even when it's cloudy, put broad-spectrum sunscreen (SPF 30 or higher) on any exposed skin. · See a dentist one or two times a year for checkups and to have your teeth cleaned. · Wear a seat belt in the car. Follow your doctor's advice about when to have certain tests. These tests can spot problems early. · Cholesterol. Your doctor will tell you how often to have this done based on your age, family history, or other things that can increase your risk for heart attack and stroke. · Blood pressure.  Have your blood pressure checked during a routine doctor visit. Your doctor will tell you how often to check your blood pressure based on your age, your blood pressure results, and other factors. · Mammogram. Ask your doctor how often you should have a mammogram, which is an X-ray of your breasts. A mammogram can spot breast cancer before it can be felt and when it is easiest to treat. · Pap test and pelvic exam. Ask your doctor how often you should have a Pap test. You may not need to have a Pap test as often as you used to. · Vision. Have your eyes checked every year or two or as often as your doctor suggests. Some experts recommend that you have yearly exams for glaucoma and other age-related eye problems starting at age 48. · Hearing. Tell your doctor if you notice any change in your hearing. You can have tests to find out how well you hear. · Diabetes. Ask your doctor whether you should have tests for diabetes. · Colorectal cancer. Your risk for colorectal cancer gets higher as you get older. Some experts say that adults should start regular screening at age 48 and stop at age 76. Others say to start before age 48 or continue after age 76. Talk with your doctor about your risk and when to start and stop screening. · Thyroid disease. Talk to your doctor about whether to have your thyroid checked as part of a regular physical exam. Women have an increased chance of a thyroid problem. · Osteoporosis. You should begin tests for bone density at age 72. If you are younger than 72, ask your doctor whether you have factors that may increase your risk for this disease. You may want to have this test before age 72. · Heart attack and stroke risk. At least every 4 to 6 years, you should have your risk for heart attack and stroke assessed. Your doctor uses factors such as your age, blood pressure, cholesterol, and whether you smoke or have diabetes to show what your risk for a heart attack or stroke is over the next 10 years.   When should you call for help?  Watch closely for changes in your health, and be sure to contact your doctor if you have any problems or symptoms that concern you. Where can you learn more? Go to http://amos-chong.info/  Enter [id number] in the search box to learn more about \"Well Visit, Women 50 to 72: Care Instructions. \"  Current as of: August 22, 2019               Content Version: 12.5  © 2912-7804 [employer], [employer]. Care instructions adapted under license by [employer] (which disclaims liability or warranty for this information). If you have questions about a medical condition or this instruction, always ask your healthcare professional. [address] any warranty or liability for your use of this information.

## 2020-07-17 LAB
BUN SERPL-MCNC: 21 MG/DL (ref 8–27)
BUN/CREAT SERPL: 29 (ref 12–28)
CALCIUM SERPL-MCNC: 9.7 MG/DL (ref 8.7–10.3)
CHLORIDE SERPL-SCNC: 101 MMOL/L (ref 96–106)
CHOLEST SERPL-MCNC: 204 MG/DL (ref 100–199)
CO2 SERPL-SCNC: 25 MMOL/L (ref 20–29)
CREAT SERPL-MCNC: 0.72 MG/DL (ref 0.57–1)
ERYTHROCYTE [DISTWIDTH] IN BLOOD BY AUTOMATED COUNT: 12.7 % (ref 11.7–15.4)
EST. AVERAGE GLUCOSE BLD GHB EST-MCNC: 111 MG/DL
GLUCOSE SERPL-MCNC: 89 MG/DL (ref 65–99)
HBA1C MFR BLD: 5.5 % (ref 4.8–5.6)
HCT VFR BLD AUTO: 43.6 % (ref 34–46.6)
HDLC SERPL-MCNC: 74 MG/DL
HGB BLD-MCNC: 14.1 G/DL (ref 11.1–15.9)
HIV 1+2 AB+HIV1 P24 AG SERPL QL IA: NON REACTIVE
INTERPRETATION, 910389: NORMAL
LDLC SERPL CALC-MCNC: 119 MG/DL (ref 0–99)
MCH RBC QN AUTO: 29.8 PG (ref 26.6–33)
MCHC RBC AUTO-ENTMCNC: 32.3 G/DL (ref 31.5–35.7)
MCV RBC AUTO: 92 FL (ref 79–97)
PLATELET # BLD AUTO: 278 X10E3/UL (ref 150–450)
POTASSIUM SERPL-SCNC: 5 MMOL/L (ref 3.5–5.2)
RBC # BLD AUTO: 4.73 X10E6/UL (ref 3.77–5.28)
SODIUM SERPL-SCNC: 144 MMOL/L (ref 134–144)
TRIGL SERPL-MCNC: 57 MG/DL (ref 0–149)
VLDLC SERPL CALC-MCNC: 11 MG/DL (ref 5–40)
WBC # BLD AUTO: 8.7 X10E3/UL (ref 3.4–10.8)

## 2020-07-17 NOTE — PROGRESS NOTES
NML/Stable labs, no changes. Pt may schedule visit to review. Otherwise we will discuss at the next OV. Your CHOLESTEROL is up. Stay active, eat a LOW-CARB diet (avoiding bread, rice, pasta, desserts, soda)  with fish and other lean meats, take a fish oil supplement with DHA and EPA daily, along with exercise to help reduce cholesterol and raise good cholesterol. Eating healthy nuts like walnuts, pecans, and ALMONDS may help also. TRY RED YEAST RICE (if your LDL is HIGH) OR Flush-free NIACIN 500mg (if your TRIG or up), once daily helps to improve your numbers too. Do NOT take Niacin or red yeast rice together, they interact with each other. Both are intended to be taken WITH FISH OIL however.

## 2020-12-23 ENCOUNTER — TRANSCRIBE ORDER (OUTPATIENT)
Dept: SCHEDULING | Age: 60
End: 2020-12-23

## 2020-12-23 DIAGNOSIS — N64.4 MASTODYNIA: ICD-10-CM

## 2020-12-23 DIAGNOSIS — C50.412 MALIGNANT NEOPLASM OF UPPER-OUTER QUADRANT OF LEFT FEMALE BREAST (HCC): Primary | ICD-10-CM

## 2020-12-23 DIAGNOSIS — R22.32 LOCALIZED SWELLING, MASS AND LUMP, LEFT UPPER LIMB: ICD-10-CM

## 2021-01-29 RX ORDER — FLUTICASONE PROPIONATE 50 MCG
SPRAY, SUSPENSION (ML) NASAL
Qty: 1 BOTTLE | Refills: 3 | Status: SHIPPED | OUTPATIENT
Start: 2021-01-29

## 2021-02-03 ENCOUNTER — HOSPITAL ENCOUNTER (OUTPATIENT)
Dept: MAMMOGRAPHY | Age: 61
Discharge: HOME | End: 2021-02-03
Attending: INTERNAL MEDICINE
Payer: COMMERCIAL

## 2021-02-03 DIAGNOSIS — C50.412 MALIGNANT NEOPLASM OF UPPER-OUTER QUADRANT OF LEFT FEMALE BREAST (HCC): ICD-10-CM

## 2021-02-03 DIAGNOSIS — R92.8 ABNORMAL MAMMOGRAM: ICD-10-CM

## 2021-02-03 DIAGNOSIS — N64.4 MASTODYNIA: ICD-10-CM

## 2021-02-03 DIAGNOSIS — R22.32 LOCALIZED SWELLING, MASS AND LUMP, LEFT UPPER LIMB: ICD-10-CM

## 2021-02-03 PROCEDURE — 77063 BREAST TOMOSYNTHESIS BI: CPT

## 2021-03-12 RX ORDER — MELOXICAM 7.5 MG/1
TABLET ORAL
Qty: 90 TAB | Refills: 3 | Status: SHIPPED | OUTPATIENT
Start: 2021-03-12 | End: 2022-03-18

## 2021-03-19 ENCOUNTER — OFFICE VISIT (OUTPATIENT)
Dept: INTERNAL MEDICINE CLINIC | Age: 61
End: 2021-03-19
Payer: COMMERCIAL

## 2021-03-19 VITALS
HEART RATE: 66 BPM | SYSTOLIC BLOOD PRESSURE: 125 MMHG | RESPIRATION RATE: 18 BRPM | OXYGEN SATURATION: 97 % | DIASTOLIC BLOOD PRESSURE: 75 MMHG | WEIGHT: 168 LBS | HEIGHT: 66 IN | BODY MASS INDEX: 27 KG/M2 | TEMPERATURE: 96.9 F

## 2021-03-19 DIAGNOSIS — F51.01 PRIMARY INSOMNIA: Primary | ICD-10-CM

## 2021-03-19 DIAGNOSIS — G62.9 NEUROPATHY: ICD-10-CM

## 2021-03-19 PROCEDURE — 99214 OFFICE O/P EST MOD 30 MIN: CPT | Performed by: NURSE PRACTITIONER

## 2021-03-19 NOTE — PROGRESS NOTES
Alaina Mathews (: 1960) is a 63 y.o. female, established patient, here for evaluation of the following chief complaint(s):  Follow-up (medication change, would like to change the trazadone, pt states that she's taking 2 and 2 10mg melatonin and is still only getting 2 days of good sleep out of seven )       ASSESSMENT/PLAN:  1. Primary insomnia  -     suvorexant (BELSOMRA) 10 mg tablet; Take 1 Tab by mouth nightly as needed for Insomnia. Max Daily Amount: 10 mg., Normal, Disp-30 Tab, R-5  2. Neuropathy      Return in about 4 weeks (around 2021) for VV- INSOMNIA med change, Zynex start. Needs Annual in  also please. Pt asked to complete follow by next visit: continue present plan    SUBJECTIVE/OBJECTIVE:  HPI    Pt presents to f/u insomnia. Taking trazodone 100mg and melatonin 2 tabs. Denies side effects from medication. Feels like it only works by the 3rd day. Dorsal left foot with \"odd\" sensation noted. Able to feel, but no pain. No tingling. Had breast cancer 6 years ago with lumpectomy and radiation. No chemo.        Review of Systems  Constitutional: negative for fevers, chills, anorexia and weight loss  Respiratory:  negative for cough, hemoptysis, dyspnea, and wheezing  CV:   negative for chest pain, palpitations, and lower extremity edema  GI:   negative for nausea, vomiting, diarrhea, abdominal pain, and melena  Endo:               negative for polyuria,polydipsia,polyphagia, and heat intolerance  Genitourinary: negative for frequency, urgency, dysuria, retention, and hematuria  Integument:  negative for rash, ulcerations, and pruritus  Hematologic:  negative for easy bruising and bleeding  Musculoskel: negative for arthralgias, muscle weakness,and joint pain/swelling  Neurological:  negative for headaches, dizziness, vertigo,and memory/gait problems  Behavl/Psych: negative for feelings of anxiety, depression, suicide, and mood changes    Visit Vitals  /75 (BP 1 Location: Left upper arm, BP Patient Position: Sitting, BP Cuff Size: Large adult)   Pulse 66   Temp 96.9 °F (36.1 °C) (Oral)   Resp 18   Ht 5' 6\" (1.676 m)   Wt 168 lb (76.2 kg)   SpO2 97%   BMI 27.12 kg/m²       Wt Readings from Last 3 Encounters:   03/19/21 168 lb (76.2 kg)   07/16/20 194 lb (88 kg)   03/21/19 192 lb (87.1 kg)         Physical Exam:   General appearance - alert, well appearing, and in no distress. Mental status - A/O x 4,normal mood and affect. Chest -  Symmetric chest rise. No wheezing. No distress. Heart - Normal rate. Abdomen- Soft, round. Non-distended, NT. No pulsatile masses or hernias. Ext-  No pedal edema, clubbing, or cyanosis. Skin-Warm and dry. No hyperpigmentation, ulcerations, or suspicious lesions. Neuro - Normal speech, no focal findings or movement disorder. Normal strength, gait, and muscle tone. An electronic signature was used to authenticate this note.   -- Liborio Bradshaw NP

## 2021-03-19 NOTE — PROGRESS NOTES
Pt is here for   Chief Complaint   Patient presents with    Follow-up     medication change, would like to change the trazadone, pt states that she's taking 2 and 2 10mg melatonin and is still only getting 2 days of good sleep out of seven      1. Have you been to the ER, urgent care clinic since your last visit? Hospitalized since your last visit? No    2. Have you seen or consulted any other health care providers outside of the [address] [place] [address] since your last visit? Include any pap smears or colon screening.  No      Denies pain at this time

## 2021-03-19 NOTE — PATIENT INSTRUCTIONS
Take 150 mg of Trazodone until this med is filled, continue taking if helpful. Suvorexant (By mouth) Suvorexant (vmg-nlx-RCS-ant) Treats insomnia. Brand Name(s): Belsomra There may be other brand names for this medicine. When This Medicine Should Not Be Used: This medicine is not right for everyone. Do not use it if you have narcolepsy. How to Use This Medicine:  
Tablet · Your doctor will tell you how much medicine to use. Do not use more than directed. · You should not take this medicine if you are not able to sleep or rest for at least 7 hours before you need to be active again. · This medicine works better if you do not take it with food or right after a meal. 
· This medicine should come with a Medication Guide. Ask your pharmacist for a copy if you do not have one. · Missed dose: This medicine is not taken on a regular schedule. Use it only when you cannot sleep. · Store the medicine in a closed container at room temperature, away from heat, moisture, and direct light. Do not remove the tablets from the blister pack until you are ready to use them. Drugs and Foods to Avoid: Ask your doctor or pharmacist before using any other medicine, including over-the-counter medicines, vitamins, and herbal products. · Some foods and medicines can affect how suvorexant works. Tell your doctor if you are using any of the following: 
¨ Aprepitant, boceprevir, carbamazepine, ciprofloxacin, clarithromycin, conivaptan, digoxin, diltiazem, erythromycin, fluconazole, imatinib, nefazodone, phenytoin, rifampin, telaprevir, telithromycin, verapamil ¨ Medicine to treat HIV (such as amprenavir, atazanavir, fosamprenavir, indinavir, nelfinavir, ritonavir, saquinavir) or medicine to treat a fungal infection (such as itraconazole, ketoconazole, posaconazole) · Tell your doctor if you use anything else that makes you sleepy. Some examples are allergy medicine, narcotic pain medicine, and alcohol.  
· Do not eat grapefruit or drink grapefruit juice while you are using this medicine. Warnings While Using This Medicine: · Tell your doctor if you are pregnant or breastfeeding, or if you have liver disease, breathing or lung problems (such as COPD, sleep apnea), or muscle problems or weakness. Tell your doctor if you have a history of alcohol or drug addiction, depression, or mental illness. · This medicine may cause the following problems: ¨ Unusual changes in mood or behavior ¨ Sleep paralysis (while you are going to sleep or waking up) · This medicine may make you drowsy the next morning. Do not drive or do anything else that could be dangerous until you know how this medicine affects you. · This medicine may cause you to do things while you are still asleep, such as driving or eating. You may not remember doing these things the next morning. Tell your doctor right away if you learn that this has happened. · Call your doctor if you still have trouble sleeping after you take this medicine for 7 to 10 days. · This medicine can be habit-forming. Do not use more than your prescribed dose. Call your doctor if you think your medicine is not working. · Keep all medicine out of the reach of children. Never share your medicine with anyone. Possible Side Effects While Using This Medicine:  
Call your doctor right away if you notice any of these side effects: · Allergic reaction: Itching or hives, swelling in your face or hands, swelling or tingling in your mouth or throat, chest tightness, trouble breathing · Anxiety, depression, nervousness, unusual behavior, or thoughts of hurting yourself · Memory loss · Seeing, hearing, or feeling things that are not there · Severe confusion, drowsiness, muscle weakness · Temporary inability to move or talk while you are going to sleep or waking up If you notice these less serious side effects, talk with your doctor: · Daytime drowsiness If you notice other side effects that you think are caused by this medicine, tell your doctor. Call your doctor for medical advice about side effects. You may report side effects to FDA at 1-457-YSZ-7688 © 2017 [facility] Information is for End User's use only and may not be sold, redistributed or otherwise used for commercial purposes. The above information is an  only. It is not intended as medical advice for individual conditions or treatments. Talk to your doctor, nurse or pharmacist before following any medical regimen to see if it is safe and effective for you.

## 2021-03-29 ENCOUNTER — TELEPHONE (OUTPATIENT)
Dept: INTERNAL MEDICINE CLINIC | Age: 61
End: 2021-03-29

## 2021-04-23 ENCOUNTER — OFFICE VISIT (OUTPATIENT)
Dept: INTERNAL MEDICINE CLINIC | Age: 61
End: 2021-04-23
Payer: COMMERCIAL

## 2021-04-23 VITALS
DIASTOLIC BLOOD PRESSURE: 60 MMHG | HEIGHT: 66 IN | HEART RATE: 77 BPM | OXYGEN SATURATION: 98 % | TEMPERATURE: 98.3 F | RESPIRATION RATE: 18 BRPM | WEIGHT: 168 LBS | BODY MASS INDEX: 27 KG/M2 | SYSTOLIC BLOOD PRESSURE: 142 MMHG

## 2021-04-23 DIAGNOSIS — F51.01 PRIMARY INSOMNIA: Primary | ICD-10-CM

## 2021-04-23 DIAGNOSIS — G62.9 NEUROPATHY: ICD-10-CM

## 2021-04-23 PROCEDURE — 99213 OFFICE O/P EST LOW 20 MIN: CPT | Performed by: NURSE PRACTITIONER

## 2021-04-23 RX ORDER — ESZOPICLONE 1 MG/1
1 TABLET, FILM COATED ORAL
Qty: 30 TAB | Refills: 2 | Status: SHIPPED | OUTPATIENT
Start: 2021-04-23 | End: 2021-05-17 | Stop reason: SDUPTHER

## 2021-04-23 NOTE — PATIENT INSTRUCTIONS
Eszopiclone (By mouth) Eszopiclone (ab-ext-TCG-lone) Treats insomnia. Brand Name(s): KYMMedhatmarisa [employer] There may be other brand names for this medicine. When This Medicine Should Not Be Used: This medicine is not right for everyone. Do not use it if you had an allergic reaction to eszopiclone. How to Use This Medicine:  
Tablet · Take your medicine as directed. Your dose may need to be changed several times to find what works best for you. · Use this medicine only when you cannot sleep. You do not need to keep a schedule for taking it. · This medicine should not be taken with food or right after a heavy meal. 
· This medicine works quickly. Do not take it until right before you go to bed. · Do not take this medicine if you are not able to sleep for 7 to 8 hours before you need to be active again. · This medicine should come with a Medication Guide. Ask your pharmacist for a copy if you do not have one. · Store the medicine in a closed container at room temperature, away from heat, moisture, and direct light. Drugs and Foods to Avoid: Ask your doctor or pharmacist before using any other medicine, including over-the-counter medicines, vitamins, and herbal products. · Some foods and medicines can affect how eszopiclone works. Tell your doctor if you are using clarithromycin, itraconazole, ketoconazole, nefazodone, nelfinavir, rifampin, ritonavir, or troleandomycin. · Tell your doctor if you use anything else that makes you sleepy. Some examples are allergy medicine, narcotic pain medicine, and alcohol. · Do not take this medicine before bed if you drank alcohol during the evening. Warnings While Using This Medicine: · Tell your doctor if you are pregnant or breastfeeding, or if you have liver or lung disease. Also tell him if you have ever been addicted to alcohol or drugs, or if you have a history of depression or mental illness.  
· Tell your doctor if you develop any unusual thoughts or behaviors, such as aggression, confusion, hallucinations (seeing, hearing, or feeling things that are not there), anxiety, depression, or thoughts of hurting yourself. · You may still feel dizzy or drowsy the next day after you use this medicine. Do not drive or do anything that could be dangerous until you know how this medicine affects you. · This medicine may cause you to do things while you are still asleep that you may not remember the next morning, such as driving a car, having sex, or eating food. Tell your doctor right away if you learn that this has happened. · This medicine can be habit-forming. Do not use more than your prescribed dose. Call your doctor if you think your medicine is not working. · Call your doctor if you still have trouble sleeping after you take this medicine for 7 to 10 days. · Keep all medicine out of the reach of children. Never share your medicine with anyone. Possible Side Effects While Using This Medicine:  
Call your doctor right away if you notice any of these side effects: · Allergic reaction: Itching or hives, swelling in your face or hands, swelling or tingling in your mouth or throat, chest tightness, trouble breathing · Anxiety, aggression, confusion, depression, or thoughts of hurting yourself · Fever, chills, cough, sore throat, and body aches · Seeing, hearing, or feeling things that are not there If you notice these less serious side effects, talk with your doctor: · A bad taste in your mouth, or dry mouth · Daytime drowsiness or dizziness · Headache If you notice other side effects that you think are caused by this medicine, tell your doctor. Call your doctor for medical advice about side effects. You may report side effects to FDA at 5-267-FDA-7195 © 2017 [facility] Information is for End User's use only and may not be sold, redistributed or otherwise used for commercial purposes. The above information is an  only.  It is not intended as medical advice for individual conditions or treatments. Talk to your doctor, nurse or pharmacist before following any medical regimen to see if it is safe and effective for you.

## 2021-04-23 NOTE — PROGRESS NOTES
German Soni (: 1960) is a 63 y.o. female, established patient, here for evaluation of the following chief complaint(s):  Follow-up (insomnia, med change, zynex)       ASSESSMENT/PLAN:  Below is the assessment and plan developed based on review of pertinent history, physical exam, labs, studies, and medications. 1. Primary insomnia  -     eszopiclone (LUNESTA) 1 mg tablet; Take 1 Tab by mouth nightly. Max Daily Amount: 1 mg., Normal, Disp-30 Tab, R-2  2. Neuropathy      Return in about 3 weeks (around 2021) for VV-LUNESTA fu.      Pt asked to complete follow by next visit: med changed and advised pt call zynex rep for sock like device to help with neuropathy more. SUBJECTIVE/OBJECTIVE:  HPI    Pt presents to f/u Zynex start and Belsomra start. Taking trazodone since belsomra NOT approved. Denies side effects from medication. Feels about the same, foot a little better. Pain Scale: 0 - No pain/10. No acute complaints otherwise.       Review of Systems  Constitutional: negative for fevers, chills, anorexia and weight loss  Respiratory:  negative for cough, hemoptysis, dyspnea, and wheezing  CV:   negative for chest pain, palpitations, and lower extremity edema  GI:   negative for nausea, vomiting, diarrhea, abdominal pain, and melena  Endo:               negative for polyuria,polydipsia,polyphagia, and heat intolerance  Genitourinary: negative for frequency, urgency, dysuria, retention, and hematuria  Integument:  negative for rash, ulcerations, and pruritus  Hematologic:  negative for easy bruising and bleeding  Musculoskel: negative for arthralgias, muscle weakness,and joint pain/swelling  Neurological:  negative for headaches, dizziness, vertigo,and memory/gait problems  Behavl/Psych: negative for feelings of anxiety, depression, suicide, and mood changes    Visit Vitals  BP (!) 142/60 (BP 1 Location: Left upper arm, BP Patient Position: Sitting, BP Cuff Size: Large adult)   Pulse 77   Temp 98.3 °F (36.8 °C) (Oral)   Resp 18   Ht 5' 6\" (1.676 m)   Wt 168 lb (76.2 kg)   SpO2 98%   BMI 27.12 kg/m²       Wt Readings from Last 3 Encounters:   04/23/21 168 lb (76.2 kg)   03/19/21 168 lb (76.2 kg)   07/16/20 194 lb (88 kg)         Physical Exam:   General appearance - alert, well appearing, and in no distress. Mental status - A/O x 4,normal mood and affect. Chest -  Symmetric chest rise. No wheezing. No distress. Heart - Normal rate. Abdomen- Soft, round. Non-distended, NT. No pulsatile masses or hernias. Ext-  No pedal edema, clubbing, or cyanosis. Skin-Warm and dry. No hyperpigmentation, ulcerations, or suspicious lesions. Neuro - Normal speech, no focal findings or movement disorder. Normal strength, gait, and muscle tone. An electronic signature was used to authenticate this note.   -- Josr Goodwin NP

## 2021-04-23 NOTE — PROGRESS NOTES
Pt is here for   Chief Complaint   Patient presents with    Follow-up     insomnia, med change, zynex     1. Have you been to the ER, urgent care clinic since your last visit? Hospitalized since your last visit? No    2. Have you seen or consulted any other health care providers outside of the [address] [place] [address] since your last visit? Include any pap smears or colon screening.  No    Denies pain at this time

## 2021-04-28 RX ORDER — RESVER/WINE/BFL/GRPSD/PC/C/POM 200MG-60MG
CAPSULE ORAL
Qty: 90 TAB | Refills: 3 | Status: SHIPPED | OUTPATIENT
Start: 2021-04-28 | End: 2022-04-25 | Stop reason: SDUPTHER

## 2021-05-17 ENCOUNTER — VIRTUAL VISIT (OUTPATIENT)
Dept: INTERNAL MEDICINE CLINIC | Age: 61
End: 2021-05-17
Payer: COMMERCIAL

## 2021-05-17 DIAGNOSIS — F51.01 PRIMARY INSOMNIA: ICD-10-CM

## 2021-05-17 PROCEDURE — 99212 OFFICE O/P EST SF 10 MIN: CPT | Performed by: NURSE PRACTITIONER

## 2021-05-17 RX ORDER — ESZOPICLONE 2 MG/1
2 TABLET, FILM COATED ORAL
Qty: 30 TAB | Refills: 5 | Status: SHIPPED | OUTPATIENT
Start: 2021-05-17

## 2021-05-17 NOTE — PROGRESS NOTES
Jey Valderrama is a 63 y.o. female established patient, here for evaluation of the following chief complaint(s):   Follow-up (lunesta. . pt states that she's taking 2mg of this medication and the 2mg seems to help. Doxy. me [phone])          Assessment & Plan:   Diagnoses and all orders for this visit:    1. Primary insomnia  -     eszopiclone (LUNESTA) 2 mg tablet; Take 1 Tab by mouth nightly. Max Daily Amount: 2 mg. Follow-up and Dispositions    · Return for keep july appt as scheduled. .           We discussed the expected course, resolution and complications of the diagnosis(es) in detail. Medication risks, benefits, costs, interactions, and alternatives were discussed as indicated. I advised her to contact the office if her condition worsens, changes or fails to improve as anticipated. She expressed understanding with the diagnosis(es) and plan. Specific pt instructions until next visit: continue present plan    Subjective:   Jey Valderrama is a 63 y.o. female who was seen for Follow-up (Chas Ambriz. . pt states that she's taking 2mg of this medication and the 2mg seems to help. Doxy. me [phone])      Pt presents to f/u insomnia. Taking lunesta 2mg. Denies side effects from medication. Feels better, averaging 6+ hours, but awakens to urinate. Reports drinking lots of fluids throughout the day and goes to bed around 9 pm, falls back asleep without issue. No acute complaints otherwise. Continued trazodone every other night as pamphlet with Lunesta advised against nightly use and to only take as needed, so alternated for this reason only.        Patient Active Problem List    Diagnosis Date Noted    Luciano's esophagus without dysplasia 08/30/2019    Prediabetes 03/26/2019    BMI 32.0-32.9,adult 03/20/2018    Neuropathy 09/18/2017    History of breast cancer 09/18/2017    Insomnia 06/08/2017    History of hepatitis C 06/08/2017    Chronic back pain 04/18/2014     Current Outpatient Medications Medication Sig Dispense Refill    eszopiclone (LUNESTA) 2 mg tablet Take 1 Tab by mouth nightly. Max Daily Amount: 2 mg. 30 Tab 5    Vitamin D3 125 mcg (5,000 unit) tab tablet TAKE 1 TAB BY MOUTH DAILY. INDICATIONS: LOW VITAMIN D LEVELS 90 Tab 3    meloxicam (MOBIC) 7.5 mg tablet TAKE 1 TABLET BY MOUTH EVERY DAY 90 Tab 3    fluticasone propionate (FLONASE) 50 mcg/actuation nasal spray INSTILL 2 SPRAYS IN BOTH NOSTRILS EACH DAY 1 Bottle 3    VITAMIN B COMPLEX-100 PO Take  by mouth.  omeprazole (PRILOSEC) 40 mg capsule Take 40 mg by mouth daily.  diclofenac (VOLTAREN) 1 % gel APPLY 4 G TO AFFECTED AREA EVERY SIX (6) HOURS. 100 g 3    B infantis/B ani/B sami/B bifid (PROBIOTIC 4X PO) Take  by mouth.  levocetirizine (XYZAL) 5 mg tablet Take 1 Tab by mouth daily. 30 Tab 11    magnesium oxide (MAG-OX) 400 mg tablet Take 400 mg by mouth daily.  fiber cap Take 2 Caps by mouth daily.       PAZEO 0.7 % drop PLACE 1 DROP IN BOTH EYES DAILY  2     Allergies   Allergen Reactions    Pcn [Penicillins] Anaphylaxis     Past Medical History:   Diagnosis Date    Arthritis     Breast cancer [facility] [address] 2015    left breast lumpectomy-2015, radiation    Cancer ([facility] [address]     skin - leg    Chronic pain     GERD (gastroesophageal reflux disease)     Liver disease     hep-c    Radiation therapy complication 2619    left breast     Past Surgical History:   Procedure Laterality Date    BIOPSY LIVER      HX APPENDECTOMY  12/30/2014    Laparoscopic Appendectomy    HX BLADDER SUSPENSION      HX BREAST BIOPSY Left 2015    HX BREAST LUMPECTOMY Left 4/8/15    with radiation     HX CYST INCISION AND DRAINAGE Left 4/8/2015    INCISION AND DRAINAGE BREAST [facility] OR    HX GYN      hysterectomy    HX ORTHOPAEDIC      shoulder and heel spurs    HX OTHER SURGICAL      Teeth implants    PA BREAST SURGERY PROCEDURE UNLISTED  lymph node removed 4/8/15       Review of Systems Constitutional: Negative for fever and malaise/fatigue. Eyes: Negative for blurred vision. Respiratory: Negative for cough and shortness of breath. Cardiovascular: Negative for chest pain and leg swelling. Neurological: Negative for dizziness, weakness and headaches. Objective:   Vital Signs: (As obtained by patient/caregiver at home)  There were no vitals taken for this visit. Physical Exam:  General appearance - alert, well  appearing, and in no distress. Mental status - A/O x 4,normal mood and affect. Eyes- trace periorbital edema, drainage, or irritation noted. Nose- no obvious drainage or swelling. Throat- no obvious swelling, goiter, or notable lymphadenopathy  Chest - Symmetric chest rise. No wheezing or coughing. No distress. Skin- normal skin tone noted. No hyperpigmentation or obvious deformities. No diaphoresis noted. No flushing. Neuro - Normal speech, no focal findings or movement disorder. Other pertinent observable physical exam findings:-              Chely Torre is being evaluated by a Virtual Visit (video visit) encounter to address concerns as mentioned above. A caregiver was present when appropriate. Due to this being a TeleHealth encounter ([facility]), evaluation of the following organ systems was limited: Vitals/Constitutional/EENT/Resp/CV/GI//MS/Neuro/Skin/Heme-Lymph-Imm. Pursuant to the emergency declaration under the [address] [place] [address], [address] [place] [address] authority and the [employer] Act, this Virtual Visit was conducted with patient's (and/or legal guardian's) consent, to reduce the patient's risk of exposure to COVID-19 and provide necessary medical care.   The patient (and/or legal guardian) has also been advised to contact this office for worsening conditions or problems, and seek emergency medical treatment and/or call 911 if deemed necessary. Patient identification was verified at the start of the visit: YES    Services were provided through a video synchronous discussion virtually to substitute for in-person clinic visit. Patient and provider were located at their individual homes. An electronic signature was used to authenticate this note.   -- Aleksander Mendoza NP

## 2021-05-17 NOTE — PROGRESS NOTES
Pt is here for   Chief Complaint   Patient presents with    Follow-up     King Helder. . pt states that she's taking 2mg of this medication and the 2mg seems to help. Doxy. me [phone]     1. Have you been to the ER, urgent care clinic since your last visit? Hospitalized since your last visit? No    2. Have you seen or consulted any other health care providers outside of the [address] [place] [address] since your last visit? Include any pap smears or colon screening.  No    Denies pain at this time

## 2021-05-17 NOTE — PATIENT INSTRUCTIONS

## 2021-06-11 ENCOUNTER — TELEPHONE (OUTPATIENT)
Dept: INTERNAL MEDICINE CLINIC | Age: 61
End: 2021-06-11

## 2021-07-23 ENCOUNTER — OFFICE VISIT (OUTPATIENT)
Dept: INTERNAL MEDICINE CLINIC | Age: 61
End: 2021-07-23
Payer: COMMERCIAL

## 2021-07-23 VITALS
RESPIRATION RATE: 18 BRPM | TEMPERATURE: 97 F | OXYGEN SATURATION: 97 % | HEIGHT: 66 IN | HEART RATE: 70 BPM | DIASTOLIC BLOOD PRESSURE: 69 MMHG | SYSTOLIC BLOOD PRESSURE: 124 MMHG | BODY MASS INDEX: 26.2 KG/M2 | WEIGHT: 163 LBS

## 2021-07-23 DIAGNOSIS — Z00.00 ADULT GENERAL MEDICAL EXAMINATION: Primary | ICD-10-CM

## 2021-07-23 DIAGNOSIS — R73.03 PREDIABETES: ICD-10-CM

## 2021-07-23 DIAGNOSIS — G62.9 NEUROPATHY: ICD-10-CM

## 2021-07-23 DIAGNOSIS — Z85.3 HISTORY OF BREAST CANCER: ICD-10-CM

## 2021-07-23 DIAGNOSIS — K22.70 BARRETT'S ESOPHAGUS WITHOUT DYSPLASIA: ICD-10-CM

## 2021-07-23 DIAGNOSIS — Z86.19 HISTORY OF HEPATITIS C: ICD-10-CM

## 2021-07-23 DIAGNOSIS — Z71.89 ADVANCE CARE PLANNING: ICD-10-CM

## 2021-07-23 DIAGNOSIS — F51.01 PRIMARY INSOMNIA: ICD-10-CM

## 2021-07-23 DIAGNOSIS — Z13.220 SCREENING FOR LIPID DISORDERS: ICD-10-CM

## 2021-07-23 PROCEDURE — 99396 PREV VISIT EST AGE 40-64: CPT | Performed by: NURSE PRACTITIONER

## 2021-07-23 NOTE — PROGRESS NOTES
Gail Moore is a 63 y.o. female presenting for annual checkup. Specific concerns today: feels sleep med causes her to awaken and eats. Has since stopped for past 3 weeks, taking melatonin with trazodone 100 mg with good relief. ROS: Feeling well. No dyspnea or chest pain on exertion. No abdominal pain, change in bowel habits, black or bloody stools. Last colonoscopy 2 yrs ago, normal. Last BM: today, Wing#4. Averages 7 BMs every 7 days. Averages drinking 4 bottles of water daily. No urinary tract or gynecologic/prostatic symptoms, but urinates 1--2 times/hr. No neurological complaints. Review of Systems  Constitutional: negative for fevers, chills, anorexia and weight loss  Eyes:   negative for visual disturbance, drainage, and irritation  ENT:   +NC. negative for tinnitus,sore throat,ear pain,and hoarseness  Respiratory:  negative for cough, hemoptysis, dyspnea, and wheezing  CV:   negative for chest pain, palpitations, and lower extremity edema  GI:   negative for nausea, vomiting, diarrhea, abdominal pain, and melena  Endo:               +NIGHT Sweats.  negative for polydipsia,polyphagia  Genitourinary: negative for dysuria, retention, and hematuria  Integument:  negative for rash, ulcerations, and pruritus  Hematologic:  negative for easy bruising and bleeding  Musculoskel: negative for arthralgias, muscle weakness,and joint pain/swelling  Neurological:  negative for headaches, dizziness, vertigo,and memory/gait problems  Behavl/Psych: negative for feelings of anxiety, depression, suicide, and mood changes    Discussed ADVANCED DIRECTIVE:yes  Advanced Directive on File: no    Dental exam in past 12 months: yes  Eye exam in past 12-24 months: yes      Past Medical History:   Diagnosis Date    Arthritis     Breast cancer ([facility] [address] 2015    left breast lumpectomy-2015, radiation    Cancer ([facility] [address]     skin - leg    Chronic pain     GERD (gastroesophageal reflux disease)     Liver disease     hep-c    Radiation therapy complication 9385    left breast     Past Surgical History:   Procedure Laterality Date    BIOPSY LIVER      HX APPENDECTOMY  2014    Laparoscopic Appendectomy    HX BLADDER SUSPENSION      HX BREAST BIOPSY Left 2015    HX BREAST LUMPECTOMY Left 4/8/15    with radiation     HX CYST INCISION AND DRAINAGE Left 2015    INCISION AND DRAINAGE BREAST performed by Felisa [facility] at [facility] MAIN OR    HX GYN      hysterectomy    HX ORTHOPAEDIC      shoulder and heel spurs    HX OTHER SURGICAL      Teeth implants    MA BREAST SURGERY PROCEDURE UNLISTED  lymph node removed 4/8/15     Social History     Socioeconomic History    Marital status:      Spouse name: Not on file    Number of children: Not on file    Years of education: Not on file    Highest education level: Not on file   Tobacco Use    Smoking status: Former Smoker     Packs/day: 1.00     Years: 40.00     Pack years: 40.00     Types: Cigarettes     Quit date: 4/15/2015     Years since quittin.2    Smokeless tobacco: Never Used    Tobacco comment: using vapor e-cigaretts   Vaping Use    Vaping Use: Never used   Substance and Sexual Activity    Alcohol use: Yes     Comment: occasional    Drug use: No    Sexual activity: Not Currently     Social Determinants of Health     Financial Resource Strain:     Difficulty of Paying Living Expenses:    Food Insecurity:     Worried About Running Out of Food in the Last Year:     Ran Out of Food in the Last Year:    Transportation Needs:     Lack of Transportation (Medical):      Lack of Transportation (Non-Medical):    Physical Activity:     Days of Exercise per Week:     Minutes of Exercise per Session:    Stress:     Feeling of Stress :    Social Connections:     Frequency of Communication with Friends and Family:     Frequency of Social Gatherings with Friends and Family:     Attends [employer] Services:     Active Member of Clubs or Organizations:     Attends Club or Organization Meetings:     Marital Status:      Family History   Problem Relation Age of Onset    Diabetes Mother     Heart Disease Father     Hypertension Father     Diabetes Father      Current Outpatient Medications   Medication Sig Dispense Refill    Vitamin D3 125 mcg (5,000 unit) tab tablet TAKE 1 TAB BY MOUTH DAILY. INDICATIONS: LOW VITAMIN D LEVELS 90 Tab 3    meloxicam (MOBIC) 7.5 mg tablet TAKE 1 TABLET BY MOUTH EVERY DAY 90 Tab 3    fluticasone propionate (FLONASE) 50 mcg/actuation nasal spray INSTILL 2 SPRAYS IN BOTH NOSTRILS EACH DAY 1 Bottle 3    VITAMIN B COMPLEX-100 PO Take  by mouth.  omeprazole (PRILOSEC) 40 mg capsule Take 40 mg by mouth daily.  diclofenac (VOLTAREN) 1 % gel APPLY 4 G TO AFFECTED AREA EVERY SIX (6) HOURS. 100 g 3    PAZEO 0.7 % drop PLACE 1 DROP IN BOTH EYES DAILY  2    B infantis/B ani/B sami/B bifid (PROBIOTIC 4X PO) Take  by mouth.  levocetirizine (XYZAL) 5 mg tablet Take 1 Tab by mouth daily. 30 Tab 11    magnesium oxide (MAG-OX) 400 mg tablet Take 400 mg by mouth daily.  fiber cap Take 2 Caps by mouth daily.  eszopiclone (LUNESTA) 2 mg tablet Take 1 Tab by mouth nightly. Max Daily Amount: 2 mg. (Patient not taking: Reported on 7/23/2021) 30 Tab 5     Allergies   Allergen Reactions    Pcn [Penicillins] Anaphylaxis       Objective:  Visit Vitals  /69 (BP 1 Location: Left upper arm, BP Patient Position: Sitting, BP Cuff Size: Large adult)   Pulse 70   Temp 97 °F (36.1 °C) (Oral)   Resp 18   Ht 5' 6\" (1.676 m)   Wt 163 lb (73.9 kg)   SpO2 97%   BMI 26.31 kg/m²     Wt Readings from Last 3 Encounters:   07/23/21 163 lb (73.9 kg)   04/23/21 168 lb (76.2 kg)   03/19/21 168 lb (76.2 kg)     Physical Exam:   General appearance - alert, well appearing, and in no distress. Mental status - A/O x 4, normal mood and affect. Head/Eyes- AT/NC. PHILLY, EOMI, corneas normal, no foreign bodies.   Ears- TM injected bilaterally, no erythema or drainage. Nose- Septum midline, pink mucosa. Turbinates normal, no polyps or erythema. No sinus tenderness. Mouth/Throat - mucous membranes moist, pharynx normal without lesions. No tonsillar swelling or exudates. Neck -Supple ,normal CSP. FROM, non-tender. No adenopathy/thyromegaly. No JVD. Chest - CTA. Symmetric chest rise. No wheezing, rales or rhonchi. Heart - Normal rate, regular rhythm. Normal S1, S2. No MGR. Abdomen - Soft,non-distended. Normoactive BS in all quadrants. NT, no mass, rebound, or HSM   Ext- Radial, DP pulses, 2+ bilaterally. No pedal edema, clubbing, or cyanosis. Skin- Normal for ethnicity, warm, and dry. No hyperpigmentation, ulcerations, or suspicious lesions  Neuro - Normal speech, no focal findings. Normal strength and muscle tone. Coordination and gait normal.      Assessment/Plan:  ANNUAL labs. Medication Side Effects and Warnings were discussed with patient: yes   Patient Labs were reviewed: yes  Patient Past Records were reviewed: yes  See orders below  Follow-up and Dispositions    · Return in about 1 year (around 7/23/2022) for Annual with labs. ICD-10-CM ICD-9-CM    1. Adult general medical examination  Z00.00 V70.9 REFERRAL TO [facility]      METABOLIC PANEL, COMPREHENSIVE      CBC W/O DIFF      LIPID PANEL   2. Advance care planning  Z71.89 V65.49 REFERRAL TO [facility]   3. History of hepatitis C  G72.43 I14.97 METABOLIC PANEL, COMPREHENSIVE   4. Luciano's esophagus without dysplasia  K22.70 530.85    5. Prediabetes  R73.03 790.29    6. Neuropathy  G62.9 355.9    7. Primary insomnia  F51.01 307.42    8. History of breast cancer  E49.9 I58.7 METABOLIC PANEL, COMPREHENSIVE   9.  Screening for lipid disorders  Z13.220 V77.91 LIPID PANEL     Orders Placed This Encounter    METABOLIC PANEL, COMPREHENSIVE    CBC W/O DIFF    LIPID PANEL    REFERRAL TO [facility]     Referral Priority:   Routine     Referral Type: Other     Referral Reason:   Specialty Services Required     Number of Visits Requested:   1         [address] expressed understanding of plan. An After Visit Summary was offered/printed and given to the patient.

## 2021-07-23 NOTE — PATIENT INSTRUCTIONS
Try Vitamin E, Black cohosh, or Estroven to help with your hot flashes. Heart-Healthy Diet: Care Instructions  Your Care Instructions     A heart-healthy diet has lots of vegetables, fruits, nuts, beans, and whole grains, and is low in salt. It limits foods that are high in saturated fat, such as meats, cheeses, and fried foods. It may be hard to change your diet, but even small changes can lower your risk of heart attack and heart disease. Follow-up care is a key part of your treatment and safety. Be sure to make and go to all appointments, and call your doctor if you are having problems. It's also a good idea to know your test results and keep a list of the medicines you take. How can you care for yourself at home? Watch your portions  · Use food labels to learn what the recommended servings are for the foods you eat. · Eat only the number of calories you need to stay at a healthy weight. If you need to lose weight, eat fewer calories than your body burns (through exercise and other physical activity). Eat more fruits and vegetables  · Eat a variety of fruit and vegetables every day. Dark green, deep orange, red, or yellow fruits and vegetables are especially good for you. Examples include spinach, carrots, peaches, and berries. · Keep carrots, celery, and other veggies handy for snacks. Buy fruit that is in season and store it where you can see it so that you will be tempted to eat it. · Cook dishes that have a lot of veggies in them, such as stir-fries and soups. Limit saturated fat  · Read food labels, and try to avoid saturated fats. They increase your risk of heart disease. · Use olive or canola oil when you cook. · Bake, broil, grill, or steam foods instead of frying them. · Choose lean meats instead of high-fat meats such as hot dogs and sausages. Cut off all visible fat when you prepare meat.   · Eat fish, skinless poultry, and meat alternatives such as soy products instead of high-fat meats. Soy products, such as tofu, may be especially good for your heart. · Choose low-fat or fat-free milk and dairy products. Eat foods high in fiber  · Eat a variety of grain products every day. Include whole-grain foods that have lots of fiber and nutrients. Examples of whole-grain foods include oats, whole wheat bread, and brown rice. · Buy whole-grain breads and cereals, instead of white bread or pastries. Limit salt and sodium  · Limit how much salt and sodium you eat to help lower your blood pressure. · Taste food before you salt it. Add only a little salt when you think you need it. With time, your taste buds will adjust to less salt. · Eat fewer snack items, fast foods, and other high-salt, processed foods. Check food labels for the amount of sodium in packaged foods. · Choose low-sodium versions of canned goods (such as soups, vegetables, and beans). Limit sugar  · Limit drinks and foods with added sugar. These include candy, desserts, and soda pop. Limit alcohol  · Limit alcohol to no more than 2 drinks a day for men and 1 drink a day for women. Too much alcohol can cause health problems. When should you call for help? Watch closely for changes in your health, and be sure to contact your doctor if:    · You would like help planning heart-healthy meals. Where can you learn more? Go to http://www.blackwood.com/  Enter V137 in the search box to learn more about \"Heart-Healthy Diet: Care Instructions. \"  Current as of: December 17, 2020               Content Version: 12.8  © 7275-2545 Health[employer], [employer]. Care instructions adapted under license by [employer] (which disclaims liability or warranty for this information). If you have questions about a medical condition or this instruction, always ask your healthcare professional. [place] [address] any warranty or liability for your use of this information.

## 2021-07-23 NOTE — PROGRESS NOTES
Pt is here for   Chief Complaint   Patient presents with    Complete Physical     with labs      Denies pain at this time      1. Have you been to the ER, urgent care clinic since your last visit? Hospitalized since your last visit? No    2. Have you seen or consulted any other health care providers outside of the [address] [place] [address] since your last visit? Include any pap smears or colon screening.  No

## 2021-07-23 NOTE — ACP (ADVANCE CARE PLANNING)
Advanced care planning- discussed Advance directive, Medical POA, and life sustaining options. Advised of free virtual or in-person visit for advance care planning paperwork completion with ACP specialist. Referreal sent. Advance Care Planning (ACP) Provider Conversation Snapshot    Date of ACP Conversation: 07/23/21  Persons included in Conversation:  Patient/family  Length of ACP Conversation in minutes:  5-10 minutes    Authorized Decision Maker (if patient is incapable of making informed decisions): This person is:   Healthcare Agent/Medical Power of  under Advance Directive        For Patients with Decision Making Capacity:   Intubation, CPR, use of IVF/Nutrition, Tube Feedings, and organ donation options reviewed briefly    Conversation Outcomes / Follow-Up Plan:   Recommended completion of Advance Directive form after review of ACP materials and conversation with prospective healthcare agent     Referral made for ACP follow-up assistance to:  ACP facilitator/specialist    ====Advance Care Planning Invitation====    Patient was invited to begin or continue Advance Care Planning on this date and reviewed ACP materials in the office OR discussed in detail during virtual visit. Recommended appointment with a [employer]  facilitator for ACP conversation regarding advance directives. [x] Yes  [] No  Referral sent to [employer] ACP team member or Coordinator for follow-up    [] Yes  [x] No  Patient scheduled an appointment.        Site of Referral: [place] [address]

## 2021-07-24 LAB
ALBUMIN SERPL-MCNC: 4.3 G/DL (ref 3.8–4.8)
ALBUMIN/GLOB SERPL: 1.7 {RATIO} (ref 1.2–2.2)
ALP SERPL-CCNC: 81 IU/L (ref 48–121)
ALT SERPL-CCNC: 16 IU/L (ref 0–32)
AST SERPL-CCNC: 19 IU/L (ref 0–40)
BILIRUB SERPL-MCNC: 0.5 MG/DL (ref 0–1.2)
BUN SERPL-MCNC: 19 MG/DL (ref 8–27)
BUN/CREAT SERPL: 27 (ref 12–28)
CALCIUM SERPL-MCNC: 9.4 MG/DL (ref 8.7–10.3)
CHLORIDE SERPL-SCNC: 102 MMOL/L (ref 96–106)
CHOLEST SERPL-MCNC: 183 MG/DL (ref 100–199)
CO2 SERPL-SCNC: 28 MMOL/L (ref 20–29)
CREAT SERPL-MCNC: 0.71 MG/DL (ref 0.57–1)
ERYTHROCYTE [DISTWIDTH] IN BLOOD BY AUTOMATED COUNT: 12.2 % (ref 11.7–15.4)
GLOBULIN SER CALC-MCNC: 2.6 G/DL (ref 1.5–4.5)
GLUCOSE SERPL-MCNC: 100 MG/DL (ref 65–99)
HCT VFR BLD AUTO: 46.3 % (ref 34–46.6)
HDLC SERPL-MCNC: 57 MG/DL
HGB BLD-MCNC: 15.6 G/DL (ref 11.1–15.9)
IMP & REVIEW OF LAB RESULTS: NORMAL
LDLC SERPL CALC-MCNC: 107 MG/DL (ref 0–99)
MCH RBC QN AUTO: 31.3 PG (ref 26.6–33)
MCHC RBC AUTO-ENTMCNC: 33.7 G/DL (ref 31.5–35.7)
MCV RBC AUTO: 93 FL (ref 79–97)
PLATELET # BLD AUTO: 308 X10E3/UL (ref 150–450)
POTASSIUM SERPL-SCNC: 4.1 MMOL/L (ref 3.5–5.2)
PROT SERPL-MCNC: 6.9 G/DL (ref 6–8.5)
RBC # BLD AUTO: 4.99 X10E6/UL (ref 3.77–5.28)
SODIUM SERPL-SCNC: 141 MMOL/L (ref 134–144)
TRIGL SERPL-MCNC: 108 MG/DL (ref 0–149)
VLDLC SERPL CALC-MCNC: 19 MG/DL (ref 5–40)
WBC # BLD AUTO: 9.1 X10E3/UL (ref 3.4–10.8)

## 2021-07-24 NOTE — PROGRESS NOTES
NML/Stable labs, no changes. We can discuss at the next OV, if pt would like.        Thanks, [employer], [employer].

## 2021-07-26 ENCOUNTER — PATIENT OUTREACH (OUTPATIENT)
Dept: CASE MANAGEMENT | Age: 61
End: 2021-07-26

## 2021-07-26 NOTE — ACP (ADVANCE CARE PLANNING)
Advance Care Planning   Ambulatory ACP Specialist Patient Outreach    Date:  7/26/2021    ACP Specialist:  Justin Hudson LPN    Outreach call to patient in follow-up to ACP Specialist referral from:    [x] PCP  [] Provider   [] Ambulatory Care Management [] Other     For:                  [] Continued Conversation for ACP decision making / Goals of Care             [] Code Status Discussion             [] Completion of Adv Directive             [] Completion of Portable DNR order             [x] Other (Specify)    Date Referral Received:723/21    Today's Outreach:  [x] First   [] Second  [] Third       Third outreach made by: [] Phone  [] Email / mail    [] MyChart     Intervention:  [x] Spoke with Patient   [] Left VM requesting return call      Outcome:  Spoke with pt who wishes to move forward in completing an AMD. Appt with ACP specialist is scheduled for 8/5/21 at 11AM. ACP documents have been e-mailed to pt for review prior to appt. Next Step:   [x] ACP scheduled conversation  [] Outreach again in one week               [x] Email / Mail ACP Info Sheets  [] Email / Mail Advance Directive   [] Closing referral.  Routing closure to referring provider/staff and to ACP Specialist . [] Closure letter mailed to patient with invitation to contact ACP Specialist if / when ready.   Thank you for this referral.

## 2021-07-28 RX ORDER — TRAZODONE HYDROCHLORIDE 50 MG/1
TABLET ORAL
Qty: 180 TABLET | Refills: 3 | Status: SHIPPED | OUTPATIENT
Start: 2021-07-28 | End: 2022-07-28

## 2021-08-05 ENCOUNTER — DOCUMENTATION ONLY (OUTPATIENT)
Dept: CASE MANAGEMENT | Age: 61
End: 2021-08-05

## 2021-08-05 NOTE — ACP (ADVANCE CARE PLANNING)
Advance Care Planning   Ambulatory ACP Specialist Patient Outreach    Date:  8/5/2021    ACP Specialist:  Zakia Laboy LCSW    Outreach call to patient in follow-up to ACP Specialist referral from:    [x] PCP  [] Provider   [] Ambulatory Care Management [] Other     For:                  [x] Advance Directive Assistance              [] Complete Portable DNR order              [] Complete POST/MOST              [] Code Status Discussion             [] Discuss Goals of Care             [] Early ACP Decision-Making              [] Other (Specify)    Date Referral Received:7/23/2021    Today's Outreach:  [] First   [x] Second  [] Third       Third outreach made by: [x] Phone  [] Email / mail    [] MyChart     Intervention:  [] Spoke with Patient   [x] Left VM requesting return call      Outcome: Pt was not available for appt scheduled for today. Left message, and texted a message on mobile phone for a return call to reschedule. Pt called stated she did not received the ACP materials or deleted the email by mistake. She asked for this information to be mailed to her. She will call when ready to schedule appt. Next Step:   [] ACP scheduled conversation  [x] Outreach again in one week               [x] Email / Mail ACP Info Sheets  [] Email / Mail Advance Directive   [] Closing referral.  Routing closure to referring provider/staff and to ACP Specialist . [x] Closure letter mailed to patient with invitation to contact ACP Specialist if / when ready.   Thank you for this referral.  Zakia Laboy LCSW

## 2022-02-15 ENCOUNTER — TRANSCRIBE ORDER (OUTPATIENT)
Dept: SCHEDULING | Age: 62
End: 2022-02-15

## 2022-02-15 DIAGNOSIS — Z12.31 VISIT FOR SCREENING MAMMOGRAM: Primary | ICD-10-CM

## 2022-03-18 RX ORDER — MELOXICAM 7.5 MG/1
TABLET ORAL
Qty: 90 TABLET | Refills: 3 | Status: SHIPPED | OUTPATIENT
Start: 2022-03-18

## 2022-03-19 PROBLEM — Z86.19 HISTORY OF HEPATITIS C: Status: ACTIVE | Noted: 2017-06-08

## 2022-03-19 PROBLEM — K22.70 BARRETT'S ESOPHAGUS WITHOUT DYSPLASIA: Status: ACTIVE | Noted: 2019-08-30

## 2022-03-19 PROBLEM — Z85.3 HISTORY OF BREAST CANCER: Status: ACTIVE | Noted: 2017-09-18

## 2022-03-19 PROBLEM — G62.9 NEUROPATHY: Status: ACTIVE | Noted: 2017-09-18

## 2022-03-19 PROBLEM — G47.00 INSOMNIA: Status: ACTIVE | Noted: 2017-06-08

## 2022-03-20 PROBLEM — R73.03 PREDIABETES: Status: ACTIVE | Noted: 2019-03-26

## 2022-04-11 ENCOUNTER — HOSPITAL ENCOUNTER (OUTPATIENT)
Dept: MAMMOGRAPHY | Age: 62
Discharge: HOME | End: 2022-04-11
Attending: INTERNAL MEDICINE
Payer: COMMERCIAL

## 2022-04-11 DIAGNOSIS — Z12.31 VISIT FOR SCREENING MAMMOGRAM: ICD-10-CM

## 2022-04-11 PROCEDURE — 77063 BREAST TOMOSYNTHESIS BI: CPT

## 2022-04-25 RX ORDER — CHOLECALCIFEROL TAB 125 MCG (5000 UNIT) 125 MCG
TAB ORAL
Qty: 90 TABLET | Refills: 3 | Status: SHIPPED | OUTPATIENT
Start: 2022-04-25

## 2022-07-28 RX ORDER — TRAZODONE HYDROCHLORIDE 50 MG/1
TABLET ORAL
Qty: 180 TABLET | Refills: 3 | Status: SHIPPED | OUTPATIENT
Start: 2022-07-28

## 2022-09-27 ENCOUNTER — OFFICE VISIT (OUTPATIENT)
Dept: INTERNAL MEDICINE CLINIC | Age: 62
End: 2022-09-27
Payer: COMMERCIAL

## 2022-09-27 VITALS
HEART RATE: 64 BPM | SYSTOLIC BLOOD PRESSURE: 139 MMHG | DIASTOLIC BLOOD PRESSURE: 84 MMHG | WEIGHT: 178 LBS | HEIGHT: 66 IN | BODY MASS INDEX: 28.61 KG/M2 | OXYGEN SATURATION: 99 % | TEMPERATURE: 97.5 F | RESPIRATION RATE: 18 BRPM

## 2022-09-27 DIAGNOSIS — Z13.29 SCREENING FOR ENDOCRINE, NUTRITIONAL, METABOLIC AND IMMUNITY DISORDER: ICD-10-CM

## 2022-09-27 DIAGNOSIS — Z00.00 ADULT GENERAL MEDICAL EXAMINATION: Primary | ICD-10-CM

## 2022-09-27 DIAGNOSIS — Z13.220 SCREENING FOR LIPID DISORDERS: ICD-10-CM

## 2022-09-27 DIAGNOSIS — R73.03 PREDIABETES: ICD-10-CM

## 2022-09-27 DIAGNOSIS — Z13.0 SCREENING FOR ENDOCRINE, NUTRITIONAL, METABOLIC AND IMMUNITY DISORDER: ICD-10-CM

## 2022-09-27 DIAGNOSIS — Z85.3 HISTORY OF BREAST CANCER: ICD-10-CM

## 2022-09-27 DIAGNOSIS — Z13.21 SCREENING FOR ENDOCRINE, NUTRITIONAL, METABOLIC AND IMMUNITY DISORDER: ICD-10-CM

## 2022-09-27 DIAGNOSIS — Z23 NEEDS FLU SHOT: ICD-10-CM

## 2022-09-27 DIAGNOSIS — F17.210 CIGARETTE SMOKER: ICD-10-CM

## 2022-09-27 DIAGNOSIS — Z71.89 ADVANCE CARE PLANNING: ICD-10-CM

## 2022-09-27 DIAGNOSIS — Z13.228 SCREENING FOR ENDOCRINE, NUTRITIONAL, METABOLIC AND IMMUNITY DISORDER: ICD-10-CM

## 2022-09-27 PROCEDURE — 99396 PREV VISIT EST AGE 40-64: CPT | Performed by: NURSE PRACTITIONER

## 2022-09-27 PROCEDURE — 90686 IIV4 VACC NO PRSV 0.5 ML IM: CPT | Performed by: INTERNAL MEDICINE

## 2022-09-27 PROCEDURE — 90471 IMMUNIZATION ADMIN: CPT | Performed by: INTERNAL MEDICINE

## 2022-09-27 NOTE — PROGRESS NOTES
Pt is here for   Chief Complaint   Patient presents with    Physical     With labs      1. Have you been to the ER, urgent care clinic since your last visit? Hospitalized since your last visit? No    2. Have you seen or consulted any other health care providers outside of the [address] [place] [address] since your last visit? Include any pap smears or colon screening. No    Denies pain at this time     Lui Jauregui is a 57 y.o. female who presents for routine immunizations. She denies any symptoms , reactions or allergies that would exclude them from being immunized today. Risks and adverse reactions were discussed and the VIS was given to them. All questions were addressed. She was observed for 15 min post injection. There were no reactions observed. Eda Duran LPN

## 2022-09-27 NOTE — PATIENT INSTRUCTIONS
Vaccine Information Statement    Influenza (Flu) Vaccine (Inactivated or Recombinant): What You Need to Know    Many vaccine information statements are available in Croatian and other languages. See www.immunize.org/vis. Hojas de información sobre vacunas están disponibles en español y en muchos otros idiomas. Visite www.immunize.org/vis. 1. Why get vaccinated? Influenza vaccine can prevent influenza (flu). Flu is a contagious disease that spreads around the [facility] every year, usually between October and May. Anyone can get the flu, but it is more dangerous for some people. Infants and young children, people 72 years and older, pregnant people, and people with certain health conditions or a weakened immune system are at greatest risk of flu complications. Pneumonia, bronchitis, sinus infections, and ear infections are examples of flu-related complications. If you have a medical condition, such as heart disease, cancer, or diabetes, flu can make it worse. Flu can cause fever and chills, sore throat, muscle aches, fatigue, cough, headache, and runny or stuffy nose. Some people may have vomiting and diarrhea, though this is more common in children than adults. In an average year, thousands of people in the [facility] die from flu, and many more are hospitalized. Flu vaccine prevents millions of illnesses and flu-related visits to the doctor each year. 2. Influenza vaccines     CDC recommends everyone 6 months and older get vaccinated every flu season. Children 6 months through 6years of age may need 2 doses during a single flu season. Everyone else needs only 1 dose each flu season. It takes about 2 weeks for protection to develop after vaccination. There are many flu viruses, and they are always changing. Each year a new flu vaccine is made to protect against the influenza viruses believed to be likely to cause disease in the upcoming flu season.  Even when the vaccine doesnt exactly match these viruses, it may still provide some protection. Influenza vaccine does not cause flu. Influenza vaccine may be given at the same time as other vaccines. 3. Talk with your health care provider    Tell your vaccination provider if the person getting the vaccine:  Has had an allergic reaction after a previous dose of influenza vaccine, or has any severe, life-threatening allergies   Has ever had Guillain-Barré Syndrome (also called GBS)    In some cases, your health care provider may decide to postpone influenza vaccination until a future visit. Influenza vaccine can be administered at any time during pregnancy. People who are or will be pregnant during influenza season should receive inactivated influenza vaccine. People with minor illnesses, such as a cold, may be vaccinated. People who are moderately or severely ill should usually wait until they recover before getting influenza vaccine. Your health care provider can give you more information. 4. Risks of a vaccine reaction    Soreness, redness, and swelling where the shot is given, fever, muscle aches, and headache can happen after influenza vaccination. There may be a very small increased risk of Guillain-Barré Syndrome (GBS) after inactivated influenza vaccine (the flu shot). Allegra Flow children who get the flu shot along with pneumococcal vaccine (PCV13) and/or DTaP vaccine at the same time might be slightly more likely to have a seizure caused by fever. Tell your health care provider if a child who is getting flu vaccine has ever had a seizure. People sometimes faint after medical procedures, including vaccination. Tell your provider if you feel dizzy or have vision changes or ringing in the ears. As with any medicine, there is a very remote chance of a vaccine causing a severe allergic reaction, other serious injury, or death. 5. What if there is a serious problem?     An allergic reaction could occur after the vaccinated person leaves the clinic. If you see signs of a severe allergic reaction (hives, swelling of the face and throat, difficulty breathing, a fast heartbeat, dizziness, or weakness), call 9-1-1 and get the person to the nearest hospital.    For other signs that concern you, call your health care provider. Adverse reactions should be reported to the Vaccine Adverse Event Reporting System (VAERS). Your health care provider will usually file this report, or you can do it yourself. Visit the VAERS website at www.vaers. [facility].gov or call 2-132.426.1359. VAERS is only for reporting reactions, and VAERS staff members do not give medical advice. 6. The National Vaccine Injury Compensation Program    The [facility] Vaccine Injury Compensation Program (VICP) is a federal program that was created to compensate people who may have been injured by certain vaccines. Claims regarding alleged injury or death due to vaccination have a time limit for filing, which may be as short as two years. Visit the VICP website at www.[facility].gov/vaccinecompensation or call 3-628.472.5958 to learn about the program and about filing a claim. 7. How can I learn more? Ask your health care provider. Call your local or state health department. Visit the website of the Food and Drug Administration (FDA) for vaccine package inserts and additional information at www.fda.gov/vaccines-blood-biologics/vaccines. Contact the Centers for Disease Control and Prevention (CDC): Call 1-814.727.8713 (5-698-HWH-INFO) or  Visit CDCs influenza website at www.cdc.gov/flu. Vaccine Information Statement   Inactivated Influenza Vaccine   8/6/2021  42 DANIELITO Chatterjee [id number]   Department of Health and Human Services  Centers for Disease Control and Prevention    Office Use Only

## 2022-09-27 NOTE — PROGRESS NOTES
Su Shen is a 57 y.o. female presenting for annual checkup. Specific concerns today: increased urinary frequency, but reports coffee and beer intake. Increased intake following recent correction. Associated with urgency. H/o bladder sling ~ 6 years ago. Will having left hand surgery, has appt today to be seen for surgery date. Pain Scale: 0 - No pain/10. Continues with pain and limited mobility, but right handed. ROS: Feeling well. No dyspnea or chest pain on exertion. No abdominal pain, change in bowel habits, black or bloody stools. Last BM: today, Hill#4. Averages 7 BMs every 7 days. Averages drinking 4-5 bottles of water daily. No gynecologic/prostatic symptoms, s/p hysterectomy. No neurological complaints. Review of Systems  Constitutional: negative for fevers, chills, anorexia and weight loss  Eyes:   negative for visual disturbance, drainage, and irritation  ENT:   +NC.  negative for tinnitus,sore throat,ear pain,and hoarseness  Respiratory:  negative for cough, hemoptysis, dyspnea, and wheezing  CV:   negative for chest pain, palpitations, and lower extremity edema  GI:   negative for nausea, vomiting, diarrhea, abdominal pain, and melena  Endo:               negative for polydipsia,polyphagia, and heat intolerance  Genitourinary: negative for  dysuria, retention, and hematuria  Integument:  negative for rash, ulcerations, and pruritus  Hematologic:  negative for easy bruising and bleeding  Musculoskel: negative for arthralgias, muscle weakness,and joint pain/swelling  Neurological:  negative for headaches, dizziness, vertigo,and memory/gait problems  Behavl/Psych: negative for feelings of anxiety, depression, suicide, and mood changes  3 most recent PHQ Screens 9/27/2022   Little interest or pleasure in doing things Not at all   Feeling down, depressed, irritable, or hopeless Not at all   Total Score PHQ 2 0         Discussed ADVANCED DIRECTIVE:yes  Advanced Directive on File: not yet    Dental exam in past 12 months: yes  Eye exam in past 12-24 months: not yet    Sleep: Averages 6-7 hours, + snoring, no h/o sleep apnea    Past Medical History:   Diagnosis Date    Arthritis     Breast cancer ([facility] [address] 2015    left breast lumpectomy-, radiation    Cancer ([facility] [address]     skin - leg    Chronic pain     GERD (gastroesophageal reflux disease)     Liver disease     hep-c    Radiation therapy complication 7438    left breast     Past Surgical History:   Procedure Laterality Date    BIOPSY LIVER      HX APPENDECTOMY  2014    Laparoscopic Appendectomy    HX BLADDER SUSPENSION      HX BREAST BIOPSY Left 2015    HX BREAST LUMPECTOMY Left 4/8/15    lumpectomy  with  left breast    HX CYST INCISION AND DRAINAGE Left 2015    INCISION AND DRAINAGE BREAST performed by Ravinder Salomon MD at [facility] MAIN OR    HX GYN      hysterectomy    HX ORTHOPAEDIC      shoulder and heel spurs    HX OTHER SURGICAL      Teeth implants    MA BREAST SURGERY PROCEDURE UNLISTED  lymph node removed 4/8/15     Social History     Socioeconomic History    Marital status:    Tobacco Use    Smoking status: Former     Packs/day: 1.00     Years: 40.00     Pack years: 40.00     Types: Cigarettes     Quit date: 4/15/2015     Years since quittin.4    Smokeless tobacco: Never    Tobacco comments:     using vapor e-cigaretts   Vaping Use    Vaping Use: Never used   Substance and Sexual Activity    Alcohol use: Yes     Comment: occasional    Drug use: No    Sexual activity: Not Currently     Family History   Problem Relation Age of Onset    Diabetes Mother     Heart Disease Father     Hypertension Father     Diabetes Father      Current Outpatient Medications   Medication Sig Dispense Refill    traZODone (DESYREL) 50 mg tablet TAKE 1 TO 2 TABLETS EVERY NIGHT FOR SLEEP. 180 Tablet 3    cholecalciferol (Vitamin D3) (5000 Units/125 mcg) tab tablet TAKE 1 TAB BY MOUTH DAILY.  INDICATIONS: LOW VITAMIN D LEVELS 90 Tablet 3 meloxicam (MOBIC) 7.5 mg tablet TAKE 1 TABLET BY MOUTH EVERY DAY 90 Tablet 3    fluticasone propionate (FLONASE) 50 mcg/actuation nasal spray INSTILL 2 SPRAYS IN BOTH NOSTRILS EACH DAY 1 Bottle 3    VITAMIN B COMPLEX-100 PO Take  by mouth. omeprazole (PRILOSEC) 40 mg capsule Take 40 mg by mouth daily. diclofenac (VOLTAREN) 1 % gel APPLY 4 G TO AFFECTED AREA EVERY SIX (6) HOURS. 100 g 3    B infantis/B ani/B sami/B bifid (PROBIOTIC 4X PO) Take  by mouth.      levocetirizine (XYZAL) 5 mg tablet Take 1 Tab by mouth daily. 30 Tab 11    magnesium oxide (MAG-OX) 400 mg tablet Take 400 mg by mouth daily. fiber cap Take 2 Caps by mouth daily. eszopiclone (LUNESTA) 2 mg tablet Take 1 Tab by mouth nightly. Max Daily Amount: 2 mg. (Patient not taking: No sig reported) 30 Tab 5    PAZEO 0.7 % drop PLACE 1 DROP IN BOTH EYES DAILY (Patient not taking: Reported on 9/27/2022)  2     Allergies   Allergen Reactions    Pcn [Penicillins] Anaphylaxis       Objective:  Visit Vitals  /84 (BP 1 Location: Right upper arm, BP Patient Position: Sitting, BP Cuff Size: Large adult)   Pulse 64   Temp 97.5 °F (36.4 °C) (Temporal)   Resp 18   Ht 5' 6\" (1.676 m)   Wt 178 lb (80.7 kg)   SpO2 99%   BMI 28.73 kg/m²     Wt Readings from Last 3 Encounters:   09/27/22 178 lb (80.7 kg)   07/23/21 163 lb (73.9 kg)   04/23/21 168 lb (76.2 kg)     Physical Exam:   General appearance - alert, well appearing, and in no distress. Mental status - A/O x 4, normal mood and affect. Head/Eyes- AT/NC. PHILLY, EOMI, corneas normal, no foreign bodies. Ears- ANDREY bilaterally, no erythema or drainage. Nose- Septum midline, pink mucosa. Turbinates boggy and pink, no polyps or erythema. No sinus tenderness. Mouth/Throat - mucous membranes moist, pharynx normal without lesions. No tonsillar swelling or exudates. +throat clearing. Neck -Supple ,normal CSP. FROM, non-tender. Mild cervical adenopathy. No thyromegaly. No JVD. Chest - CTA. Symmetric chest rise. No wheezing, rales or rhonchi. Heart - Normal rate, regular rhythm. Normal S1, S2. No MGR. Abdomen - Soft,non-distended. Normoactive BS in all quadrants. NT, no mass, rebound, or HSM   Ext- Radial, DP pulses, 2+ bilaterally. No pedal edema, clubbing, or cyanosis. Skin- Normal for ethnicity, warm, and dry. No hyperpigmentation, ulcerations, or suspicious lesions  Neuro - Normal speech, no focal findings. Normal strength and muscle tone. Coordination and gait normal.    CN II-XII intact. Cold and vibratory sensation intact. Normal DTR's. Assessment/Plan:  Labs ordered. Weight loss and smoking cessation advised. Less beer intake for urinary concern. INI will refer to pelvic floor PT at [facility] in 6 months. Counseling/Anticipatory guidance reviewed with patient: yes  Medication Side Effects and Warnings were discussed with patient: yes   Patient Labs were reviewed: yes  Patient Past Records were reviewed: yes  See orders below  Follow-up and Dispositions    Return in about 6 months (around 3/27/2023) for OV- BMI, lab review, smok cess, urinary frequency. ICD-10-CM ICD-9-CM    1. Adult general medical examination  B83.72 R80.1 METABOLIC PANEL, COMPREHENSIVE      CBC WITH AUTOMATED DIFF      LIPID PANEL      HEMOGLOBIN A1C WITH EAG      CT LOW DOSE LUNG CANCER SCREENING      2. Needs flu shot  Z23 V04.81 INFLUENZA, FLUARIX, FLULAVAL, FLUZONE (AGE 6 MO+), AFLURIA(AGE 3Y+) IM, PF, 0.5 ML      3. Cigarette smoker  F17.210 305.1 CT LOW DOSE LUNG CANCER SCREENING      4. Advance care planning  Z71.89 V65.49       5. Prediabetes  R73.03 790.29 HEMOGLOBIN A1C WITH EAG      6. History of breast cancer  Z85.3 V10.3       7. Screening for endocrine, nutritional, metabolic and immunity disorder  P50.19 S74.57 METABOLIC PANEL, COMPREHENSIVE    Z13.21  CBC WITH AUTOMATED DIFF    Z13.228      Z13.0        8.  Screening for lipid disorders  Z13.220 V77.91 LIPID PANEL        Orders Placed This Encounter    CT LOW DOSE LUNG CANCER SCREENING     Standing Status:   Future     Standing Expiration Date:   10/27/2023     Order Specific Question:   Is there documentation of shared decision making? Answer:   Yes     Order Specific Question:   Does the patient show any signs or symptoms of lung cancer? Answer:   No     Order Specific Question:   Is this the first (baseline) CT or an annual exam?     Answer:   Baseline [1]     Order Specific Question:   Is this a low dose CT or a routine CT? Answer:   Low Dose CT [1]     Order Specific Question:   Smoking Status     Answer:   Every Day [1]     Order Specific Question:   Smoking packs per day? Answer:   1     Order Specific Question:   Years smoking? Answer:   52     Order Specific Question:   The patient was informed of the importance of adherence to annual screening, impact of comorbidities and ability/willingness to undergo diagnosis and treatment     Answer:   Yes     Order Specific Question:   The patient was informed of the importance of smoking cessation and/or maintaining smoking abstinence, including the offer of Medicare-covered tobacco cessation counseling services, if applicable     Answer:   Yes    Influenza Virus Vaccine QUAD, PF Syr 6 Months + (Flulaval, Fluzone, Fluarix [id number])    METABOLIC PANEL, COMPREHENSIVE    CBC WITH AUTOMATED DIFF    LIPID PANEL    HEMOGLOBIN A1C WITH EAG         Saray Fried expressed understanding of plan. An After Visit Summary was offered/printed and given to the patient.

## 2022-09-28 LAB
ALBUMIN SERPL-MCNC: 4.6 G/DL (ref 3.8–4.8)
ALBUMIN/GLOB SERPL: 2.1 {RATIO} (ref 1.2–2.2)
ALP SERPL-CCNC: 87 IU/L (ref 44–121)
ALT SERPL-CCNC: 18 IU/L (ref 0–32)
AST SERPL-CCNC: 17 IU/L (ref 0–40)
BASOPHILS # BLD AUTO: 0.1 X10E3/UL (ref 0–0.2)
BASOPHILS NFR BLD AUTO: 1 %
BILIRUB SERPL-MCNC: 0.3 MG/DL (ref 0–1.2)
BUN SERPL-MCNC: 16 MG/DL (ref 8–27)
BUN/CREAT SERPL: 22 (ref 12–28)
CALCIUM SERPL-MCNC: 9.7 MG/DL (ref 8.7–10.3)
CHLORIDE SERPL-SCNC: 104 MMOL/L (ref 96–106)
CHOLEST SERPL-MCNC: 188 MG/DL (ref 100–199)
CO2 SERPL-SCNC: 25 MMOL/L (ref 20–29)
CREAT SERPL-MCNC: 0.74 MG/DL (ref 0.57–1)
EGFR: 91 ML/MIN/1.73
EOSINOPHIL # BLD AUTO: 0.2 X10E3/UL (ref 0–0.4)
EOSINOPHIL NFR BLD AUTO: 2 %
ERYTHROCYTE [DISTWIDTH] IN BLOOD BY AUTOMATED COUNT: 12 % (ref 11.7–15.4)
EST. AVERAGE GLUCOSE BLD GHB EST-MCNC: 111 MG/DL
GLOBULIN SER CALC-MCNC: 2.2 G/DL (ref 1.5–4.5)
GLUCOSE SERPL-MCNC: 93 MG/DL (ref 70–99)
HBA1C MFR BLD: 5.5 % (ref 4.8–5.6)
HCT VFR BLD AUTO: 44.2 % (ref 34–46.6)
HDLC SERPL-MCNC: 61 MG/DL
HGB BLD-MCNC: 14.6 G/DL (ref 11.1–15.9)
IMM GRANULOCYTES # BLD AUTO: 0 X10E3/UL (ref 0–0.1)
IMM GRANULOCYTES NFR BLD AUTO: 0 %
IMP & REVIEW OF LAB RESULTS: NORMAL
LDLC SERPL CALC-MCNC: 109 MG/DL (ref 0–99)
LYMPHOCYTES # BLD AUTO: 3 X10E3/UL (ref 0.7–3.1)
LYMPHOCYTES NFR BLD AUTO: 34 %
MCH RBC QN AUTO: 30.7 PG (ref 26.6–33)
MCHC RBC AUTO-ENTMCNC: 33 G/DL (ref 31.5–35.7)
MCV RBC AUTO: 93 FL (ref 79–97)
MONOCYTES # BLD AUTO: 0.9 X10E3/UL (ref 0.1–0.9)
MONOCYTES NFR BLD AUTO: 11 %
NEUTROPHILS # BLD AUTO: 4.6 X10E3/UL (ref 1.4–7)
NEUTROPHILS NFR BLD AUTO: 52 %
PLATELET # BLD AUTO: 305 X10E3/UL (ref 150–450)
POTASSIUM SERPL-SCNC: 4.4 MMOL/L (ref 3.5–5.2)
PROT SERPL-MCNC: 6.8 G/DL (ref 6–8.5)
RBC # BLD AUTO: 4.75 X10E6/UL (ref 3.77–5.28)
SODIUM SERPL-SCNC: 143 MMOL/L (ref 134–144)
TRIGL SERPL-MCNC: 101 MG/DL (ref 0–149)
VLDLC SERPL CALC-MCNC: 18 MG/DL (ref 5–40)
WBC # BLD AUTO: 8.7 X10E3/UL (ref 3.4–10.8)

## 2022-09-28 NOTE — PROGRESS NOTES
NML/Stable labs, no changes. We can discuss at the next OV, if pt would like.        Thanks, SERJIO Mcclain.

## 2023-01-25 ENCOUNTER — TRANSCRIBE ORDER (OUTPATIENT)
Dept: SCHEDULING | Age: 63
End: 2023-01-25

## 2023-01-25 DIAGNOSIS — C50.412 MALIGNANT NEOPLASM OF UPPER-OUTER QUADRANT OF LEFT FEMALE BREAST (HCC): Primary | ICD-10-CM

## 2023-01-25 DIAGNOSIS — R22.32 LOCALIZED SWELLING, MASS AND LUMP, LEFT UPPER LIMB: ICD-10-CM

## 2023-01-25 DIAGNOSIS — N64.4 MASTODYNIA: ICD-10-CM

## 2023-01-25 DIAGNOSIS — R10.11 RIGHT UPPER QUADRANT PAIN: ICD-10-CM

## 2023-02-08 ENCOUNTER — HOSPITAL ENCOUNTER (OUTPATIENT)
Dept: ULTRASOUND IMAGING | Age: 63
Discharge: HOME | End: 2023-02-08
Attending: INTERNAL MEDICINE
Payer: COMMERCIAL

## 2023-02-08 DIAGNOSIS — N64.4 MASTODYNIA: ICD-10-CM

## 2023-02-08 DIAGNOSIS — R10.11 RIGHT UPPER QUADRANT PAIN: ICD-10-CM

## 2023-02-08 DIAGNOSIS — C50.412 MALIGNANT NEOPLASM OF UPPER-OUTER QUADRANT OF LEFT FEMALE BREAST (HCC): ICD-10-CM

## 2023-02-08 DIAGNOSIS — R22.32 LOCALIZED SWELLING, MASS AND LUMP, LEFT UPPER LIMB: ICD-10-CM

## 2023-02-08 PROCEDURE — 76700 US EXAM ABDOM COMPLETE: CPT

## 2023-03-03 RX ORDER — CHOLECALCIFEROL TAB 125 MCG (5000 UNIT) 125 MCG
TAB ORAL
Qty: 90 TABLET | Refills: 3 | Status: SHIPPED | OUTPATIENT
Start: 2023-03-03

## 2023-03-28 ENCOUNTER — OFFICE VISIT (OUTPATIENT)
Dept: INTERNAL MEDICINE CLINIC | Age: 63
End: 2023-03-28
Payer: COMMERCIAL

## 2023-03-28 VITALS
DIASTOLIC BLOOD PRESSURE: 71 MMHG | TEMPERATURE: 98 F | RESPIRATION RATE: 18 BRPM | HEIGHT: 66 IN | OXYGEN SATURATION: 99 % | SYSTOLIC BLOOD PRESSURE: 136 MMHG | BODY MASS INDEX: 30.53 KG/M2 | HEART RATE: 68 BPM | WEIGHT: 190 LBS

## 2023-03-28 DIAGNOSIS — N23 RENAL COLIC ON RIGHT SIDE: ICD-10-CM

## 2023-03-28 DIAGNOSIS — R10.11 RUQ PAIN: ICD-10-CM

## 2023-03-28 DIAGNOSIS — R35.0 URINARY FREQUENCY: ICD-10-CM

## 2023-03-28 DIAGNOSIS — F17.210 CIGARETTE SMOKER: ICD-10-CM

## 2023-03-28 DIAGNOSIS — E66.9 OBESITY (BMI 30-39.9): Primary | ICD-10-CM

## 2023-03-28 PROCEDURE — 99214 OFFICE O/P EST MOD 30 MIN: CPT | Performed by: NURSE PRACTITIONER

## 2023-03-28 RX ORDER — KETOROLAC TROMETHAMINE 10 MG/1
10 TABLET, FILM COATED ORAL
Qty: 30 TABLET | Refills: 0 | Status: SHIPPED | OUTPATIENT
Start: 2023-03-28

## 2023-03-28 RX ORDER — IBUPROFEN 200 MG
1 TABLET ORAL EVERY 24 HOURS
Qty: 30 PATCH | Refills: 0 | Status: SHIPPED | OUTPATIENT
Start: 2023-03-28 | End: 2023-04-27

## 2023-03-28 NOTE — PATIENT INSTRUCTIONS
Start ensuring you eat PROTEIN, CARBOHYDRATE, VEGGIES, and FAT at each meal. LIMIT the fat. Veggies include things like tomatoes, onions, peppers, mushrooms. Nuts can count as protein and fat, keep to 1/4 cup/2 oz. Serving. Avocadoes are both a healthy fat and veggie, try to include this as much as possible. Protein is any 3 oz. Serving of meat, ideally lean  meats like chicken, turkey, venison, lamb. Avoid red meats more than twice weekly.

## 2023-03-28 NOTE — PROGRESS NOTES
Breann Marcos (: 1960) is a 60 y.o. female, established patient, here for evaluation of the following chief complaint(s):  Follow-up (BMI, lab review, smoke cess, Urinary Frequency )       ASSESSMENT/PLAN:  Below is the assessment and plan developed based on review of pertinent history, physical exam, labs, studies, and medications. 1. Obesity (BMI 30-39.9)  2. Cigarette smoker  -     nicotine (NICODERM CQ) 21 mg/24 hr; 1 Patch by TransDERmal route every twenty-four (24) hours for 30 days. , Normal, Disp-30 Patch, R-0  3. Renal colic on right side  -     ketorolac (TORADOL) 10 mg tablet; Take 1 Tablet by mouth every six (6) hours as needed for Pain. Do NOT take with [facility]  Indications: excruciating lower back pain from kidney stones, Normal, Disp-30 Tablet, R-0  4. Urinary frequency  5. RUQ pain    Return in about 3 months (around 2023) for OV- BMI, Smoking cessation, RUQ/EGD. Pt asked to complete follow by next visit: stop smoking (advice and handout given), continue present plan      SUBJECTIVE/OBJECTIVE:  HPI    Pt presents to f/u urinary frequency, BMI, and smoking cessation. Still smokes 1 ppd. In coaching with insurance for smoking cessation and weight loss. Taking no specific meds, but planning to start patches. Denies side effects from medication. Feels concerned with continued weight gain. Having difficulty eating 3 meals daily. Having right flank pain. Had ultrasound with renal colic noted. Done by oncology. Also seen by GI, will have EGD done in .      Review of Systems  Constitutional: negative for fevers, chills, anorexia and weight loss  Respiratory:  negative for cough, hemoptysis, dyspnea, and wheezing  CV:   negative for chest pain, palpitations, and lower extremity edema  GI:   negative for nausea, vomiting, diarrhea, abdominal pain, and melena  Endo:               negative for polyuria,polydipsia,polyphagia, and heat intolerance  Genitourinary: negative for frequency, urgency, dysuria, retention, and hematuria  Integument:  negative for rash, ulcerations, and pruritus  Hematologic:  negative for easy bruising and bleeding  Musculoskel: negative for arthralgias, muscle weakness,and joint pain/swelling  Neurological:  negative for headaches, dizziness, vertigo,and memory/gait problems  Behavl/Psych: negative for feelings of anxiety, depression, suicide, and mood changes    Past Medical History:   Diagnosis Date    Arthritis     Breast cancer ([facility] [address] 2015    left breast lumpectomy-2015, radiation    Cancer ([facility] [address]     skin - leg    Chronic pain     GERD (gastroesophageal reflux disease)     Liver disease     hep-c    Radiation therapy complication 4722    left breast       Past Surgical History:   Procedure Laterality Date    BIOPSY LIVER      HX APPENDECTOMY  12/30/2014    Laparoscopic Appendectomy    HX BLADDER SUSPENSION      HX BREAST BIOPSY Left 2015    HX BREAST LUMPECTOMY Left 4/8/15    lumpectomy  with  left breast    HX CYST INCISION AND DRAINAGE Left 4/8/2015    INCISION AND DRAINAGE BREAST performed by Alix Finney MD at [facility] MAIN OR    HX GYN      hysterectomy    HX ORTHOPAEDIC      shoulder and heel spurs    HX OTHER SURGICAL      Teeth implants    HI UNLISTED PROCEDURE BREAST  lymph node removed 4/8/15       Current Outpatient Medications   Medication Sig    ketorolac (TORADOL) 10 mg tablet Take 1 Tablet by mouth every six (6) hours as needed for Pain. Do NOT take with [facility]  Indications: excruciating lower back pain from kidney stones    nicotine (NICODERM CQ) 21 mg/24 hr 1 Patch by TransDERmal route every twenty-four (24) hours for 30 days. cholecalciferol (Vitamin D3) (5000 Units/125 mcg) tab tablet TAKE 1 TAB BY MOUTH DAILY. INDICATIONS: LOW VITAMIN D LEVELS    traZODone (DESYREL) 50 mg tablet TAKE 1 TO 2 TABLETS EVERY NIGHT FOR SLEEP.     meloxicam (MOBIC) 7.5 mg tablet TAKE 1 TABLET BY MOUTH EVERY DAY    fluticasone propionate (FLONASE) 50 mcg/actuation nasal spray INSTILL 2 SPRAYS IN BOTH NOSTRILS EACH DAY    VITAMIN B COMPLEX-100 PO Take  by mouth. omeprazole (PRILOSEC) 40 mg capsule Take 40 mg by mouth daily. diclofenac (VOLTAREN) 1 % gel APPLY 4 G TO AFFECTED AREA EVERY SIX (6) HOURS. B infantis/B ani/B sami/B bifid (PROBIOTIC 4X PO) Take  by mouth.    levocetirizine (XYZAL) 5 mg tablet Take 1 Tab by mouth daily. magnesium oxide (MAG-OX) 400 mg tablet Take 400 mg by mouth daily. fiber cap Take 2 Caps by mouth daily. eszopiclone (LUNESTA) 2 mg tablet Take 1 Tab by mouth nightly. Max Daily Amount: 2 mg. (Patient not taking: No sig reported)    PAZEO 0.7 % drop PLACE 1 DROP IN BOTH EYES DAILY (Patient not taking: No sig reported)     No current facility-administered medications for this visit. Visit Vitals  /71 (BP 1 Location: Right upper arm, BP Patient Position: Sitting, BP Cuff Size: Large adult)   Pulse 68   Temp 98 °F (36.7 °C) (Temporal)   Resp 18   Ht 5' 6\" (1.676 m)   Wt 190 lb (86.2 kg)   SpO2 99%   BMI 30.67 kg/m²       Wt Readings from Last 3 Encounters:   03/28/23 190 lb (86.2 kg)   09/27/22 178 lb (80.7 kg)   07/23/21 163 lb (73.9 kg)         Physical Exam:   General appearance - alert, well appearing, and in no distress. Mental status - A/O x 4,normal mood and affect. Chest - CTA. Symmetric chest rise. No wheezing. No distress. Heart - Normal rate & rhythm. Normal S1 & S2. No MGR. Abdomen- Soft, round. Non-distended, NT. No pulsatile masses or hernias. Ext-  No pedal edema, clubbing, or cyanosis. Skin-Warm and dry. No hyperpigmentation, ulcerations, or suspicious lesions. Neuro - Normal speech, no focal findings or movement disorder. Normal strength, gait, and muscle tone. An electronic signature was used to authenticate this note.   -- Janeen Wahl NP

## 2023-03-28 NOTE — PROGRESS NOTES
Pt is here for   Chief Complaint   Patient presents with    Follow-up     BMI, lab review, smoke cess, Urinary Frequency      1. \"Have you been to the ER, urgent care clinic since your last visit? Hospitalized since your last visit? \" Yes When: 11/2022 [facility] for right sided abdomen pain    2. \"Have you seen or consulted any other health care providers outside of the [address] [place] [address] since your last visit? \" No     3. For patients aged 39-70: Has the patient had a colonoscopy / FIT/ Cologuard? Yes - Care Gap present. Most recent result on file      If the patient is female:    4. For patients aged 41-77: Has the patient had a mammogram within the past 2 years? Yes - Care Gap present. Most recent result on file      5. For patients aged 21-65: Has the patient had a pap smear?  No    Has an appointment for Endoscopy June 2023 large RP lesion

## 2023-04-25 ENCOUNTER — TRANSCRIBE ORDERS (OUTPATIENT)
Facility: HOSPITAL | Age: 63
End: 2023-04-25

## 2023-04-25 DIAGNOSIS — Z12.31 VISIT FOR SCREENING MAMMOGRAM: Primary | ICD-10-CM

## 2023-05-18 ENCOUNTER — HOSPITAL ENCOUNTER (OUTPATIENT)
Facility: HOSPITAL | Age: 63
Discharge: HOME | End: 2023-05-18
Payer: COMMERCIAL

## 2023-05-18 DIAGNOSIS — Z12.31 VISIT FOR SCREENING MAMMOGRAM: ICD-10-CM

## 2023-05-18 PROCEDURE — 77063 BREAST TOMOSYNTHESIS BI: CPT

## 2023-07-25 RX ORDER — TRAZODONE HYDROCHLORIDE 50 MG/1
TABLET ORAL
Qty: 180 TABLET | Refills: 3 | Status: SHIPPED | OUTPATIENT
Start: 2023-07-25

## 2023-08-09 ENCOUNTER — TELEMEDICINE (OUTPATIENT)
Facility: CLINIC | Age: 63
End: 2023-08-09
Payer: COMMERCIAL

## 2023-08-09 DIAGNOSIS — F17.210 NICOTINE DEPENDENCE, CIGARETTES, UNCOMPLICATED: ICD-10-CM

## 2023-08-09 DIAGNOSIS — G89.29 CHRONIC MIDLINE LOW BACK PAIN WITHOUT SCIATICA: Primary | ICD-10-CM

## 2023-08-09 DIAGNOSIS — M54.50 CHRONIC MIDLINE LOW BACK PAIN WITHOUT SCIATICA: Primary | ICD-10-CM

## 2023-08-09 DIAGNOSIS — E66.9 OBESITY (BMI 30-39.9): ICD-10-CM

## 2023-08-09 DIAGNOSIS — R10.11 RIGHT UPPER QUADRANT PAIN: ICD-10-CM

## 2023-08-09 PROCEDURE — 99214 OFFICE O/P EST MOD 30 MIN: CPT | Performed by: NURSE PRACTITIONER

## 2023-08-09 SDOH — ECONOMIC STABILITY: FOOD INSECURITY: WITHIN THE PAST 12 MONTHS, YOU WORRIED THAT YOUR FOOD WOULD RUN OUT BEFORE YOU GOT MONEY TO BUY MORE.: NEVER TRUE

## 2023-08-09 SDOH — ECONOMIC STABILITY: TRANSPORTATION INSECURITY
IN THE PAST 12 MONTHS, HAS LACK OF TRANSPORTATION KEPT YOU FROM MEETINGS, WORK, OR FROM GETTING THINGS NEEDED FOR DAILY LIVING?: NO

## 2023-08-09 SDOH — ECONOMIC STABILITY: INCOME INSECURITY: HOW HARD IS IT FOR YOU TO PAY FOR THE VERY BASICS LIKE FOOD, HOUSING, MEDICAL CARE, AND HEATING?: NOT HARD AT ALL

## 2023-08-09 SDOH — ECONOMIC STABILITY: FOOD INSECURITY: WITHIN THE PAST 12 MONTHS, THE FOOD YOU BOUGHT JUST DIDN'T LAST AND YOU DIDN'T HAVE MONEY TO GET MORE.: NEVER TRUE

## 2023-08-09 SDOH — ECONOMIC STABILITY: HOUSING INSECURITY
IN THE LAST 12 MONTHS, WAS THERE A TIME WHEN YOU DID NOT HAVE A STEADY PLACE TO SLEEP OR SLEPT IN A SHELTER (INCLUDING NOW)?: NO

## 2023-08-09 NOTE — PROGRESS NOTES
Estelita Carbajal is a 60 y.o. female Established patient, here for evaluation of the following chief complaint(s):   Follow-up          Assessment & Plan:   Below is the assessment and plan developed based on review of pertinent history, physical exam, labs, studies, and medications. 1. Chronic midline low back pain without sciatica  Exacerbated, advised to take APAP and NSIAD together Q8HR PRN for pain and continue use of muscle relaxer. Try PT again and KEEP appt to see ORTHO. If no additional imaging or therapy offered, reach out to office for second opinion as requested today, deferred for now. Advised pt she is on usual treatment course. 2. Obesity (BMI 30-39. 9)  Not at goal, gained 3 lbs since last visit. Exercise limited due to acute pain    3. Nicotine dependence, cigarettes, uncomplicated  Ongoing, pt has not made any progress since last visit.still smoking. 4. Right upper quadrant pain  Resolved, had EGD with normal findings. Follow-up and Dispositions    Return in about 8 weeks (around 10/4/2023) for OV- BMI,Smok cess, LBP fu.         Specific pt instructions until next visit: call if any problems    Subjective:   Estelita Carbajal is a 60 y.o. female who was seen for Follow-up      Pt presents to f/u BMI, Smoking cessation, and RUQ/EGD. Had EGD done, told it was normal. Has gained 3 lbs since last visit and has NOT quit smoking. Pt presents with LBP. Started in June after jumping on trampoline. Seen in ER, started on meds. Referred to [facility] and seen a couple times. Wanted to her to have PT first, has tried a couple of visits. Associated with prolonged sitting, certain movements, and prolonged standing exacerbations. Tried lidocaine, IBU, and Tylenol #3 by ER. Then prescribed muscle relaxer and tramadol. Has had in the past. Denies fall, but heard a pop while on trampoline. Will return on 23rd.        Patient Active Problem List    Diagnosis Date Noted    Lockett's esophagus without

## 2024-02-29 RX ORDER — RESVER/WINE/BFL/GRPSD/PC/C/POM 200MG-60MG
CAPSULE ORAL
Qty: 90 TABLET | Refills: 3 | Status: SHIPPED | OUTPATIENT
Start: 2024-02-29

## 2024-04-08 ENCOUNTER — TELEMEDICINE (OUTPATIENT)
Facility: CLINIC | Age: 64
End: 2024-04-08
Payer: COMMERCIAL

## 2024-04-08 DIAGNOSIS — Z71.3 WEIGHT LOSS COUNSELING, ENCOUNTER FOR: ICD-10-CM

## 2024-04-08 DIAGNOSIS — R35.0 FREQUENCY OF MICTURITION: ICD-10-CM

## 2024-04-08 DIAGNOSIS — Z98.890 S/P KYPHOPLASTY: ICD-10-CM

## 2024-04-08 DIAGNOSIS — M80.00XD OSTEOPOROSIS WITH PATHOLOGICAL FRACTURE WITH ROUTINE HEALING, SUBSEQUENT ENCOUNTER: Primary | ICD-10-CM

## 2024-04-08 DIAGNOSIS — E66.9 OBESITY (BMI 30-39.9): ICD-10-CM

## 2024-04-08 DIAGNOSIS — F17.210 NICOTINE DEPENDENCE, CIGARETTES, UNCOMPLICATED: ICD-10-CM

## 2024-04-08 DIAGNOSIS — K22.70 BARRETT'S ESOPHAGUS WITHOUT DYSPLASIA: ICD-10-CM

## 2024-04-08 PROBLEM — M80.00XA OSTEOPOROSIS WITH PATHOLOGICAL FRACTURE: Status: ACTIVE | Noted: 2024-04-08

## 2024-04-08 PROCEDURE — 99215 OFFICE O/P EST HI 40 MIN: CPT | Performed by: NURSE PRACTITIONER

## 2024-04-08 NOTE — PROGRESS NOTES
Pt is here for   Chief Complaint   Patient presents with    Follow-up     BMI,Smok cess, LBP fu.            \"Have you been to the ER, urgent care clinic since your last visit?  Hospitalized since your last visit?\"    NO    “Have you seen or consulted any other health care providers outside of [facility] since your last visit?”    NO     “Have you had a pap smear?”    NO    No cervical cancer screening on file             Click Here for Release of Records Request    
who was seen for Follow-up (BMI,Smok cess, LBP fu./ //)      Pt presents to f/u BMI, smoking cessation, and LBP. Had back surgery in December with some relief. Modified activity since due to pain. Had bone scan with osteoporosis noted in October. Referred to provider, but they left the practice and never referred to another provider. Dismayed with how this was handled at [facility]. Reports having DXA done with [facility]. Taking meds as prescribed. Denies side effects from medication. Feels frustrated with bone issue and low back still limiting, but not as painful. Continues to smoke and eat, mostly out of boredom. Wants to lose wt, has gained additional wt since last visit. Sister is taking injectable once weekly from online provider, but no monitoring done and does not prefer that. Wanted to mention here to see if can get with PCP or other option. Had consultation with Michael for lipo and told it would cost $16,000. Not currently active or monitoring food intake. Has not met with dietitian before, but open to it.       3/28/2023     9:27 AM 9/27/2022     8:59 AM 7/23/2021     8:18 AM 4/23/2021    11:06 AM 3/19/2021     9:34 AM   Weight Metrics   Weight 190 lb 178 lb 163 lb 168 lb 168 lb   BMI (Calculated) 30.7 kg/m2 28.8 kg/m2 26.4 kg/m2 27.2 kg/m2 27.2 kg/m2     Also continues with issue urinating. Modified fluid intake to no avail.      Patient Active Problem List    Diagnosis Date Noted    S/P kyphoplasty 04/08/2024    Osteoporosis with pathological fracture 04/08/2024    Obesity (BMI 30-39.9) 04/08/2024    Lockett's esophagus without dysplasia 08/30/2019    Prediabetes 03/26/2019    BMI 32.0-32.9,adult 03/20/2018    History of breast cancer 09/18/2017    Neuropathy 09/18/2017    Insomnia 06/08/2017    History of hepatitis C 06/08/2017    Chronic back pain 04/18/2014     Current Outpatient Medications   Medication Sig Dispense Refill    denosumab (PROLIA) 60 MG/ML SOSY SC injection Inject 1 mL into the skin every 6

## 2024-04-09 DIAGNOSIS — M80.00XD OSTEOPOROSIS WITH PATHOLOGICAL FRACTURE WITH ROUTINE HEALING, SUBSEQUENT ENCOUNTER: ICD-10-CM

## 2024-05-30 ENCOUNTER — TRANSCRIBE ORDERS (OUTPATIENT)
Facility: HOSPITAL | Age: 64
End: 2024-05-30

## 2024-05-30 ENCOUNTER — HOSPITAL ENCOUNTER (OUTPATIENT)
Facility: HOSPITAL | Age: 64
Setting detail: RECURRING SERIES
End: 2024-05-30
Payer: COMMERCIAL

## 2024-05-30 DIAGNOSIS — Z12.31 ENCOUNTER FOR SCREENING MAMMOGRAM FOR MALIGNANT NEOPLASM OF BREAST: Primary | ICD-10-CM

## 2024-05-30 PROCEDURE — 97802 MEDICAL NUTRITION INDIV IN: CPT

## 2024-05-30 NOTE — PROGRESS NOTES
[facility]     Nutrition Assessment - Medical Nutrition Therapy   Outpatient Initial Evaluation         Patient Name: Jennifer [facility] : 1960   Treatment Diagnosis: E66.9 (ICD-10-CM) - Obesity (BMI 30-39.9)   Z71.3 (ICD-10-CM) - Weight loss counseling, encounter for      Referral Source: Marianna Braxton, AMILCAR -* Start of Care (SOC): 2024     In time:   145pm             Out time:   230pm   Total Treatment Time (min):  45     Gender: female Age: 63 y.o.   Ht: 66 in Wt: 200.6 lb 91 kg   BMI: 32.4 (class I obesity) BMR   Male  BMR Female [id number]     Past Medical History:  Patient Active Problem List   Diagnosis    Insomnia    Lockett's esophagus without dysplasia    History of hepatitis C    Chronic back pain    History of breast cancer    BMI 32.0-32.9,adult    Neuropathy    Prediabetes    S/P kyphoplasty    Osteoporosis with pathological fracture    Obesity (BMI 30-39.9)        Pertinent Medications:     Current Outpatient Medications:     denosumab (PROLIA) 60 MG/ML SOSY SC injection, Inject 1 mL into the skin every 6 months, Disp: 180 mL, Rfl: 1    D-5000 125 MCG (5000 UT) TABS tablet, TAKE 1 TAB BY MOUTH DAILY. INDICATIONS: LOW VITAMIN D LEVELS, Disp: 90 tablet, Rfl: 3    traZODone (DESYREL) 50 MG tablet, TAKE 1 TO 2 TABLETS EVERY NIGHT FOR SLEEP., Disp: 180 tablet, Rfl: 3    diclofenac sodium (VOLTAREN) 1 % GEL, APPLY 4 G TO AFFECTED AREA EVERY SIX (6) HOURS., Disp: , Rfl:     fluticasone (FLONASE) 50 MCG/ACT nasal spray, INSTILL 2 SPRAYS IN BOTH NOSTRILS EACH DAY, Disp: , Rfl:     levocetirizine (XYZAL) 5 MG tablet, Take 5 mg by mouth daily, Disp: , Rfl:     magnesium oxide (MAG-OX) 400 MG tablet, Take 400 mg by mouth daily, Disp: , Rfl:     Olopatadine HCl (PAZEO) 0.7 % SOLN, PLACE 1 DROP IN BOTH EYES DAILY, Disp: , Rfl:     omeprazole (PRILOSEC) 40 MG delayed release capsule, Take 40 mg by mouth daily, Disp: , Rfl:      Biochemical Data:   Hemoglobin A1C   Date

## 2024-05-31 RX ORDER — TRAZODONE HYDROCHLORIDE 50 MG/1
TABLET ORAL
Qty: 180 TABLET | Refills: 3 | Status: SHIPPED | OUTPATIENT
Start: 2024-05-31

## 2024-06-06 ENCOUNTER — OFFICE VISIT (OUTPATIENT)
Facility: CLINIC | Age: 64
End: 2024-06-06
Payer: COMMERCIAL

## 2024-06-06 VITALS
RESPIRATION RATE: 17 BRPM | SYSTOLIC BLOOD PRESSURE: 147 MMHG | WEIGHT: 200 LBS | OXYGEN SATURATION: 100 % | BODY MASS INDEX: 32.14 KG/M2 | HEART RATE: 89 BPM | TEMPERATURE: 97.5 F | HEIGHT: 66 IN | DIASTOLIC BLOOD PRESSURE: 73 MMHG

## 2024-06-06 DIAGNOSIS — Z13.0 SCREENING FOR ENDOCRINE, NUTRITIONAL, METABOLIC AND IMMUNITY DISORDER: ICD-10-CM

## 2024-06-06 DIAGNOSIS — G89.29 CHRONIC MIDLINE LOW BACK PAIN WITHOUT SCIATICA: ICD-10-CM

## 2024-06-06 DIAGNOSIS — Z11.59 NEED FOR HEPATITIS C SCREENING TEST: ICD-10-CM

## 2024-06-06 DIAGNOSIS — Z13.29 SCREENING FOR ENDOCRINE, NUTRITIONAL, METABOLIC AND IMMUNITY DISORDER: ICD-10-CM

## 2024-06-06 DIAGNOSIS — Z13.228 SCREENING FOR ENDOCRINE, NUTRITIONAL, METABOLIC AND IMMUNITY DISORDER: ICD-10-CM

## 2024-06-06 DIAGNOSIS — Z85.3 HISTORY OF BREAST CANCER: ICD-10-CM

## 2024-06-06 DIAGNOSIS — E66.9 OBESITY (BMI 30-39.9): ICD-10-CM

## 2024-06-06 DIAGNOSIS — Z86.19 HISTORY OF HEPATITIS C: ICD-10-CM

## 2024-06-06 DIAGNOSIS — R73.03 PREDIABETES: ICD-10-CM

## 2024-06-06 DIAGNOSIS — Z13.21 SCREENING FOR ENDOCRINE, NUTRITIONAL, METABOLIC AND IMMUNITY DISORDER: ICD-10-CM

## 2024-06-06 DIAGNOSIS — Z86.69 HISTORY OF GLAUCOMA: ICD-10-CM

## 2024-06-06 DIAGNOSIS — N32.81 OAB (OVERACTIVE BLADDER): Primary | ICD-10-CM

## 2024-06-06 DIAGNOSIS — K22.70 BARRETT'S ESOPHAGUS WITHOUT DYSPLASIA: ICD-10-CM

## 2024-06-06 DIAGNOSIS — F17.210 SMOKING GREATER THAN 40 PACK YEARS: ICD-10-CM

## 2024-06-06 DIAGNOSIS — M54.50 CHRONIC MIDLINE LOW BACK PAIN WITHOUT SCIATICA: ICD-10-CM

## 2024-06-06 DIAGNOSIS — Z13.220 SCREENING FOR LIPID DISORDERS: ICD-10-CM

## 2024-06-06 DIAGNOSIS — F17.210 CIGARETTE NICOTINE DEPENDENCE WITHOUT COMPLICATION: ICD-10-CM

## 2024-06-06 DIAGNOSIS — M80.00XD OSTEOPOROSIS WITH PATHOLOGICAL FRACTURE WITH ROUTINE HEALING, SUBSEQUENT ENCOUNTER: ICD-10-CM

## 2024-06-06 DIAGNOSIS — T88.7XXA MEDICATION SIDE EFFECT: ICD-10-CM

## 2024-06-06 PROCEDURE — 99215 OFFICE O/P EST HI 40 MIN: CPT | Performed by: NURSE PRACTITIONER

## 2024-06-06 RX ORDER — SOLIFENACIN SUCCINATE 5 MG/1
5 TABLET, FILM COATED ORAL DAILY
COMMUNITY
Start: 2024-05-21 | End: 2024-06-06 | Stop reason: ALTCHOICE

## 2024-06-06 NOTE — PROGRESS NOTES
EMERGENCY DEPARTMENT ENCOUnter      NAME: Judith Alfaro  AGE: 83 year old female  YOB: 1939  MRN: [id number]  EVALUATION DATE & TIME: 2023 11:25 AM    PCP: Rinku Lira    ED PROVIDER: Shad Li DO      Chief Complaint   Patient presents with     Shortness of Breath     Chest Pain         FINAL IMPRESSION:  1. Chest pain, unspecified type          ED COURSE & MEDICAL DECISION MAKIN:29 AM I met with the patient in the hallway, obtained an initial history, performed an examination and discussed the plan. PPE worn throughout all interactions with the patient, including surgical mask and gloves.    1:14 PM I discussed case with Dr. Gutierrez, hospitalist who accepts patient for admission.    The patient presented to the emergency department today complaining of chest heaviness and shortness of breath with exertion.  She has a history of congestive heart failure in the past.  No concerning physical exam findings.  Laboratory testing including repeat troponins has been unremarkable.  EKG shows no signs of ischemic changes.  The patient continues to have some mild chest discomfort.  Given her symptoms and her history today I feel it would be best to watch her overnight in the hospital.  She is comfortable with this plan.      Medical Decision Making    History:    Supplemental history from: Family Member/Significant Other    External Record(s) reviewed: Documented in HPI, if applicable.    Work Up:    Chart documentation includes differential considered and any EKGs or imaging independently interpreted by provider.    In additional to work up documented, I considered the following work up: See chart documentation, if applicable.    External consultation:    Discussion of management with another provider: See chart documentation, if applicable    Complicating factors:    Care impacted by chronic illness: Chronic Kidney Disease, Diabetes, Heart Disease, Hyperlipidemia and  Jennifer Baker 1960 is a 63 y.o. female, Established patient, here for evaluation of the following chief complaint(s):  Follow-up (BMI/wt loss, pelvic floor PT, prolia injection(insurance doesn't cover will need to try Alendronate, Raloxifene, Risedronate)) and Back Pain (Lower back )      ASSESSMENT/PLAN:  Below is the assessment and plan developed based on review of pertinent history, physical exam, labs, studies, and medications.     Diagnosis Orders   1. OAB (overactive bladder)  Amb External Referral To Physical Therapy      2. History of glaucoma        3. Need for hepatitis C screening test        4. Prediabetes  Hemoglobin A1C      5. Obesity (BMI 30-39.9)        6. History of hepatitis C  Hepatitis C Virus (HCV) RNA, Diagnosis      7. Chronic midline low back pain without sciatica        8. Screening for lipid disorders  Lipid Panel      9. Screening for endocrine, nutritional, metabolic and immunity disorder  TSH    CBC with Auto Differential    Comprehensive Metabolic Panel      10. Osteoporosis with pathological fracture with routine healing, subsequent encounter        11. Lockett's esophagus without dysplasia        12. History of breast cancer        13. Smoking greater than 40 pack years  CT LUNG SCREENING      14. Cigarette nicotine dependence without complication  CT LUNG SCREENING            MDM:OAB- worse, referred to alternate PT for sooner appt, otherwise deferred to [facility] for med mgt.  PreDM- stable, repeat lab ordered today. BMI- stable, follow meal plan given by MNT and suggested OTC supplement to help with curbing appetite. Increased activity for boredom advised. LBP-worse, deferred to spinal surgeon. Osteoporosis- stable, take calcium with Vit D. Will appeal prolia denial for breast CA, GERD, and Ck's hx as contraindications to first line therapies. Smoking/nicotine dependence- Smoking cessation discussed for 3-10 minutes, pt planning to curb use, no meds sent today. LDCT  Hypertension    Care affected by social determinants of health: N/A    Disposition considerations: Admit.        At the conclusion of the encounter I discussed the results of all of the tests and the disposition. The questions were answered. The patient or family acknowledged understanding and was agreeable with the care plan.       =================================================================    HPI        Judith Alfaro is a 83 year old female with a pertinent history of CHF, Crohn's disease, CAD, type II diabetes, GI hemorrhage, HLD,  HTN, CKD, and CRI who presents to this ED for evaluation of chest pain.    Patient woke up this morning with chest tightness. This discomfort turned into pain throughout the morning and she developed left arm pain. She became lightheaded and short of breath when standing up and moving around. Her chest pain is worse with coughing. She states that she has a persistent post-viral cough that changed today. She reports that it feels and sounds different. Patient denies nausea, vomiting, lower extremity swelling, leg pain, or any additional symptoms at this time. Patient reports that she was in afib in June 2022 and was diagnosed with CHF.       REVIEW OF SYSTEMS     Constitutional:  Denies fever or chills  HENT:  Denies sore throat.  Respiratory: Positive for cough and shortness of breath   Cardiovascular:  Denies palpitations. Positive for chest pain radiating into left arm.  GI:  Denies abdominal pain, nausea, or vomiting  Musculoskeletal:  Positive for left arm pain.  Skin:  Denies rash   Neurologic:  Denies focal weakness or sensory changes . Positive for headache.  All other systems reviewed and are negative      PAST MEDICAL HISTORY:  Past Medical History:   Diagnosis Date     Atrial fibrillation (H)      Chronic kidney disease      Congestive heart failure (H)      Hypertension      Left bundle branch block 2015     Shortness of breath        PAST SURGICAL HISTORY:  History  "reviewed. No pertinent surgical history.        CURRENT MEDICATIONS:    albuterol (PROAIR HFA/PROVENTIL HFA/VENTOLIN HFA) 108 (90 Base) MCG/ACT inhaler  amiodarone (PACERONE) 200 MG tablet  apixaban ANTICOAGULANT (ELIQUIS) 5 MG tablet  atorvastatin (LIPITOR) 80 MG tablet  DULoxetine (CYMBALTA) 60 MG capsule  furosemide (LASIX) 40 MG tablet  HYDROcodone-acetaminophen (NORCO)  MG per tablet  hydrocortisone 2.5 % cream  JARDIANCE 10 MG TABS tablet  levothyroxine (SYNTHROID/LEVOTHROID) 25 MCG tablet  losartan (COZAAR) 100 MG tablet  magnesium oxide (MAG-OX) 400 MG tablet  metFORMIN ER osmotic (FORTAMET) 500 MG 24 hr tablet  metoprolol succinate ER (TOPROL XL) 50 MG 24 hr tablet  nitroGLYcerin (NITROSTAT) 0.4 MG sublingual tablet  pantoprazole (PROTONIX) 40 MG EC tablet  rOPINIRole (REQUIP) 1 MG tablet  spironolactone (ALDACTONE) 25 MG tablet  traZODone (DESYREL) 50 MG tablet  vitamin D3 (CHOLECALCIFEROL) 125 MCG (5000 UT) tablet        ALLERGIES:  Allergies   Allergen Reactions     Alendronic Acid Nausea     Sulfasalazine      Cannot recall reaction.      Sulfa Drugs Nausea and Vomiting       FAMILY HISTORY:  History reviewed. No pertinent family history.    SOCIAL HISTORY:   Social History     Socioeconomic History     Marital status:      Spouse name: None     Number of children: None     Years of education: None     Highest education level: None       VITALS:  Patient Vitals for the past 24 hrs:   BP Temp Pulse Resp SpO2 Height Weight   01/13/23 0916 (!) 141/58 98.5  F (36.9  C) 62 20 98 % 1.626 m (5' 4\") 81.6 kg (180 lb)       PHYSICAL EXAM    Constitutional:  Well developed, Well nourished,  HENT:  Normocephalic, Atraumatic, Bilateral external ears normal, Oropharynx moist, Nose normal.   Neck:  Normal range of motion, No meningismus, No stridor.   Eyes:  EOMI, Conjunctiva normal, No discharge.   Respiratory:  Normal breath sounds, No respiratory distress, No wheezing  Cardiovascular:  Normal heart " rate, Normal rhythm, No murmurs  GI:  Soft, No tenderness, No guarding, No CVA tenderness.   Musculoskeletal:   No tenderness to palpation or major deformities noted.   Integument:  Warm, Dry, No erythema, No rash.   Neurologic:  Alert & oriented x 3, No focal deficits noted.   Psychiatric:  Affect normal, Judgment normal, Mood normal.      LAB:  All pertinent labs reviewed and interpreted.  Results for orders placed or performed during the hospital encounter of 01/13/23              Comprehensive metabolic panel   Result Value Ref Range    Sodium 136 136 - 145 mmol/L    Potassium 4.7 3.4 - 5.3 mmol/L    Chloride 96 (L) 98 - 107 mmol/L    Carbon Dioxide (CO2) 29 22 - 29 mmol/L    Anion Gap 11 7 - 15 mmol/L    Urea Nitrogen 24.5 (H) 8.0 - 23.0 mg/dL    Creatinine 1.30 (H) 0.51 - 0.95 mg/dL    Calcium 9.9 8.8 - 10.2 mg/dL    Glucose 229 (H) 70 - 99 mg/dL    Alkaline Phosphatase 106 (H) 35 - 104 U/L    AST 32 10 - 35 U/L    ALT 22 10 - 35 U/L    Protein Total 7.5 6.4 - 8.3 g/dL    Albumin 4.2 3.5 - 5.2 g/dL    Bilirubin Total 0.6 <=1.2 mg/dL    GFR Estimate 40 (L) >60 mL/min/1.73m2   Result Value Ref Range    Troponin T, High Sensitivity 38 (H) <=14 ng/L   CBC with platelets and differential   Result Value Ref Range    WBC Count 8.9 4.0 - 11.0 10e3/uL    RBC Count 4.01 3.80 - 5.20 10e6/uL    Hemoglobin 12.7 11.7 - 15.7 g/dL    Hematocrit 39.6 35.0 - 47.0 %    MCV 99 78 - 100 fL    MCH 31.7 26.5 - 33.0 pg    MCHC 32.1 31.5 - 36.5 g/dL    RDW 13.3 10.0 - 15.0 %    Platelet Count 238 150 - 450 10e3/uL    % Neutrophils 70 %    % Lymphocytes 20 %    % Monocytes 7 %    % Eosinophils 1 %    % Basophils 1 %    % Immature Granulocytes 1 %    NRBCs per 100 WBC 0 <1 /100    Absolute Neutrophils 6.4 1.6 - 8.3 10e3/uL    Absolute Lymphocytes 1.8 0.8 - 5.3 10e3/uL    Absolute Monocytes 0.6 0.0 - 1.3 10e3/uL    Absolute Eosinophils 0.1 0.0 - 0.7 10e3/uL    Absolute Basophils 0.1 0.0 - 0.2 10e3/uL    Absolute Immature Granulocytes 0.1  <=0.4 10e3/uL    Absolute NRBCs 0.0 10e3/uL   Troponin T, High Sensitivity (now)   Result Value Ref Range    Troponin T, High Sensitivity 35 (H) <=14 ng/L   Symptomatic Influenza A/B & SARS-CoV2 (COVID-19) Virus PCR Multiplex Nasopharyngeal    Specimen: Nasopharyngeal; Swab   Result Value Ref Range    Influenza A PCR Negative Negative    Influenza B PCR Negative Negative    RSV PCR Negative Negative    SARS CoV2 PCR Negative Negative   Nt probnp inpatient   Result Value Ref Range    N terminal Pro BNP Inpatient 290 0 - 1,800 pg/mL       RADIOLOGY:  I have independently reviewed and interpreted the above imaging, pending the final radiology read.  Chest XR,  PA & LAT   Final Result   IMPRESSION: Lungs are clear. Heart and pulmonary vascularity are normal. No signs of acute disease. Changes of DISH in the thoracic spine. Prior cholecystectomy.          EKG:    Normal sinus rhythm at 61 bpm.  Left bundle branch block.  No signs of acute ischemia.   ms,  ms.    I have independently reviewed and interpreted this EKG          I, Antonia Woods, am serving as a scribe to document services personally performed by Dr. Li based on my observation and the provider's statements to me. I, Shad Li, DO attest that Antonia Woods is acting in a scribe capacity, has observed my performance of the services and has documented them in accordance with my direction.    Shad Li, DO  Emergency Medicine  [facility] EMERGENCY DEPARTMENT  [facility] [facility] [address]  [phone]  Dept: [phone]     Shad Li MD  01/13/23 6647

## 2024-06-14 LAB
ALBUMIN SERPL-MCNC: 4.4 G/DL (ref 3.9–4.9)
ALBUMIN/GLOB SERPL: 1.6 {RATIO}
ALP SERPL-CCNC: 84 IU/L (ref 44–121)
ALT SERPL-CCNC: 15 IU/L (ref 0–32)
AST SERPL-CCNC: 17 IU/L (ref 0–40)
BASOPHILS # BLD AUTO: 0.1 X10E3/UL (ref 0–0.2)
BASOPHILS NFR BLD AUTO: 1 %
BILIRUB SERPL-MCNC: 0.2 MG/DL (ref 0–1.2)
BUN SERPL-MCNC: 21 MG/DL (ref 8–27)
BUN/CREAT SERPL: 30 (ref 12–28)
CALCIUM SERPL-MCNC: 9.8 MG/DL (ref 8.7–10.3)
CHLORIDE SERPL-SCNC: 103 MMOL/L (ref 96–106)
CHOLEST SERPL-MCNC: 198 MG/DL (ref 100–199)
CO2 SERPL-SCNC: 24 MMOL/L (ref 20–29)
CREAT SERPL-MCNC: 0.69 MG/DL (ref 0.57–1)
EGFRCR SERPLBLD CKD-EPI 2021: 97 ML/MIN/1.73
EOSINOPHIL # BLD AUTO: 0.6 X10E3/UL (ref 0–0.4)
EOSINOPHIL NFR BLD AUTO: 5 %
ERYTHROCYTE [DISTWIDTH] IN BLOOD BY AUTOMATED COUNT: 12.8 % (ref 11.7–15.4)
GLOBULIN SER CALC-MCNC: 2.8 G/DL (ref 1.5–4.5)
GLUCOSE SERPL-MCNC: 100 MG/DL (ref 70–99)
HBA1C MFR BLD: 5.9 % (ref 4.8–5.6)
HCT VFR BLD AUTO: 46.2 % (ref 34–46.6)
HDLC SERPL-MCNC: 44 MG/DL
HGB BLD-MCNC: 15.1 G/DL (ref 11.1–15.9)
IMM GRANULOCYTES # BLD AUTO: 0 X10E3/UL (ref 0–0.1)
IMM GRANULOCYTES NFR BLD AUTO: 0 %
LDLC SERPL CALC-MCNC: 128 MG/DL (ref 0–99)
LYMPHOCYTES # BLD AUTO: 4 X10E3/UL (ref 0.7–3.1)
LYMPHOCYTES NFR BLD AUTO: 36 %
MCH RBC QN AUTO: 29.9 PG (ref 26.6–33)
MCHC RBC AUTO-ENTMCNC: 32.7 G/DL (ref 31.5–35.7)
MCV RBC AUTO: 92 FL (ref 79–97)
MONOCYTES # BLD AUTO: 0.9 X10E3/UL (ref 0.1–0.9)
MONOCYTES NFR BLD AUTO: 8 %
NEUTROPHILS # BLD AUTO: 5.5 X10E3/UL (ref 1.4–7)
NEUTROPHILS NFR BLD AUTO: 50 %
PLATELET # BLD AUTO: 336 X10E3/UL (ref 150–450)
POTASSIUM SERPL-SCNC: 4.3 MMOL/L (ref 3.5–5.2)
PROT SERPL-MCNC: 7.2 G/DL (ref 6–8.5)
RBC # BLD AUTO: 5.05 X10E6/UL (ref 3.77–5.28)
SODIUM SERPL-SCNC: 142 MMOL/L (ref 134–144)
TRIGL SERPL-MCNC: 143 MG/DL (ref 0–149)
TSH SERPL DL<=0.005 MIU/L-ACNC: 2.42 UIU/ML (ref 0.45–4.5)
VLDLC SERPL CALC-MCNC: 26 MG/DL (ref 5–40)
WBC # BLD AUTO: 11.1 X10E3/UL (ref 3.4–10.8)

## 2024-06-15 LAB
HCV RNA SERPL NAA+PROBE-ACNC: NORMAL IU/ML
IMP & REVIEW OF LAB RESULTS: NORMAL
TEST INFORMATION: NORMAL

## 2024-06-17 ENCOUNTER — CLINICAL DOCUMENTATION (OUTPATIENT)
Facility: CLINIC | Age: 64
End: 2024-06-17

## 2024-06-17 NOTE — PROGRESS NOTES
denosumab (PROLIA) 60 MG/ML SOSY SC injection  Electronic Prior Authorization not supported. Submit via other methods.      Due to the clinical information needed, prior authorization requests submitted by fax are not accepted for this program.

## 2024-06-18 ENCOUNTER — HOSPITAL ENCOUNTER (OUTPATIENT)
Facility: HOSPITAL | Age: 64
Discharge: HOME | End: 2024-06-21
Attending: INTERNAL MEDICINE
Payer: COMMERCIAL

## 2024-06-18 VITALS — BODY MASS INDEX: 31.38 KG/M2 | WEIGHT: 199.96 LBS | HEIGHT: 67 IN

## 2024-06-18 DIAGNOSIS — Z12.31 ENCOUNTER FOR SCREENING MAMMOGRAM FOR MALIGNANT NEOPLASM OF BREAST: ICD-10-CM

## 2024-06-18 PROCEDURE — 77063 BREAST TOMOSYNTHESIS BI: CPT

## 2024-07-17 ENCOUNTER — HOSPITAL ENCOUNTER (OUTPATIENT)
Facility: HOSPITAL | Age: 64
Discharge: HOME | End: 2024-07-20

## 2024-07-17 DIAGNOSIS — F17.210 CIGARETTE NICOTINE DEPENDENCE WITHOUT COMPLICATION: ICD-10-CM

## 2024-07-17 DIAGNOSIS — F17.210 SMOKING GREATER THAN 40 PACK YEARS: ICD-10-CM

## 2024-09-10 ENCOUNTER — OFFICE VISIT (OUTPATIENT)
Facility: CLINIC | Age: 64
End: 2024-09-10
Payer: COMMERCIAL

## 2024-09-10 ENCOUNTER — TELEPHONE (OUTPATIENT)
Facility: CLINIC | Age: 64
End: 2024-09-10

## 2024-09-10 VITALS
WEIGHT: 200 LBS | HEART RATE: 77 BPM | OXYGEN SATURATION: 96 % | TEMPERATURE: 98.5 F | BODY MASS INDEX: 31.39 KG/M2 | HEIGHT: 67 IN | RESPIRATION RATE: 15 BRPM | DIASTOLIC BLOOD PRESSURE: 70 MMHG | SYSTOLIC BLOOD PRESSURE: 130 MMHG

## 2024-09-10 DIAGNOSIS — E66.9 OBESITY (BMI 30-39.9): Primary | ICD-10-CM

## 2024-09-10 DIAGNOSIS — R73.03 PREDIABETES: ICD-10-CM

## 2024-09-10 DIAGNOSIS — M54.50 CHRONIC MIDLINE LOW BACK PAIN WITHOUT SCIATICA: ICD-10-CM

## 2024-09-10 DIAGNOSIS — N32.81 OAB (OVERACTIVE BLADDER): ICD-10-CM

## 2024-09-10 DIAGNOSIS — Z71.3 WEIGHT LOSS COUNSELING, ENCOUNTER FOR: ICD-10-CM

## 2024-09-10 DIAGNOSIS — Z23 NEEDS FLU SHOT: ICD-10-CM

## 2024-09-10 DIAGNOSIS — G89.29 CHRONIC MIDLINE LOW BACK PAIN WITHOUT SCIATICA: ICD-10-CM

## 2024-09-10 PROCEDURE — 99213 OFFICE O/P EST LOW 20 MIN: CPT | Performed by: NURSE PRACTITIONER

## 2024-09-10 RX ORDER — MIRABEGRON 25 MG/1
25 TABLET, FILM COATED, EXTENDED RELEASE ORAL DAILY
COMMUNITY
Start: 2024-08-18

## 2024-09-10 RX ORDER — SEMAGLUTIDE 0.5 MG/.5ML
0.5 INJECTION, SOLUTION SUBCUTANEOUS
Qty: 2 ML | Refills: 2 | Status: SHIPPED | OUTPATIENT
Start: 2024-10-10 | End: 2025-01-08

## 2024-09-10 RX ORDER — SEMAGLUTIDE 0.25 MG/.5ML
0.25 INJECTION, SOLUTION SUBCUTANEOUS
Qty: 2 ML | Refills: 0 | Status: SHIPPED | OUTPATIENT
Start: 2024-09-10 | End: 2024-10-10

## 2024-09-10 SDOH — ECONOMIC STABILITY: FOOD INSECURITY: WITHIN THE PAST 12 MONTHS, THE FOOD YOU BOUGHT JUST DIDN'T LAST AND YOU DIDN'T HAVE MONEY TO GET MORE.: NEVER TRUE

## 2024-09-10 SDOH — ECONOMIC STABILITY: INCOME INSECURITY: HOW HARD IS IT FOR YOU TO PAY FOR THE VERY BASICS LIKE FOOD, HOUSING, MEDICAL CARE, AND HEATING?: NOT HARD AT ALL

## 2024-09-10 SDOH — ECONOMIC STABILITY: FOOD INSECURITY: WITHIN THE PAST 12 MONTHS, YOU WORRIED THAT YOUR FOOD WOULD RUN OUT BEFORE YOU GOT MONEY TO BUY MORE.: NEVER TRUE

## 2024-09-10 ASSESSMENT — PATIENT HEALTH QUESTIONNAIRE - PHQ9
SUM OF ALL RESPONSES TO PHQ QUESTIONS 1-9: 0
2. FEELING DOWN, DEPRESSED OR HOPELESS: NOT AT ALL
SUM OF ALL RESPONSES TO PHQ QUESTIONS 1-9: 0
SUM OF ALL RESPONSES TO PHQ9 QUESTIONS 1 & 2: 0
SUM OF ALL RESPONSES TO PHQ QUESTIONS 1-9: 0
1. LITTLE INTEREST OR PLEASURE IN DOING THINGS: NOT AT ALL
SUM OF ALL RESPONSES TO PHQ QUESTIONS 1-9: 0

## 2024-09-10 NOTE — TELEPHONE ENCOUNTER
The first six are covered but they will need a prior auth  First three needs co pay   bottom three no co pay  Pt wouldn't mind paying a co pay if needed

## 2024-09-11 ENCOUNTER — CLINICAL DOCUMENTATION (OUTPATIENT)
Facility: CLINIC | Age: 64
End: 2024-09-11

## 2024-09-15 NOTE — PATIENT INSTRUCTIONS
Aim to eat 3 meals and 1 snack. Try eating every 4 hours also. Healthy food choices. Healthy snacks: 
Fruit- 1/2 cup per serving Vegetables-1 cup per serving Sugar-Free or Low-Carb branded snacks Lean protein- chicken, turkey, fish, deer, organic-fat free beef (in moderation) Jerky-beef, chicken, or turkey Oatmeal 
 
Drink mostly water, aiming for 1 gallon a day, but at least 10 glasses/day. May add lemon, lime, cucumber, or citrus fruit to water to help with digestion. Joint Aspiration: Care Instructions Your Care Instructions During a joint aspiration, a doctor uses a needle to take fluid out of your joint. This might be done to test the fluid for infection or to find a cause for a joint problem. These problems may include bleeding, infection, gout, or pseudogout. Sometimes fluid is taken out to relieve pressure and pain from too much fluid in the joint. The area where the needle is inserted may be numbed before the needle is put in. Then the needle is slowly put into the joint. A syringe attached to the needle is used to remove fluid. The fluid may be put in tubes or containers and sent to the lab. Sometimes a shot of steroid medicine is also given into the joint. This can help relieve inflammation and pain. It can also help prevent the fluid from building up again. Follow-up care is a key part of your treatment and safety. Be sure to make and go to all appointments, and call your doctor if you are having problems. It's also a good idea to know your test results and keep a list of the medicines you take. How can you care for yourself at home? · If you have bandages over the area, keep them clean and dry. You may remove them when your doctor tells you to. · Put ice or a cold pack on the area for 10 to 20 minutes at a time. Put a thin cloth between the ice and your skin.  
· Ask your doctor if you can take an over-the-counter pain medicine, such as acetaminophen (Tylenol), ibuprofen (Advil, Motrin), or naproxen (Aleve). Be safe with medicines. Read and follow all instructions on the label. · Avoid strenuous activities for several days, especially those that put stress on the area where the needle was put in. When should you call for help? Call your doctor now or seek immediate medical care if: 
  · You have symptoms of infection, such as: 
¨ Increased pain, swelling, warmth, or redness around the area. ¨ Red streaks leading from the area. ¨ Pus draining from the area. ¨ A fever.  
 Watch closely for changes in your health, and be sure to contact your doctor if you have any problems. Where can you learn more? Go to http://amos-chong.info/. Enter A013 in the search box to learn more about \"Joint Aspiration: Care Instructions. \" Current as of: October 4, 2017 Content Version: 11.7 © 3062-9245 [employer]. Care instructions adapted under license by [employer] (which disclaims liability or warranty for this information). If you have questions about a medical condition or this instruction, always ask your healthcare professional. [place] [address] any warranty or liability for your use of this information. Patient requests all Lab, Cardiology, and Radiology Results on their Discharge Instructions

## 2024-10-04 ENCOUNTER — OFFICE VISIT (OUTPATIENT)
Facility: CLINIC | Age: 64
End: 2024-10-04

## 2024-10-04 VITALS
DIASTOLIC BLOOD PRESSURE: 79 MMHG | OXYGEN SATURATION: 98 % | HEIGHT: 67 IN | BODY MASS INDEX: 30.61 KG/M2 | HEART RATE: 77 BPM | SYSTOLIC BLOOD PRESSURE: 122 MMHG | WEIGHT: 195 LBS | RESPIRATION RATE: 18 BRPM | TEMPERATURE: 97 F

## 2024-10-04 DIAGNOSIS — H02.403 DROOPY EYELID, BILATERAL: ICD-10-CM

## 2024-10-04 DIAGNOSIS — Z01.818 PRE-OP EXAM: ICD-10-CM

## 2024-10-04 DIAGNOSIS — H54.7: Primary | ICD-10-CM

## 2024-10-04 NOTE — PATIENT INSTRUCTIONS
May also try Delsym, Phenylephrine, or Chlor-Trimetron for symptoms while using nasal spray and Allegra (fexofenadine).    Use your nasal spray NIGHTLY before bed, even if you don't have symptoms. It will take at least 10 DAYS to provide relief. Don't stop using until the allergens or weather has settled or you usually have no symptoms. For example, most people have spring or summer allergies, therefore starting about 2-4 week before the season change is advised. Other people have winter or fall allergies and they need to start at a different time. Living here in [place], you may benefit from year around use, as this is not harming anything and monique likely help with several of your symptoms to include, post-nasal drip, dry cough, congestion, sinus pressure, runny nose, and watery/itchy eyes.    Try using the nasal spray by holding the tip at the entry to your nostril, do not insert it deeply. Spray once in each nostril (no SNIFFING),THEN IMMEDIATELY pinch your nose with your index finger and thumb leaning forward while breathing out of your mouth for 15-30 seconds. You may repeat for a second spray each nostril if needed.

## 2024-10-04 NOTE — PROGRESS NOTES
Pt is here for   Chief Complaint   Patient presents with    Pre-op Exam     10/23/2024 for eye surgery      \"Have you been to the ER, urgent care clinic since your last visit?  Hospitalized since your last visit?\"    NO    “Have you seen or consulted any other health care providers outside our system since your last visit?”    NO     “Have you had a pap smear?”    NO    No cervical cancer screening on file             
findings or movement disorder noted  Neck-normal C-spine, no tenderness, full ROM without pain      No results found for this visit on 10/04/24.    Assessment/Plan:  MEDICALLY STABLE FOR EYELID/BROW SURGERY  Follow-up and Dispositions    Return for Keep appt as scheduled.         ICD-10-CM    1. Loss of lateral visual fields  H54.7       2. Pre-op exam  Z01.818       3. Droopy eyelid, bilateral  H02.403

## 2024-10-09 DIAGNOSIS — E66.9 OBESITY (BMI 30-39.9): ICD-10-CM

## 2024-10-09 DIAGNOSIS — R73.03 PREDIABETES: ICD-10-CM

## 2024-10-09 RX ORDER — SEMAGLUTIDE 0.25 MG/.5ML
0.25 INJECTION, SOLUTION SUBCUTANEOUS
OUTPATIENT
Start: 2024-10-09 | End: 2024-11-08

## 2024-10-31 ENCOUNTER — OFFICE VISIT (OUTPATIENT)
Facility: CLINIC | Age: 64
End: 2024-10-31

## 2024-10-31 VITALS
DIASTOLIC BLOOD PRESSURE: 86 MMHG | TEMPERATURE: 98 F | HEART RATE: 77 BPM | WEIGHT: 196.9 LBS | SYSTOLIC BLOOD PRESSURE: 132 MMHG | RESPIRATION RATE: 16 BRPM | OXYGEN SATURATION: 97 % | HEIGHT: 67 IN | BODY MASS INDEX: 30.9 KG/M2

## 2024-10-31 DIAGNOSIS — Z71.3 WEIGHT LOSS COUNSELING, ENCOUNTER FOR: ICD-10-CM

## 2024-10-31 DIAGNOSIS — E66.9 OBESITY (BMI 30-39.9): Primary | ICD-10-CM

## 2024-10-31 RX ORDER — TIRZEPATIDE 5 MG/.5ML
5 INJECTION, SOLUTION SUBCUTANEOUS WEEKLY
Qty: 2 ML | Refills: 2 | Status: SHIPPED | OUTPATIENT
Start: 2024-10-31

## 2024-10-31 NOTE — PROGRESS NOTES
Pt is here for   Chief Complaint   Patient presents with    Follow-up     BMI/wt loss... been out of wegovy for 3 weeks, insurance suggest zepbound      \"Have you been to the ER, urgent care clinic since your last visit?  Hospitalized since your last visit?\"    NO    “Have you seen or consulted any other health care providers outside our system since your last visit?”    NO     “Have you had a pap smear?”    NO    No cervical cancer screening on file             
note.  -- Marianna Braxton NP

## 2024-11-01 ENCOUNTER — CLINICAL DOCUMENTATION (OUTPATIENT)
Facility: CLINIC | Age: 64
End: 2024-11-01

## 2024-11-01 NOTE — PROGRESS NOTES
Approved  PA Detail   Prior authorization approved  Payer: [employer] Case ID: [id number]  Approval Details    Authorization number: CaseId:[id number]  Authorized from October 31, 2024 to June 29, 2025    tirzepatide-weight management (ZEPBOUND) 5 MG/0.5ML SOAJ subCUTAneous auto-injector pen  Inject 5 mg into the skin once a week

## 2024-11-20 ENCOUNTER — PATIENT MESSAGE (OUTPATIENT)
Facility: CLINIC | Age: 64
End: 2024-11-20

## 2024-11-20 DIAGNOSIS — R11.0 NAUSEA: Primary | ICD-10-CM

## 2024-11-20 RX ORDER — ONDANSETRON 4 MG/1
4 TABLET, ORALLY DISINTEGRATING ORAL 3 TIMES DAILY PRN
Qty: 30 TABLET | Refills: 2 | Status: SHIPPED | OUTPATIENT
Start: 2024-11-20

## 2024-12-13 ENCOUNTER — HOSPITAL ENCOUNTER (OUTPATIENT)
Facility: HOSPITAL | Age: 64
Discharge: HOME | End: 2024-12-16
Payer: MEDICARE

## 2024-12-13 ENCOUNTER — TRANSCRIBE ORDERS (OUTPATIENT)
Facility: HOSPITAL | Age: 64
End: 2024-12-13

## 2024-12-13 DIAGNOSIS — M47.894 OTHER SPONDYLOSIS, THORACIC REGION: ICD-10-CM

## 2024-12-13 DIAGNOSIS — M47.894 OTHER SPONDYLOSIS, THORACIC REGION: Primary | ICD-10-CM

## 2024-12-13 PROCEDURE — 72072 X-RAY EXAM THORAC SPINE 3VWS: CPT

## 2025-02-05 SDOH — ECONOMIC STABILITY: FOOD INSECURITY: WITHIN THE PAST 12 MONTHS, THE FOOD YOU BOUGHT JUST DIDN'T LAST AND YOU DIDN'T HAVE MONEY TO GET MORE.: NEVER TRUE

## 2025-02-05 SDOH — ECONOMIC STABILITY: INCOME INSECURITY: IN THE LAST 12 MONTHS, WAS THERE A TIME WHEN YOU WERE NOT ABLE TO PAY THE MORTGAGE OR RENT ON TIME?: NO

## 2025-02-05 SDOH — ECONOMIC STABILITY: FOOD INSECURITY: WITHIN THE PAST 12 MONTHS, YOU WORRIED THAT YOUR FOOD WOULD RUN OUT BEFORE YOU GOT MONEY TO BUY MORE.: NEVER TRUE

## 2025-02-05 SDOH — ECONOMIC STABILITY: TRANSPORTATION INSECURITY
IN THE PAST 12 MONTHS, HAS THE LACK OF TRANSPORTATION KEPT YOU FROM MEDICAL APPOINTMENTS OR FROM GETTING MEDICATIONS?: NO

## 2025-02-06 ENCOUNTER — OFFICE VISIT (OUTPATIENT)
Facility: CLINIC | Age: 65
End: 2025-02-06

## 2025-02-06 VITALS
HEIGHT: 67 IN | SYSTOLIC BLOOD PRESSURE: 107 MMHG | RESPIRATION RATE: 16 BRPM | HEART RATE: 91 BPM | OXYGEN SATURATION: 97 % | DIASTOLIC BLOOD PRESSURE: 66 MMHG | TEMPERATURE: 98.5 F | WEIGHT: 194 LBS | BODY MASS INDEX: 30.45 KG/M2

## 2025-02-06 DIAGNOSIS — Z87.891 PERSONAL HISTORY OF TOBACCO USE: ICD-10-CM

## 2025-02-06 DIAGNOSIS — M80.00XD OSTEOPOROSIS WITH PATHOLOGICAL FRACTURE WITH ROUTINE HEALING, SUBSEQUENT ENCOUNTER: ICD-10-CM

## 2025-02-06 DIAGNOSIS — Z00.00 WELCOME TO MEDICARE PREVENTIVE VISIT: Primary | ICD-10-CM

## 2025-02-06 DIAGNOSIS — S92.414A CLOSED NONDISPLACED FRACTURE OF PROXIMAL PHALANX OF RIGHT GREAT TOE, INITIAL ENCOUNTER: ICD-10-CM

## 2025-02-06 DIAGNOSIS — F17.210 SMOKING GREATER THAN 20 PACK YEARS: ICD-10-CM

## 2025-02-06 DIAGNOSIS — J01.00 ACUTE NON-RECURRENT MAXILLARY SINUSITIS: ICD-10-CM

## 2025-02-06 DIAGNOSIS — F40.240 CLAUSTROPHOBIA: ICD-10-CM

## 2025-02-06 RX ORDER — ACETAMINOPHEN AND CODEINE PHOSPHATE 300; 30 MG/1; MG/1
1 TABLET ORAL DAILY PRN
COMMUNITY

## 2025-02-06 RX ORDER — METHOCARBAMOL 750 MG/1
TABLET, FILM COATED ORAL
COMMUNITY
Start: 2025-02-03

## 2025-02-06 RX ORDER — PREDNISONE 20 MG/1
20 TABLET ORAL DAILY
Qty: 5 TABLET | Refills: 0 | Status: SHIPPED | OUTPATIENT
Start: 2025-02-06 | End: 2025-02-11

## 2025-02-06 RX ORDER — AZITHROMYCIN 250 MG/1
250 TABLET, FILM COATED ORAL SEE ADMIN INSTRUCTIONS
Qty: 6 TABLET | Refills: 0 | Status: SHIPPED | OUTPATIENT
Start: 2025-02-06 | End: 2025-02-11

## 2025-02-06 RX ORDER — LORAZEPAM 0.5 MG/1
TABLET ORAL
Qty: 2 TABLET | Refills: 0 | Status: SHIPPED | OUTPATIENT
Start: 2025-02-06 | End: 2025-04-07

## 2025-02-06 ASSESSMENT — PATIENT HEALTH QUESTIONNAIRE - PHQ9
1. LITTLE INTEREST OR PLEASURE IN DOING THINGS: NOT AT ALL
SUM OF ALL RESPONSES TO PHQ QUESTIONS 1-9: 0
2. FEELING DOWN, DEPRESSED OR HOPELESS: NOT AT ALL
SUM OF ALL RESPONSES TO PHQ9 QUESTIONS 1 & 2: 0
SUM OF ALL RESPONSES TO PHQ QUESTIONS 1-9: 0

## 2025-02-06 ASSESSMENT — LIFESTYLE VARIABLES
HOW OFTEN DO YOU HAVE A DRINK CONTAINING ALCOHOL: NEVER
HOW MANY STANDARD DRINKS CONTAINING ALCOHOL DO YOU HAVE ON A TYPICAL DAY: PATIENT DOES NOT DRINK

## 2025-02-06 NOTE — PROGRESS NOTES
Medicare Annual Wellness Visit    Jennifer Baker is here for Follow-up (3 months BMI/ wt loss), Medicare AWV, and Cough (With congestion x 1 week )    Assessment & Plan  1. Sinusitis.  Symptoms include yellowish nasal drainage, right ear pain, and cough persisting for over a week. Over-the-counter decongestants such as phenylephrine or Sudafed are recommended to alleviate congestion. A prescription for Zithromax (azithromycin) 250 mg, to be taken as 2 tablets on the first day followed by 1 tablet daily for the subsequent 4 days, has been provided. Additionally, a prescription for prednisone 20 mg, to be taken once daily for 5 days, has been issued.    2. Claustrophobia.  A low-dose CT scan has been ordered. A prescription for Ativan (lorazepam) 1 mg, to be taken 30 to 60 minutes prior to the CT scan, has been provided to manage claustrophobia.    3. Fractured big toe.  The fracture occurred on January 20, 2024, and was confirmed by an orthopedic doctor. She is advised to continue wearing shoes for support. If pain persists, range of motion is limited, or swelling continues beyond March 20, 2024, a follow-up with the orthopedic doctor is recommended.    4. Back pain.  She is in the process of receiving injections for her back pain, with the next set scheduled for March 6, 2024. She is advised to engage in continuous activity for at least 10 minutes daily, such as yoga or chair exercises, to help alleviate back pain.    5. Weight management.  She has stopped taking weight loss medication due to insurance coverage issues. She is advised to follow the dietitian's recommendations and maintain a healthy diet. Regular exercise, including at least 10 minutes of continuous activity daily, is recommended.    6. Medication management.  A prescription for Prolia has been reordered.    Follow-up  The patient will follow up in June 2024 for annual labs.    Welcome to Medicare preventive visit  Smoking greater than 20 pack years  - 
Pt is here for   Chief Complaint   Patient presents with    Follow-up     3 months BMI/ wt loss    Medicare AWV    Cough     With congestion x 1 week      \"Have you been to the ER, urgent care clinic since your last visit?  Hospitalized since your last visit?\"    YES - When: approximately January 23rd 2025 ago.  Where and Why: [facility] for [facility] great [facility].    “Have you seen or consulted any other health care providers outside our system since your last visit?”    NO     “Have you had a pap smear?”    NO    No cervical cancer screening on file             
No

## 2025-02-06 NOTE — PATIENT INSTRUCTIONS
EXERCISING DAILY for AT LEAST 10 minutes IS extremely IMPORTANT to help manage your pain. Think of it as taking one of your meds for pain. Continuous movement for up to 1 hour is most helpful on DAILY basis. This has to be a NON-NEGOTIABLE. Whether you feel like it or not. This will help with the FATIGUE and overall PAIN. Please give it a try.     Also change up your routine, walking/jogging one day, take an online class, go cycling, go to the gym for a class or get on a machine, go to [employer]. The DAILY MOVEMENT is PARAMOUNT to help LOWER your overall pain level.     Lookup FIBROCISE on YOUTUBE to follow for movement routines also.        Learning About Being Active as an Older Adult  Why is being active important as you get older?     Being active is one of the best things you can do for your health. And it's never too late to start. Being active--or getting active, if you aren't already--has definite benefits. It can:  Give you more energy,  Keep your mind sharp.  Improve balance to reduce your risk of falls.  Help you manage chronic illness with fewer medicines.  No matter how old you are, how fit you are, or what health problems you have, there is a form of activity that will work for you. And the more physical activity you can do, the better your overall health will be.  What kinds of activity can help you stay healthy?  Being more active will make your daily activities easier. Physical activity includes planned exercise and things you do in daily life. There are four types of activity:  Aerobic.  Doing aerobic activity makes your heart and lungs strong.  Includes walking, dancing, and gardening.  Aim for at least 2½ hours spread throughout the week.  It improves your energy and can help you sleep better.  Muscle-strengthening.  This type of activity can help maintain muscle and strengthen bones.  Includes climbing stairs, using resistance bands, and lifting or carrying heavy loads.  Aim for at least

## 2025-02-07 ENCOUNTER — TELEPHONE (OUTPATIENT)
Facility: CLINIC | Age: 65
End: 2025-02-07

## 2025-02-07 DIAGNOSIS — M80.00XD OSTEOPOROSIS WITH PATHOLOGICAL FRACTURE WITH ROUTINE HEALING, SUBSEQUENT ENCOUNTER: ICD-10-CM

## 2025-02-07 NOTE — TELEPHONE ENCOUNTER
Pt called stating HR is requesting a return to work letter. Pt wanted to return today but they sent her home. She is hoping to return tomorrow 2/8/25.

## 2025-02-28 RX ORDER — RESVER/WINE/BFL/GRPSD/PC/C/POM 200MG-60MG
CAPSULE ORAL
Qty: 90 TABLET | Refills: 3 | Status: SHIPPED | OUTPATIENT
Start: 2025-02-28

## 2025-03-18 ENCOUNTER — HOSPITAL ENCOUNTER (OUTPATIENT)
Facility: HOSPITAL | Age: 65
Discharge: HOME | End: 2025-03-21
Payer: MEDICARE

## 2025-03-18 DIAGNOSIS — Z87.891 PERSONAL HISTORY OF TOBACCO USE: ICD-10-CM

## 2025-03-18 DIAGNOSIS — F17.210 SMOKING GREATER THAN 20 PACK YEARS: ICD-10-CM

## 2025-03-18 DIAGNOSIS — R91.1 PULMONARY NODULE LESS THAN 6 MM DETERMINED BY COMPUTED TOMOGRAPHY OF LUNG: Primary | ICD-10-CM

## 2025-03-18 PROCEDURE — 71271 CT THORAX LUNG CANCER SCR C-: CPT

## 2025-03-20 ENCOUNTER — RESULTS FOLLOW-UP (OUTPATIENT)
Facility: HOSPITAL | Age: 65
End: 2025-03-20

## 2025-03-20 PROBLEM — R91.1 PULMONARY NODULE LESS THAN 6 MM DETERMINED BY COMPUTED TOMOGRAPHY OF LUNG: Status: ACTIVE | Noted: 2025-03-20

## 2025-04-22 ENCOUNTER — HOSPITAL ENCOUNTER (OUTPATIENT)
Facility: HOSPITAL | Age: 65
Discharge: HOME | End: 2025-04-25
Payer: MEDICARE

## 2025-04-22 VITALS — BODY MASS INDEX: 30.61 KG/M2 | WEIGHT: 195 LBS

## 2025-04-22 DIAGNOSIS — K76.0 STEATOSIS OF LIVER: ICD-10-CM

## 2025-04-22 DIAGNOSIS — R10.11 RUQ PAIN: ICD-10-CM

## 2025-04-22 DIAGNOSIS — K21.9 GASTROESOPHAGEAL REFLUX DISEASE, UNSPECIFIED WHETHER ESOPHAGITIS PRESENT: ICD-10-CM

## 2025-04-22 PROCEDURE — A9537 TC99M MEBROFENIN: HCPCS | Performed by: PHYSICIAN ASSISTANT

## 2025-04-22 PROCEDURE — 3430000000 HC RX DIAGNOSTIC RADIOPHARMACEUTICAL: Performed by: PHYSICIAN ASSISTANT

## 2025-04-22 PROCEDURE — 6360000004 HC RX CONTRAST MEDICATION: Performed by: PHYSICIAN ASSISTANT

## 2025-04-22 PROCEDURE — 78227 HEPATOBIL SYST IMAGE W/DRUG: CPT

## 2025-04-22 RX ORDER — KIT FOR THE PREPARATION OF TECHNETIUM TC 99M MEBROFENIN 45 MG/10ML
5.1 INJECTION, POWDER, LYOPHILIZED, FOR SOLUTION INTRAVENOUS
Status: COMPLETED | OUTPATIENT
Start: 2025-04-22 | End: 2025-04-22

## 2025-04-22 RX ORDER — SINCALIDE 5 UG/5ML
0.02 INJECTION, POWDER, LYOPHILIZED, FOR SOLUTION INTRAVENOUS ONCE
Status: COMPLETED | OUTPATIENT
Start: 2025-04-22 | End: 2025-04-22

## 2025-04-22 RX ADMIN — SINCALIDE 1.77 MCG: 5 INJECTION, POWDER, LYOPHILIZED, FOR SOLUTION INTRAVENOUS at 10:40

## 2025-04-22 RX ADMIN — MEBROFENIN 5.1 MILLICURIE: 45 INJECTION, POWDER, LYOPHILIZED, FOR SOLUTION INTRAVENOUS at 09:55

## 2025-05-30 RX ORDER — TRAZODONE HYDROCHLORIDE 50 MG/1
TABLET ORAL
Qty: 180 TABLET | Refills: 3 | Status: SHIPPED | OUTPATIENT
Start: 2025-05-30

## 2025-06-12 ENCOUNTER — TRANSCRIBE ORDERS (OUTPATIENT)
Facility: HOSPITAL | Age: 65
End: 2025-06-12

## 2025-06-12 ENCOUNTER — OFFICE VISIT (OUTPATIENT)
Facility: CLINIC | Age: 65
End: 2025-06-12
Payer: MEDICARE

## 2025-06-12 VITALS
DIASTOLIC BLOOD PRESSURE: 76 MMHG | RESPIRATION RATE: 16 BRPM | HEIGHT: 67 IN | BODY MASS INDEX: 30.29 KG/M2 | HEART RATE: 80 BPM | OXYGEN SATURATION: 99 % | SYSTOLIC BLOOD PRESSURE: 141 MMHG | TEMPERATURE: 97.5 F | WEIGHT: 193 LBS

## 2025-06-12 DIAGNOSIS — M54.50 CHRONIC MIDLINE LOW BACK PAIN WITHOUT SCIATICA: ICD-10-CM

## 2025-06-12 DIAGNOSIS — Z85.3 HISTORY OF BREAST CANCER: ICD-10-CM

## 2025-06-12 DIAGNOSIS — R73.03 PREDIABETES: ICD-10-CM

## 2025-06-12 DIAGNOSIS — Z87.19: ICD-10-CM

## 2025-06-12 DIAGNOSIS — R91.1 PULMONARY NODULE: ICD-10-CM

## 2025-06-12 DIAGNOSIS — Z12.31 OTHER SCREENING MAMMOGRAM: Primary | ICD-10-CM

## 2025-06-12 DIAGNOSIS — E78.2 MODERATE MIXED HYPERLIPIDEMIA NOT REQUIRING STATIN THERAPY: ICD-10-CM

## 2025-06-12 DIAGNOSIS — K22.70 BARRETT'S ESOPHAGUS WITHOUT DYSPLASIA: ICD-10-CM

## 2025-06-12 DIAGNOSIS — R73.03 PREDIABETES: Primary | ICD-10-CM

## 2025-06-12 DIAGNOSIS — M80.00XD OSTEOPOROSIS WITH PATHOLOGICAL FRACTURE WITH ROUTINE HEALING, SUBSEQUENT ENCOUNTER: ICD-10-CM

## 2025-06-12 DIAGNOSIS — Z13.220 SCREENING FOR LIPID DISORDERS: ICD-10-CM

## 2025-06-12 DIAGNOSIS — G89.29 CHRONIC MIDLINE LOW BACK PAIN WITHOUT SCIATICA: ICD-10-CM

## 2025-06-12 DIAGNOSIS — F17.210 NICOTINE DEPENDENCE, CIGARETTES, UNCOMPLICATED: ICD-10-CM

## 2025-06-12 DIAGNOSIS — R03.0 ELEVATED BP WITHOUT DIAGNOSIS OF HYPERTENSION: ICD-10-CM

## 2025-06-12 DIAGNOSIS — Z13.6 ENCOUNTER FOR SCREENING FOR CARDIOVASCULAR DISORDERS: ICD-10-CM

## 2025-06-12 PROCEDURE — 1123F ACP DISCUSS/DSCN MKR DOCD: CPT | Performed by: NURSE PRACTITIONER

## 2025-06-12 PROCEDURE — 1036F TOBACCO NON-USER: CPT | Performed by: NURSE PRACTITIONER

## 2025-06-12 PROCEDURE — G8399 PT W/DXA RESULTS DOCUMENT: HCPCS | Performed by: NURSE PRACTITIONER

## 2025-06-12 PROCEDURE — G8427 DOCREV CUR MEDS BY ELIG CLIN: HCPCS | Performed by: NURSE PRACTITIONER

## 2025-06-12 PROCEDURE — 3017F COLORECTAL CA SCREEN DOC REV: CPT | Performed by: NURSE PRACTITIONER

## 2025-06-12 PROCEDURE — 1090F PRES/ABSN URINE INCON ASSESS: CPT | Performed by: NURSE PRACTITIONER

## 2025-06-12 PROCEDURE — G8417 CALC BMI ABV UP PARAM F/U: HCPCS | Performed by: NURSE PRACTITIONER

## 2025-06-12 PROCEDURE — 99214 OFFICE O/P EST MOD 30 MIN: CPT | Performed by: NURSE PRACTITIONER

## 2025-06-12 NOTE — PROGRESS NOTES
Jennifer Baker 1960 is a 64 y.o. female, Established patient, here for evaluation of the following chief complaint(s):  4 month follow up (Annual labs, LBP/exercise )      The patient (or guardian, if applicable) and other individuals in attendance with the patient were advised that Artificial Intelligence will be utilized during this visit to record, process the conversation to generate a clinical note, and support improvement of the AI technology. The patient (or guardian, if applicable) and other individuals in attendance at the appointment consented to the use of AI, including the recording. There may be incorrect pronoun references transcribed based on AI determining this from the recorded conversation.     ASSESSMENT/PLAN:  Below is the assessment and plan developed based on review of pertinent history, physical exam, labs, studies, and medications.       Diagnosis Orders   1. Prediabetes  Comprehensive Metabolic Panel    Hemoglobin A1C      2. History of cholecystitis without calculus  Comprehensive Metabolic Panel      3. Lockett's esophagus without dysplasia  CBC with Auto Differential    Comprehensive Metabolic Panel      4. Elevated BP without diagnosis of hypertension        5. Nicotine dependence, cigarettes, uncomplicated        6. Chronic midline low back pain without sciatica        7. Osteoporosis with pathological fracture with routine healing, subsequent encounter        8. History of breast cancer        9. Moderate mixed hyperlipidemia not requiring statin therapy  Lipid Panel    Lipoprotein A (LPA)      10. Encounter for screening for cardiovascular disorders  Lipoprotein A (LPA)      11. Pulmonary nodule            Assessment & Plan  1. Back pain.  - Continues to experience intermittent back pain, using muscle relaxers and pain medication as needed.  - Informed about a new noninvasive therapy involving a booster treatment to stimulate healing from within.  - Advised to discontinue NSAID

## 2025-06-12 NOTE — PATIENT INSTRUCTIONS
I think you would benefit from trying this newer therapy called SOFTWAVE. It uses acoustic sound waves to trigger STEM cell activation and natural healing.    There is a clinic off of [address] you can call to schedule this. It is out of pocket (around $59-99), but can possibly provide long lasting pain relief without taking any more pills.     [facility] ([facility])  [facility] [place] [address] [phone]  Or schedule an appointment online at [employer]     *NOTE: Avoid NSAID use within 2-3 days of session, inflammation is expected and wanted for this therapy to be most effective. NSAIDs= Aleve, Ibuprofen, Motrin, Naproxen, Diclofenac, Meloxicam, BC Powder, Aspirin, Excedrin

## 2025-06-14 LAB
ALBUMIN SERPL-MCNC: 3.8 G/DL (ref 3.5–5)
ALBUMIN/GLOB SERPL: 1.1 (ref 1.1–2.2)
ALP SERPL-CCNC: 100 U/L (ref 45–117)
ALT SERPL-CCNC: 23 U/L (ref 12–78)
ANION GAP SERPL CALC-SCNC: 6 MMOL/L (ref 2–12)
AST SERPL-CCNC: 20 U/L (ref 15–37)
BASOPHILS # BLD: 0.05 K/UL (ref 0–0.1)
BASOPHILS NFR BLD: 0.4 % (ref 0–1)
BILIRUB SERPL-MCNC: 0.2 MG/DL (ref 0.2–1)
BUN SERPL-MCNC: 18 MG/DL (ref 6–20)
BUN/CREAT SERPL: 29 (ref 12–20)
CALCIUM SERPL-MCNC: 9.5 MG/DL (ref 8.5–10.1)
CHLORIDE SERPL-SCNC: 105 MMOL/L (ref 97–108)
CHOLEST SERPL-MCNC: 190 MG/DL
CO2 SERPL-SCNC: 27 MMOL/L (ref 21–32)
CREAT SERPL-MCNC: 0.63 MG/DL (ref 0.55–1.02)
DIFFERENTIAL METHOD BLD: ABNORMAL
EOSINOPHIL # BLD: 0.21 K/UL (ref 0–0.4)
EOSINOPHIL NFR BLD: 1.7 % (ref 0–7)
ERYTHROCYTE [DISTWIDTH] IN BLOOD BY AUTOMATED COUNT: 13.4 % (ref 11.5–14.5)
EST. AVERAGE GLUCOSE BLD GHB EST-MCNC: 111 MG/DL
GLOBULIN SER CALC-MCNC: 3.6 G/DL (ref 2–4)
GLUCOSE SERPL-MCNC: 96 MG/DL (ref 65–100)
HBA1C MFR BLD: 5.5 % (ref 4–5.6)
HCT VFR BLD AUTO: 45.2 % (ref 35–47)
HDLC SERPL-MCNC: 48 MG/DL
HDLC SERPL: 4 (ref 0–5)
HGB BLD-MCNC: 14.2 G/DL (ref 11.5–16)
IMM GRANULOCYTES # BLD AUTO: 0.04 K/UL (ref 0–0.04)
IMM GRANULOCYTES NFR BLD AUTO: 0.3 % (ref 0–0.5)
LDLC SERPL CALC-MCNC: 117.2 MG/DL (ref 0–100)
LYMPHOCYTES # BLD: 3.55 K/UL (ref 0.8–3.5)
LYMPHOCYTES NFR BLD: 28 % (ref 12–49)
MCH RBC QN AUTO: 29.3 PG (ref 26–34)
MCHC RBC AUTO-ENTMCNC: 31.4 G/DL (ref 30–36.5)
MCV RBC AUTO: 93.2 FL (ref 80–99)
MONOCYTES # BLD: 0.85 K/UL (ref 0–1)
MONOCYTES NFR BLD: 6.7 % (ref 5–13)
NEUTS SEG # BLD: 7.96 K/UL (ref 1.8–8)
NEUTS SEG NFR BLD: 62.9 % (ref 32–75)
NRBC # BLD: 0 K/UL (ref 0–0.01)
NRBC BLD-RTO: 0 PER 100 WBC
PLATELET # BLD AUTO: 297 K/UL (ref 150–400)
PMV BLD AUTO: 11 FL (ref 8.9–12.9)
POTASSIUM SERPL-SCNC: 4 MMOL/L (ref 3.5–5.1)
PROT SERPL-MCNC: 7.4 G/DL (ref 6.4–8.2)
RBC # BLD AUTO: 4.85 M/UL (ref 3.8–5.2)
SODIUM SERPL-SCNC: 138 MMOL/L (ref 136–145)
TRIGL SERPL-MCNC: 124 MG/DL
VLDLC SERPL CALC-MCNC: 24.8 MG/DL
WBC # BLD AUTO: 12.7 K/UL (ref 3.6–11)

## 2025-06-16 ENCOUNTER — RESULTS FOLLOW-UP (OUTPATIENT)
Facility: CLINIC | Age: 65
End: 2025-06-16

## 2025-06-16 LAB — LPA SERPL-SCNC: 9.1 NMOL/L

## 2025-06-18 ENCOUNTER — ANESTHESIA EVENT (OUTPATIENT)
Facility: HOSPITAL | Age: 65
End: 2025-06-18
Payer: MEDICARE

## 2025-06-18 ENCOUNTER — ANESTHESIA (OUTPATIENT)
Facility: HOSPITAL | Age: 65
End: 2025-06-18
Payer: MEDICARE

## 2025-06-18 ENCOUNTER — HOSPITAL ENCOUNTER (OUTPATIENT)
Facility: HOSPITAL | Age: 65
Setting detail: OUTPATIENT SURGERY
Discharge: HOME | End: 2025-06-18
Attending: SPECIALIST | Admitting: SPECIALIST
Payer: MEDICARE

## 2025-06-18 VITALS
TEMPERATURE: 97.7 F | OXYGEN SATURATION: 98 % | BODY MASS INDEX: 31.02 KG/M2 | HEART RATE: 64 BPM | DIASTOLIC BLOOD PRESSURE: 62 MMHG | WEIGHT: 193 LBS | RESPIRATION RATE: 11 BRPM | HEIGHT: 66 IN | SYSTOLIC BLOOD PRESSURE: 130 MMHG

## 2025-06-18 PROCEDURE — 2709999900 HC NON-CHARGEABLE SUPPLY: Performed by: SPECIALIST

## 2025-06-18 PROCEDURE — 6370000000 HC RX 637 (ALT 250 FOR IP): Performed by: REGISTERED NURSE

## 2025-06-18 PROCEDURE — 3700000000 HC ANESTHESIA ATTENDED CARE: Performed by: SPECIALIST

## 2025-06-18 PROCEDURE — 2580000003 HC RX 258: Performed by: SPECIALIST

## 2025-06-18 PROCEDURE — 6360000002 HC RX W HCPCS: Performed by: REGISTERED NURSE

## 2025-06-18 PROCEDURE — 7100000011 HC PHASE II RECOVERY - ADDTL 15 MIN: Performed by: SPECIALIST

## 2025-06-18 PROCEDURE — 3600007502: Performed by: SPECIALIST

## 2025-06-18 PROCEDURE — 7100000010 HC PHASE II RECOVERY - FIRST 15 MIN: Performed by: SPECIALIST

## 2025-06-18 PROCEDURE — 88305 TISSUE EXAM BY PATHOLOGIST: CPT

## 2025-06-18 RX ORDER — SODIUM CHLORIDE 0.9 % (FLUSH) 0.9 %
5-40 SYRINGE (ML) INJECTION EVERY 12 HOURS SCHEDULED
Status: DISCONTINUED | OUTPATIENT
Start: 2025-06-18 | End: 2025-06-18 | Stop reason: HOSPADM

## 2025-06-18 RX ORDER — SODIUM CHLORIDE 9 MG/ML
INJECTION, SOLUTION INTRAVENOUS PRN
Status: DISCONTINUED | OUTPATIENT
Start: 2025-06-18 | End: 2025-06-18 | Stop reason: HOSPADM

## 2025-06-18 RX ORDER — SODIUM CHLORIDE 0.9 % (FLUSH) 0.9 %
5-40 SYRINGE (ML) INJECTION PRN
Status: DISCONTINUED | OUTPATIENT
Start: 2025-06-18 | End: 2025-06-18 | Stop reason: HOSPADM

## 2025-06-18 RX ADMIN — BENZOCAINE 1 SPRAY: 200 SPRAY DENTAL; ORAL; PERIODONTAL at 09:16

## 2025-06-18 RX ADMIN — PROPOFOL 30 MG: 10 INJECTION, EMULSION INTRAVENOUS at 09:17

## 2025-06-18 RX ADMIN — SODIUM CHLORIDE: 0.9 INJECTION, SOLUTION INTRAVENOUS at 08:39

## 2025-06-18 RX ADMIN — PROPOFOL 70 MG: 10 INJECTION, EMULSION INTRAVENOUS at 09:16

## 2025-06-18 RX ADMIN — PROPOFOL 30 MG: 10 INJECTION, EMULSION INTRAVENOUS at 09:19

## 2025-06-18 RX ADMIN — PROPOFOL 30 MG: 10 INJECTION, EMULSION INTRAVENOUS at 09:21

## 2025-06-18 RX ADMIN — LIDOCAINE HYDROCHLORIDE 100 MG: 20 INJECTION, SOLUTION EPIDURAL; INFILTRATION; INTRACAUDAL; PERINEURAL at 09:16

## 2025-06-18 ASSESSMENT — PAIN - FUNCTIONAL ASSESSMENT: PAIN_FUNCTIONAL_ASSESSMENT: NONE - DENIES PAIN

## 2025-06-18 ASSESSMENT — LIFESTYLE VARIABLES: SMOKING_STATUS: 1

## 2025-06-18 NOTE — DISCHARGE INSTRUCTIONS
beverages  If you have not urinated within 8 hours after discharge, please contact your physician  Resume your medications unless otherwise instructed    For 24 hours after general anesthesia or intravenous analgesia / sedation  you may experience:  Drowsiness, dizziness, sleepiness, or confusion  Difficulty remembering or delayed reaction times  Difficulty with your balance, especially while walking, move slowly and carefully, do not make sudden position changes  Difficulty focusing or blurred vision    You may not be aware of slight changes in your behavior and/or your reaction time because of the medication used during and after your procedure.    Report the following to your physician:  Excessive pain, swelling, redness or odor of or around the surgical area  Temperature over 100.5  Nausea and vomiting lasting longer than 4 hours or if unable to take medications  Any signs of decreased circulation or nerve impairment to extremity: change in color, persistent numbness, tingling, coldness or increase pain  Any questions or concerns    IF YOU REPORT TO AN EMERGENCY ROOM, DOCTOR'S OFFICE OR HOSPITAL WITHIN 24 HOURS AFTER YOUR PROCEDURE, BRING THIS SHEET AND YOUR AFTER VISIT SUMMARY WITH YOU AND GIVE IT TO THE PHYSICIAN OR NURSE ATTENDING YOU.

## 2025-06-18 NOTE — PROGRESS NOTES
Endoscopy Case End Note:    ***:  Procedure scope was pre-cleaned, per protocol, at bedside by Moiz.      ***:  Report received from anesthesia - Brittney.  See anesthesia flowsheet for intra-procedure vital signs and events.

## 2025-06-18 NOTE — OP NOTE
Esophagogastroduodenoscopy Procedure Note      Jennifer Baker  1960  [id number]    Indication: Abdominal pain, RUQ     Endoscopist: José Antonio Mosher MD    Referring Provider:  Marianna Braxton APRN - NP    Sedation:  MAC anesthesia Propofol    Procedure Details:  After infomed consent was obtained for the procedure, with all risks and benefits of procedure explained the patient was taken to the endoscopy suite and placed in the left lateral decubitus position.  Following sequential administration of sedation as per above, the endoscope was inserted into the mouth and advanced under direct vision to second portion of the duodenum.  A careful inspection was made as the gastroscope was withdrawn, including a retroflexed view of the proximal stomach; findings and interventions are described below.      Findings:     Esophagus:   The esophageal mucosa in the proximal, mid and distal esophagus is normal.   The squamo-columnar junction is at 39 cm from incisors where the Z-line was noted.  S/P mid esophageal bx.    Stomach:   The gastric mucosa appeared normal s/p bx of the stomach antrum.   The fundus was found to be normal with no lesions noted on retroflexion.     Duodenum:   The bulb and post bulbar mucosa is normal in appearance to the second portion. The duodenal folds appeared normal.  Cold forceps biopsies r/o celiac.     Therapies:  see above    Specimen: Specimens were collected as described and send to the laboratory.           Complications:   None were encountered during the procedure.    EBL: < 10 ml.          Recommendations:   -F/U path  -PPI daily  -will order Ultrasound to r/o gallstones      José Antonio Mosher MD  6/18/2025  9:26 AM

## 2025-06-18 NOTE — H&P
needed 10/4/18  Yes Automatic Reconciliation, Ar   fluticasone (FLONASE) 50 MCG/ACT nasal spray INSTILL 2 SPRAYS IN BOTH NOSTRILS EACH DAY 1/29/21  Yes Automatic Reconciliation, Ar   levocetirizine (XYZAL) 5 MG tablet Take 1 tablet by mouth daily 6/8/17  Yes Automatic Reconciliation, Ar   omeprazole (PRILOSEC) 40 MG delayed release capsule Take 1 capsule by mouth daily   Yes Automatic Reconciliation, Ar   denosumab (PROLIA) 60 MG/ML SOSY SC injection Inject 1 mL into the skin every 6 months  Patient not taking: Reported on 6/17/2025 2/7/25   Marianna Braxton, APRN - NP       Physical Exam:   General: NAD   HEENT: Head: Normocephalic, no lesions, without obvious abnormality.   Heart: regular rate and rhythm, S1, S2 normal, no murmur, click, rub or gallop   Lungs: chest clear, no wheezing, rales, normal symmetric air entry   Abdominal: soft, NT/ND + BS   Neurological: Grossly normal   Extremities: extremities normal, atraumatic, no cyanosis or edema     Findings/Diagnosis: N/V/RUQ pain    Plan of Care/Planned Procedure: EGD

## 2025-06-18 NOTE — PROGRESS NOTES
ARRIVAL INFORMATION:  Verified patient name and date of birth, scheduled procedure, and informed consent.     : Florin (boyfriend) contact number: [phone]  Physician and staff can share information with the .     Receive texts: yes    Belongings with patient include:  Clothing    GI FOCUSED ASSESSMENT:  Neuro: Awake, alert, oriented x4  Respiratory: even and unlabored   GI: soft and non-distended    Education:Reviewed general discharge instructions and  information.   The risks and benefits of the bite block have been explained to patient.  Patient verbalizes understanding.

## 2025-06-18 NOTE — ANESTHESIA POSTPROCEDURE EVALUATION
Department of Anesthesiology  Postprocedure Note    Patient: Jennifer Baker  MRN: [id number]  YOB: 1960  Date of evaluation: 6/18/2025    Procedure Summary       Date: 06/18/25 Room / Location: [facility] ENDO 02 / [facility] ENDOSCOPY    Anesthesia Start: 0912 Anesthesia Stop: 0929    Procedure: ESOPHAGOGASTRODUODENOSCOPY (Upper GI Region) Diagnosis:       Nausea      Right upper quadrant pain      Gastroesophageal reflux disease, unspecified whether esophagitis present      (Nausea [R11.0])      (Right upper quadrant pain [R10.11])      (Gastroesophageal reflux disease, unspecified whether esophagitis present [K21.9])    Surgeons: José Antonio Mosher MD Responsible Provider: Humberto Sin MD    Anesthesia Type: MAC ASA Status: 2            Anesthesia Type: MAC    Aga Phase I: Aga Score: 10    Aga Phase II:      Anesthesia Post Evaluation    Patient location during evaluation: PACU  Patient participation: complete - patient participated  Level of consciousness: sleepy but conscious and responsive to verbal stimuli  Pain score: 1  Airway patency: patent  Nausea & Vomiting: no vomiting and no nausea  Cardiovascular status: blood pressure returned to baseline and hemodynamically stable  Respiratory status: acceptable  Hydration status: stable  Multimodal analgesia pain management approach  Pain management: adequate    No notable events documented.

## 2025-06-18 NOTE — ANESTHESIA PRE PROCEDURE
Department of Anesthesiology  Preprocedure Note       Name:  Jennifer Baker   Age:  64 y.o.  :  1960                                          MRN:  [id number]         Date:  2025      Surgeon: Surgeon(s):  José Antonio Mosher MD    Procedure: Procedure(s):  ESOPHAGOGASTRODUODENOSCOPY    Medications prior to admission:   Prior to Admission medications    Medication Sig Start Date End Date Taking? Authorizing Provider   traZODone (DESYREL) 50 MG tablet TAKE 1 TO 2 TABLETS EVERY NIGHT FOR SLEEP. 25   Fox, Marianna I, APRN - NP   D-5000 125 MCG (5000 UT) TABS tablet TAKE 1 TAB BY MOUTH DAILY. INDICATIONS: LOW VITAMIN D LEVELS 25   Fox, Marianna I APRN - NP   denosumab (PROLIA) 60 MG/ML SOSY SC injection Inject 1 mL into the skin every 6 months  Patient not taking: Reported on 2025   Fox Marianna AMILCAR JADE - NP   acetaminophen-codeine (TYLENOL #3) 300-30 MG per tablet Take 1 tablet by mouth daily as needed for Pain.    Provider, MD Gertrude   methocarbamol (ROBAXIN) 750 MG tablet Take 1 tablet by mouth as needed 2/3/25   Provider, MD Gertrude   diclofenac sodium (VOLTAREN) 1 % GEL Apply 4 g topically 4 times daily as needed 10/4/18   Automatic Reconciliation, Ar   fluticasone (FLONASE) 50 MCG/ACT nasal spray INSTILL 2 SPRAYS IN BOTH NOSTRILS EACH DAY 21   Automatic Reconciliation, Ar   levocetirizine (XYZAL) 5 MG tablet Take 1 tablet by mouth daily 17   Automatic Reconciliation, Ar   omeprazole (PRILOSEC) 40 MG delayed release capsule Take 1 capsule by mouth daily    Automatic Reconciliation, Ar       Current medications:    No current facility-administered medications for this encounter.     Current Outpatient Medications   Medication Sig Dispense Refill   • traZODone (DESYREL) 50 MG tablet TAKE 1 TO 2 TABLETS EVERY NIGHT FOR SLEEP. 180 tablet 3   • D-5000 125 MCG (5000 UT) TABS tablet TAKE 1 TAB BY MOUTH DAILY. INDICATIONS: LOW VITAMIN D LEVELS 90 tablet 3   • denosumab

## 2025-06-18 NOTE — PROGRESS NOTES
Endoscopy Case End Note:    0924:  Procedure scope was pre-cleaned, per protocol, at bedside by GUALBERTO NIELSON      0965:  Report received from anesthesia - LESLEY Severino CRNA.  See anesthesia flowsheet for intra-procedure vital signs and events.

## 2025-08-01 ENCOUNTER — HOSPITAL ENCOUNTER (OUTPATIENT)
Facility: HOSPITAL | Age: 65
Discharge: HOME | End: 2025-08-01
Payer: MEDICARE

## 2025-08-01 DIAGNOSIS — Z12.31 OTHER SCREENING MAMMOGRAM: ICD-10-CM

## 2025-08-01 PROCEDURE — 77067 SCR MAMMO BI INCL CAD: CPT

## 2025-08-01 PROCEDURE — 77063 BREAST TOMOSYNTHESIS BI: CPT

## 2025-09-02 ENCOUNTER — HOSPITAL ENCOUNTER (EMERGENCY)
Facility: HOSPITAL | Age: 65
Discharge: HOME | End: 2025-09-02
Attending: EMERGENCY MEDICINE
Payer: MEDICARE

## 2025-09-02 ENCOUNTER — APPOINTMENT (OUTPATIENT)
Facility: HOSPITAL | Age: 65
End: 2025-09-02
Payer: MEDICARE

## 2025-09-02 VITALS
OXYGEN SATURATION: 99 % | DIASTOLIC BLOOD PRESSURE: 71 MMHG | SYSTOLIC BLOOD PRESSURE: 133 MMHG | RESPIRATION RATE: 20 BRPM | TEMPERATURE: 98.1 F | HEART RATE: 66 BPM | WEIGHT: 197 LBS | BODY MASS INDEX: 31.66 KG/M2 | HEIGHT: 66 IN

## 2025-09-02 DIAGNOSIS — N20.1 URETERIC STONE: Primary | ICD-10-CM

## 2025-09-02 DIAGNOSIS — Z72.0 TOBACCO ABUSE: ICD-10-CM

## 2025-09-02 LAB
ALBUMIN SERPL-MCNC: 3.5 G/DL (ref 3.5–5.2)
ALBUMIN/GLOB SERPL: 1 (ref 1.1–2.2)
ALP SERPL-CCNC: 88 U/L (ref 35–104)
ALT SERPL-CCNC: 14 U/L (ref 10–35)
ANION GAP SERPL CALC-SCNC: 11 MMOL/L (ref 2–14)
APPEARANCE UR: ABNORMAL
AST SERPL-CCNC: 19 U/L (ref 10–35)
BACTERIA URNS QL MICRO: ABNORMAL /HPF
BASOPHILS # BLD: 0.07 K/UL (ref 0–0.1)
BASOPHILS NFR BLD: 0.6 % (ref 0–1)
BILIRUB SERPL-MCNC: <0.2 MG/DL (ref 0–1.2)
BILIRUB UR QL: NEGATIVE
BUN SERPL-MCNC: 17 MG/DL (ref 8–23)
BUN/CREAT SERPL: 25 (ref 12–20)
CALCIUM SERPL-MCNC: 9.1 MG/DL (ref 8.8–10.2)
CHLORIDE SERPL-SCNC: 105 MMOL/L (ref 98–107)
CO2 SERPL-SCNC: 23 MMOL/L (ref 20–29)
COLOR UR: ABNORMAL
CREAT SERPL-MCNC: 0.66 MG/DL (ref 0.6–1)
DIFFERENTIAL METHOD BLD: ABNORMAL
EOSINOPHIL # BLD: 0.4 K/UL (ref 0–0.4)
EOSINOPHIL NFR BLD: 3.3 % (ref 0–7)
EPITH CASTS URNS QL MICRO: ABNORMAL /LPF
ERYTHROCYTE [DISTWIDTH] IN BLOOD BY AUTOMATED COUNT: 13.4 % (ref 11.5–14.5)
GLOBULIN SER CALC-MCNC: 3.6 G/DL (ref 2–4)
GLUCOSE SERPL-MCNC: 143 MG/DL (ref 65–100)
GLUCOSE UR STRIP.AUTO-MCNC: NEGATIVE MG/DL
HCT VFR BLD AUTO: 42.8 % (ref 35–47)
HGB BLD-MCNC: 14.4 G/DL (ref 11.5–16)
HGB UR QL STRIP: ABNORMAL
IMM GRANULOCYTES # BLD AUTO: 0.06 K/UL (ref 0–0.04)
IMM GRANULOCYTES NFR BLD AUTO: 0.5 % (ref 0–0.5)
KETONES UR QL STRIP.AUTO: NEGATIVE MG/DL
LEUKOCYTE ESTERASE UR QL STRIP.AUTO: ABNORMAL
LIPASE SERPL-CCNC: 42 U/L (ref 13–60)
LYMPHOCYTES # BLD: 3.65 K/UL (ref 0.8–3.5)
LYMPHOCYTES NFR BLD: 30.1 % (ref 12–49)
MCH RBC QN AUTO: 30.1 PG (ref 26–34)
MCHC RBC AUTO-ENTMCNC: 33.6 G/DL (ref 30–36.5)
MCV RBC AUTO: 89.4 FL (ref 80–99)
MONOCYTES # BLD: 0.93 K/UL (ref 0–1)
MONOCYTES NFR BLD: 7.7 % (ref 5–13)
NEUTS SEG # BLD: 7.01 K/UL (ref 1.8–8)
NEUTS SEG NFR BLD: 57.8 % (ref 32–75)
NITRITE UR QL STRIP.AUTO: NEGATIVE
NRBC # BLD: 0 K/UL (ref 0–0.01)
NRBC BLD-RTO: 0 PER 100 WBC
PH UR STRIP: 8 (ref 5–8)
PLATELET # BLD AUTO: 280 K/UL (ref 150–400)
PMV BLD AUTO: 10 FL (ref 8.9–12.9)
POTASSIUM SERPL-SCNC: 3.7 MMOL/L (ref 3.5–5.1)
PROT SERPL-MCNC: 7.1 G/DL (ref 6.4–8.3)
PROT UR STRIP-MCNC: 30 MG/DL
RBC # BLD AUTO: 4.79 M/UL (ref 3.8–5.2)
RBC #/AREA URNS HPF: >100 /HPF (ref 0–5)
SODIUM SERPL-SCNC: 140 MMOL/L (ref 136–145)
SP GR UR REFRACTOMETRY: 1.01
URINE CULTURE IF INDICATED: ABNORMAL
UROBILINOGEN UR QL STRIP.AUTO: 1 EU/DL (ref 0.2–1)
WBC # BLD AUTO: 12.1 K/UL (ref 3.6–11)
WBC URNS QL MICRO: ABNORMAL /HPF (ref 0–4)

## 2025-09-02 PROCEDURE — 6360000002 HC RX W HCPCS: Performed by: EMERGENCY MEDICINE

## 2025-09-02 PROCEDURE — 96374 THER/PROPH/DIAG INJ IV PUSH: CPT

## 2025-09-02 PROCEDURE — 81001 URINALYSIS AUTO W/SCOPE: CPT

## 2025-09-02 PROCEDURE — 2580000003 HC RX 258: Performed by: EMERGENCY MEDICINE

## 2025-09-02 PROCEDURE — 85025 COMPLETE CBC W/AUTO DIFF WBC: CPT

## 2025-09-02 PROCEDURE — 99284 EMERGENCY DEPT VISIT MOD MDM: CPT

## 2025-09-02 PROCEDURE — 74176 CT ABD & PELVIS W/O CONTRAST: CPT

## 2025-09-02 PROCEDURE — 80053 COMPREHEN METABOLIC PANEL: CPT

## 2025-09-02 PROCEDURE — 96375 TX/PRO/DX INJ NEW DRUG ADDON: CPT

## 2025-09-02 PROCEDURE — 36415 COLL VENOUS BLD VENIPUNCTURE: CPT

## 2025-09-02 PROCEDURE — 83690 ASSAY OF LIPASE: CPT

## 2025-09-02 RX ORDER — IBUPROFEN 600 MG/1
600 TABLET, FILM COATED ORAL 3 TIMES DAILY PRN
Qty: 30 TABLET | Refills: 0 | Status: SHIPPED | OUTPATIENT
Start: 2025-09-02

## 2025-09-02 RX ORDER — MORPHINE SULFATE 4 MG/ML
4 INJECTION, SOLUTION INTRAMUSCULAR; INTRAVENOUS ONCE
Refills: 0 | Status: COMPLETED | OUTPATIENT
Start: 2025-09-02 | End: 2025-09-02

## 2025-09-02 RX ORDER — TAMSULOSIN HYDROCHLORIDE 0.4 MG/1
0.4 CAPSULE ORAL DAILY
Qty: 5 CAPSULE | Refills: 0 | Status: SHIPPED | OUTPATIENT
Start: 2025-09-02 | End: 2025-09-07

## 2025-09-02 RX ORDER — OXYCODONE HYDROCHLORIDE 5 MG/1
5 TABLET ORAL EVERY 6 HOURS PRN
Qty: 10 TABLET | Refills: 0 | Status: SHIPPED | OUTPATIENT
Start: 2025-09-02 | End: 2025-09-05

## 2025-09-02 RX ORDER — ONDANSETRON 2 MG/ML
4 INJECTION INTRAMUSCULAR; INTRAVENOUS
Status: COMPLETED | OUTPATIENT
Start: 2025-09-02 | End: 2025-09-02

## 2025-09-02 RX ORDER — ONDANSETRON 4 MG/1
4 TABLET, ORALLY DISINTEGRATING ORAL EVERY 8 HOURS PRN
Qty: 20 TABLET | Refills: 0 | Status: SHIPPED | OUTPATIENT
Start: 2025-09-02

## 2025-09-02 RX ORDER — 0.9 % SODIUM CHLORIDE 0.9 %
1000 INTRAVENOUS SOLUTION INTRAVENOUS ONCE
Status: COMPLETED | OUTPATIENT
Start: 2025-09-02 | End: 2025-09-02

## 2025-09-02 RX ADMIN — SODIUM CHLORIDE 1000 ML: 0.9 INJECTION, SOLUTION INTRAVENOUS at 09:20

## 2025-09-02 RX ADMIN — MORPHINE SULFATE 4 MG: 4 INJECTION, SOLUTION INTRAMUSCULAR; INTRAVENOUS at 09:19

## 2025-09-02 RX ADMIN — ONDANSETRON 4 MG: 2 INJECTION, SOLUTION INTRAMUSCULAR; INTRAVENOUS at 09:19

## 2025-09-02 ASSESSMENT — PAIN SCALES - GENERAL
PAINLEVEL_OUTOF10: 0
PAINLEVEL_OUTOF10: 0
PAINLEVEL_OUTOF10: 5

## 2025-09-02 ASSESSMENT — LIFESTYLE VARIABLES
HOW MANY STANDARD DRINKS CONTAINING ALCOHOL DO YOU HAVE ON A TYPICAL DAY: 5 OR 6
HOW OFTEN DO YOU HAVE A DRINK CONTAINING ALCOHOL: 2-3 TIMES A WEEK

## 2025-09-02 ASSESSMENT — PAIN DESCRIPTION - ORIENTATION: ORIENTATION: LEFT

## 2025-09-02 ASSESSMENT — PAIN - FUNCTIONAL ASSESSMENT: PAIN_FUNCTIONAL_ASSESSMENT: ACTIVITIES ARE NOT PREVENTED

## 2025-09-02 ASSESSMENT — PAIN DESCRIPTION - LOCATION: LOCATION: BACK

## 2025-09-02 ASSESSMENT — PAIN DESCRIPTION - PAIN TYPE: TYPE: ACUTE PAIN

## 2025-09-02 ASSESSMENT — PAIN DESCRIPTION - FREQUENCY: FREQUENCY: CONTINUOUS

## 2025-09-02 ASSESSMENT — PAIN DESCRIPTION - DESCRIPTORS: DESCRIPTORS: BURNING

## (undated) DEVICE — CATHETER IV 22GA L1IN OD0.8382-0.9144MM ID0.6096-0.6858MM 382523

## (undated) DEVICE — COVIDIEN KENDALL DL DISPOSABLE 3 LEAD SY: Brand: MEDLINE RENEWAL

## (undated) DEVICE — CONTAINER SPEC 20 ML LID NEUT BUFF FORMALIN 10 % POLYPR STS

## (undated) DEVICE — CATHETER IV 20GA L1.16IN OD1.0414-1.1176MM ID0.762-0.8382MM

## (undated) DEVICE — SET GRAV CK VLV NEEDLESS ST 3 GANGED 4WAY STPCOCK HI FLO 10

## (undated) DEVICE — CUFF BLD PRSS AD CLTH SGL TB W/ BAYNT CONN ROUNDED CORNER

## (undated) DEVICE — IV START KIT: Brand: MEDLINE

## (undated) DEVICE — ENDOSCOPIC KIT COMPLIANCE ENDOKIT

## (undated) DEVICE — BITEBLOCK 54FR W/ DENT RIM BLOX

## (undated) DEVICE — FORCEPS BX PED L160CM JAW DIA1.8MM WRK CHN 2MM W/ NDL DISP